# Patient Record
Sex: FEMALE | Race: WHITE | NOT HISPANIC OR LATINO | Employment: UNEMPLOYED | URBAN - METROPOLITAN AREA
[De-identification: names, ages, dates, MRNs, and addresses within clinical notes are randomized per-mention and may not be internally consistent; named-entity substitution may affect disease eponyms.]

---

## 2017-11-02 ENCOUNTER — TELEPHONE (OUTPATIENT)
Dept: NEUROLOGY | Facility: CLINIC | Age: 57
End: 2017-11-02

## 2017-11-02 NOTE — TELEPHONE ENCOUNTER
Call patients doctor office to advise them I was unable to contact patient to schedule an appointment. Office will have the patient to contact me to schedule an appointment. Patient will be scheduled with Dr. Hyde on 2nd or 4th Tuesday of the month.

## 2017-12-20 ENCOUNTER — OFFICE VISIT (OUTPATIENT)
Dept: NEUROLOGY | Facility: CLINIC | Age: 57
End: 2017-12-20
Payer: COMMERCIAL

## 2017-12-20 VITALS
DIASTOLIC BLOOD PRESSURE: 78 MMHG | WEIGHT: 135 LBS | HEIGHT: 68 IN | HEART RATE: 93 BPM | BODY MASS INDEX: 20.46 KG/M2 | SYSTOLIC BLOOD PRESSURE: 117 MMHG

## 2017-12-20 DIAGNOSIS — G61.81 CIDP (CHRONIC INFLAMMATORY DEMYELINATING POLYNEUROPATHY): Primary | ICD-10-CM

## 2017-12-20 PROCEDURE — 99205 OFFICE O/P NEW HI 60 MIN: CPT | Mod: S$GLB,,, | Performed by: PSYCHIATRY & NEUROLOGY

## 2017-12-20 PROCEDURE — 99999 PR PBB SHADOW E&M-EST. PATIENT-LVL III: CPT | Mod: PBBFAC,,, | Performed by: PSYCHIATRY & NEUROLOGY

## 2017-12-20 RX ORDER — NITROFURANTOIN MACROCRYSTALS 50 MG/1
CAPSULE ORAL
Refills: 4 | COMMUNITY
Start: 2017-12-01

## 2017-12-20 RX ORDER — PANTOPRAZOLE SODIUM 20 MG/1
20 TABLET, DELAYED RELEASE ORAL DAILY
COMMUNITY

## 2017-12-20 RX ORDER — TEMAZEPAM 22.5 MG/1
22.5 CAPSULE ORAL NIGHTLY PRN
COMMUNITY

## 2017-12-20 RX ORDER — HYDROXYCHLOROQUINE SULFATE 200 MG/1
TABLET, FILM COATED ORAL
Refills: 6 | COMMUNITY
Start: 2017-11-27

## 2017-12-20 RX ORDER — HYOSCYAMINE SULFATE 0.38 MG/1
TABLET, EXTENDED RELEASE ORAL
Refills: 6 | COMMUNITY
Start: 2017-11-26

## 2017-12-20 RX ORDER — ALPRAZOLAM 0.25 MG/1
TABLET ORAL
Refills: 3 | COMMUNITY
Start: 2017-12-13

## 2017-12-20 RX ORDER — CALCIUM CARBONATE 500(1250)
1 TABLET ORAL DAILY
COMMUNITY

## 2017-12-20 NOTE — PROGRESS NOTES
Patient Name: Taisha Nance  MRN: [id number]    CC: 2nd opinion for TM vs AIDP    HPI: Taisha Nance is a 57 y.o. female with no significant PMHx presents for second opinion of TM vs AIDP. Patient states that   GI viral illness in January 12th- diarrhea and vomiting  January 12th- hospital because she felt like she was dehydrated  January 13th- went to PCP- knees buckled and BLE weakness, could not walk. Before all this for a ~ 1 month, fingertips tingling. No other s/sxs.   January 13th- sodium levels severely low.   Patient had bilateral lower extremity paralysis abruptly with loss of control of bladder and bowel.   No respiratory issues, no ventilator. Had LP and MRIs. Records will be sent to us. Unknown what results were of CSF studies.  Patient was seen in hospital by neurologist- Dr. 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   Patient also had positive REJI and was diagnosed with lupus by rheumatologist who thinks this is lupus TM as per patient. She has since been started on cytoxan, plaquenil, steroids (weaned off), rituxan x 2 (initial doses).   Patient had IVIG and PLEX during hospital stay.   Patient had NCS in hospital- does not know results.   Patient also had CSF studies- need results- does not know (states there might have been some inflammation in results)  Denies any history of optic neuritis.     MRI Brain in January- showed changes in periaqueducatal grey, pontine tegmentum, superior cerebellar peduncles, lower midbrain tectal plate with symmetry favoring a metabolic process (osmotic demyelination vs nutritional deficiency) as per MRI report.     MRI Brain in May- Above changes have resolved.     MRI spine- extensive abnormal signal in med and lower thoracic spinal cord. Repeat MRI months later shows improvement. Full records to be provided in next visit with images.         ROS:   Review of Systems   Constitutional: Negative for malaise/fatigue. Negative for weight loss.    HENT: Negative for hearing loss.   Eyes: Negative for blurred vision and double vision.   Respiratory: Negative for shortness of breath and stridor.   Cardiovascular: Negative for chest pain and palpitations.   Gastrointestinal: Negative for nausea, vomiting and constipation.   Genitourinary: Negative for frequency. Negative for urgency.   Musculoskeletal: Negative for joint pain. Negative for myalgias and falls.   Skin: Negative for rash.   Neurological: Negative for dizziness and tremors. Negative for focal weakness and seizures.   Endo/Heme/Allergies: Does not bruise/bleed easily.   Psychiatric/Behavioral: Negative for memory loss. Negative for depression and hallucinations. The patient is not nervous/anxious.      Past Medical History  No past medical history on file.    Medications    Current Outpatient Prescriptions:     ALPRAZolam (XANAX) 0.25 MG tablet, TK 1 T PO TID PRA, Disp: , Rfl: 3    calcium carbonate (OS-SPENCER) 500 mg calcium (1,250 mg) tablet, Take 1 tablet by mouth once daily., Disp: , Rfl:     hydroxychloroquine (PLAQUENIL) 200 mg tablet, TK ONE T PO D WF OR MILK, Disp: , Rfl: 6    hyoscyamine (LEVBID) 0.375 mg Tb12, TK 2 TS PO Q 12 H PRF SPASM, Disp: , Rfl: 6    LACTOBAC NO.41/BIFIDOBACT NO.7 (PROBIOTIC-10 ORAL), Take by mouth., Disp: , Rfl:     multivitamin capsule, Take 1 capsule by mouth once daily., Disp: , Rfl:     nitrofurantoin (MACRODANTIN) 50 MG capsule, TK ONE C PO QD STARTING ONCE OTHER SCRIPT IS COMPLETE, Disp: , Rfl: 4    pantoprazole (PROTONIX) 20 MG tablet, Take 20 mg by mouth once daily., Disp: , Rfl:     temazepam (RESTORIL) 22.5 MG capsule, Take 22.5 mg by mouth nightly as needed for Insomnia., Disp: , Rfl:     alprazolam (XANAX) 1 MG tablet, Take 1 tablet (1 mg total) by mouth 2 (two) times daily. (Patient taking differently: Take 0.25 mg by mouth 2 (two) times daily. ), Disp: 60 tablet, Rfl: 1    amlodipine (NORVASC) 5 MG tablet, Take 5 mg by mouth once daily., Disp:  ", Rfl:     fluconazole (DIFLUCAN) 100 MG tablet, Take 100 mg by mouth once daily., Disp: , Rfl:     fluticasone (FLONASE) 50 mcg/actuation nasal spray, , Disp: , Rfl: 4    hyoscyamine (LEVSIN/SL) 0.125 mg Subl, , Disp: , Rfl: 0    venlafaxine (EFFEXOR-XR) 37.5 MG 24 hr capsule, , Disp: , Rfl: 6    Allergies  Review of patient's allergies indicates:   Allergen Reactions    Bactrim [sulfamethoxazole-trimethoprim]      Causes a rash       Social History  Social History     Social History    Marital status:      Spouse name: N/A    Number of children: N/A    Years of education: N/A     Occupational History    Not on file.     Social History Main Topics    Smoking status: Never Smoker    Smokeless tobacco: Not on file    Alcohol use Not on file    Drug use: Unknown    Sexual activity: Not on file     Other Topics Concern    Not on file     Social History Narrative    No narrative on file       Family History  No family history on file.    Physical Exam  /78   Pulse 93   Ht 5' 7.5" (1.715 m)   Wt 61.2 kg (135 lb)   BMI 20.83 kg/m²     General appearance: Well-developed, well-groomed in wheelchair in good spirits     Neurologic Exam: The patient is awake, alert and oriented. Language is fluent. Recent and remote memory are normal. Attention span and concentration are normal. Fund of knowledge is appropriate.     Cranial nerves: pupils are round and reactive to light and accommodation. Visual fields are full to confrontation. Ocular motility is full in all cardinal positions of gaze. Facial sensation is normal to pinprick and light touch. Corneal reflexes are present bilaterally. Facial activation is symmetric. Hearing is normal bilaterally. Palate elevates symmetrically and gag reflex is intact bilaterally. Shoulder elevation is symmetric and full strength bilaterally. Tongue is midline and neck rotation strength is normal bilaterally. Neck range of motion is normal.     Motor examination " of all extremities demonstrates normal bulk and tone in all four limbs. There are no atrophy or fasciculations. Strength is 5/5 in the upper and lower extremities bilaterally without pronator drift.     Sensory examination no sensation to vibration or pinpoint below waist. Sensory level T12 anterior, L1 posteriorly. There is def more feeling posteriorly the higher up I go to pinprick.     Deep tendon reflexes no reflexes in BLE. Brisk in BUE    Gait: plegic from waist down    Coordination: Finger to nose  in  upperextremities. Rapid alternating movements are normal in  upper ext    General exam  Cardiovascular: regular rate and rhythm with no murmurs, rubs or gallops. There are no carotid or vertebral artery bruits. Pulses in both upper and lower extremities are symmetric. There is no peripheral edema.   Head and neck: no cervical lymphadenopathy      Lab and Test Results    No results found for: WBC, HGB, HCT, PLT  No results found for: GLU, NA, K, CL, CO2, BUN, CREATININE, CALCIUM, MG, PHOS  No results found for: ALB, ALKPHOS, ALT, AST      Images: Will provide records    Independently reviewed NO    Other Tests    Assessment and Plan    Taisha Nance is a 57 y.o. female with significant disability since abnormal spinal cord lesion (resolving as per patient and repeat MRIs) and post infectious neurological issues. TM vs AIDP/GBS. Will require record with images to find out etiology. OSH neurologist convinced this was GSB while rheumatologist thinks is SLE TM. Will need to see CSF studies and images along with other labs. Patient wants 2nd opinion. She is continuing therapy. Therapies tried or is trying: cytoxan, rituxan, steroids, plaquenil. PLEX and IVIG. Has not had a repeat EMG/NCS.     Need records from outside hospital with imaging CDs  Will see patient back in specialty clinic.   Clinic in February 14th  Consider EMG/NCS next visit for prognostication?       Elyssa Hyde MD  Neurology  Resident   [facility]  [address] [place] [address]  Pager: [phone]

## 2017-12-27 NOTE — PROGRESS NOTES
Attestation:  I have personally taken the history and examined this patient and concur with the resident's note as stated above.   Assessment, and plan was made by me after evaluation of all available information .    Pamela Baer MD, JASWANT(), [facility].  Neurology-Epilepsy.

## 2018-01-19 ENCOUNTER — TELEPHONE (OUTPATIENT)
Dept: NEUROLOGY | Facility: CLINIC | Age: 58
End: 2018-01-19

## 2018-01-19 NOTE — TELEPHONE ENCOUNTER
----- Message from Wanda Chilo sent at 1/19/2018  1:08 PM CST -----  Contact: Self- [phone]  Mary Ellen- pt called to see if her medical records have been received yet- please call pt back at [phone]

## 2018-01-23 ENCOUNTER — OFFICE VISIT (OUTPATIENT)
Dept: NEUROLOGY | Facility: CLINIC | Age: 58
End: 2018-01-23
Payer: COMMERCIAL

## 2018-01-23 VITALS
SYSTOLIC BLOOD PRESSURE: 116 MMHG | WEIGHT: 135 LBS | BODY MASS INDEX: 20.46 KG/M2 | DIASTOLIC BLOOD PRESSURE: 70 MMHG | HEART RATE: 84 BPM | HEIGHT: 68 IN

## 2018-01-23 DIAGNOSIS — G37.3 TRANSVERSE MYELITIS: Primary | ICD-10-CM

## 2018-01-23 PROCEDURE — 99999 PR PBB SHADOW E&M-EST. PATIENT-LVL III: CPT | Mod: PBBFAC,,, | Performed by: PSYCHIATRY & NEUROLOGY

## 2018-01-23 PROCEDURE — 99215 OFFICE O/P EST HI 40 MIN: CPT | Mod: S$GLB,,, | Performed by: PSYCHIATRY & NEUROLOGY

## 2018-01-23 NOTE — PROGRESS NOTES
Patient Name: Taisha Nance  MRN: [id number]    CC: 2nd opinion for TM vs AIDP    Interval Hx- Patient continues to be bound to wheelchair with similar neurological status. Present with imaging CDs. Will have rituxan infusions as per rheumatologist.     HPI: Taisha Nance is a 57 y.o. female with no significant PMHx presents for second opinion of TM vs AIDP. Patient states that   GI viral illness in January 12th- diarrhea and vomiting  January 12th- hospital because she felt like she was dehydrated  January 13th- went to PCP- knees buckled and BLE weakness, could not walk. Before all this for a ~ 1 month, fingertips tingling. No other s/sxs.   January 13th- sodium levels severely low.   Patient had bilateral lower extremity paralysis abruptly with loss of control of bladder and bowel.   No respiratory issues, no ventilator. Had LP and MRIs. Records will be sent to us. Unknown what results were of CSF studies.  Patient was seen in hospital by neurologist- Dr. 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   Patient also had positive REJI and was diagnosed with lupus by rheumatologist who thinks this is lupus TM as per patient. She has since been started on cytoxan, plaquenil, steroids (weaned off), rituxan x 2 (initial doses).   Patient had IVIG and PLEX during hospital stay.   Patient had NCS in hospital- does not know results.   Patient also had CSF studies- need results- does not know (states there might have been some inflammation in results)  Denies any history of optic neuritis.     MRI Brain in January- showed changes in periaqueducatal grey, pontine tegmentum, superior cerebellar peduncles, lower midbrain tectal plate with symmetry favoring a metabolic process (osmotic demyelination vs nutritional deficiency) as per MRI report.     MRI Brain in May- Above changes have resolved.     MRI spine- extensive abnormal signal in med and lower thoracic spinal cord. Repeat MRI months later shows  improvement. Full records to be provided in next visit with images.         ROS:   Review of Systems   Constitutional: Negative for malaise/fatigue. Negative for weight loss.   HENT: Negative for hearing loss.   Eyes: Negative for blurred vision and double vision.   Respiratory: Negative for shortness of breath and stridor.   Cardiovascular: Negative for chest pain and palpitations.   Gastrointestinal: Negative for nausea, vomiting and constipation.   Genitourinary: Negative for frequency. Negative for urgency.   Musculoskeletal: Negative for joint pain. Negative for myalgias and falls.   Skin: Negative for rash.   Neurological: Negative for dizziness and tremors. Negative for focal weakness and seizures.   Endo/Heme/Allergies: Does not bruise/bleed easily.   Psychiatric/Behavioral: Negative for memory loss. Negative for depression and hallucinations. The patient is not nervous/anxious.      Past Medical History  No past medical history on file.    Medications    Current Outpatient Prescriptions:     ALPRAZolam (XANAX) 0.25 MG tablet, TK 1 T PO TID PRA, Disp: , Rfl: 3    alprazolam (XANAX) 1 MG tablet, Take 1 tablet (1 mg total) by mouth 2 (two) times daily. (Patient taking differently: Take 0.25 mg by mouth 2 (two) times daily. ), Disp: 60 tablet, Rfl: 1    amlodipine (NORVASC) 5 MG tablet, Take 5 mg by mouth once daily., Disp: , Rfl:     calcium carbonate (OS-SPENCER) 500 mg calcium (1,250 mg) tablet, Take 1 tablet by mouth once daily., Disp: , Rfl:     fluconazole (DIFLUCAN) 100 MG tablet, Take 100 mg by mouth once daily., Disp: , Rfl:     fluticasone (FLONASE) 50 mcg/actuation nasal spray, , Disp: , Rfl: 4    hydroxychloroquine (PLAQUENIL) 200 mg tablet, TK ONE T PO D WF OR MILK, Disp: , Rfl: 6    hyoscyamine (LEVBID) 0.375 mg Tb12, TK 2 TS PO Q 12 H PRF SPASM, Disp: , Rfl: 6    hyoscyamine (LEVSIN/SL) 0.125 mg Subl, , Disp: , Rfl: 0    LACTOBAC NO.41/BIFIDOBACT NO.7 (PROBIOTIC-10 ORAL), Take by mouth.,  "Disp: , Rfl:     multivitamin capsule, Take 1 capsule by mouth once daily., Disp: , Rfl:     nitrofurantoin (MACRODANTIN) 50 MG capsule, TK ONE C PO QD STARTING ONCE OTHER SCRIPT IS COMPLETE, Disp: , Rfl: 4    pantoprazole (PROTONIX) 20 MG tablet, Take 20 mg by mouth once daily., Disp: , Rfl:     temazepam (RESTORIL) 22.5 MG capsule, Take 22.5 mg by mouth nightly as needed for Insomnia., Disp: , Rfl:     venlafaxine (EFFEXOR-XR) 37.5 MG 24 hr capsule, , Disp: , Rfl: 6    Allergies  Review of patient's allergies indicates:   Allergen Reactions    Bactrim [sulfamethoxazole-trimethoprim]      Causes a rash       Social History  Social History     Social History    Marital status:      Spouse name: N/A    Number of children: N/A    Years of education: N/A     Occupational History    Not on file.     Social History Main Topics    Smoking status: Never Smoker    Smokeless tobacco: Not on file    Alcohol use Not on file    Drug use: Unknown    Sexual activity: Not on file     Other Topics Concern    Not on file     Social History Narrative    No narrative on file       Family History  No family history on file.    Physical Exam  /70   Pulse 84   Ht 5' 7.5" (1.715 m)   Wt 61.2 kg (135 lb)   BMI 20.83 kg/m²     General appearance: Well-developed, well-groomed in wheelchair in good spirits     Neurologic Exam: The patient is awake, alert and oriented. Language is fluent. Recent and remote memory are normal. Attention span and concentration are normal. Fund of knowledge is appropriate.     Cranial nerves: pupils are round and reactive to light and accommodation. Visual fields are full to confrontation. Ocular motility is full in all cardinal positions of gaze. Facial sensation is normal to pinprick and light touch. Corneal reflexes are present bilaterally. Facial activation is symmetric. Hearing is normal bilaterally. Palate elevates symmetrically and gag reflex is intact bilaterally. " Shoulder elevation is symmetric and full strength bilaterally. Tongue is midline and neck rotation strength is normal bilaterally. Neck range of motion is normal.     Motor examination of all extremities demonstrates normal bulk and tone in all four limbs. There are no atrophy or fasciculations. Strength is 5/5 in the upper and lower extremities bilaterally without pronator drift.     Sensory examination no sensation to vibration or pinpoint below waist. Sensory level T12 anterior, L1 posteriorly. There is def more feeling posteriorly the higher up I go to pinprick.     Deep tendon reflexes no reflexes in BLE. Brisk in BUE    Gait: plegic from waist down    Coordination: Finger to nose  in  upperextremities. Rapid alternating movements are normal in  upper ext    General exam  Cardiovascular: regular rate and rhythm with no murmurs, rubs or gallops. There are no carotid or vertebral artery bruits. Pulses in both upper and lower extremities are symmetric. There is no peripheral edema.   Head and neck: no cervical lymphadenopathy      Lab and Test Results    No results found for: WBC, HGB, HCT, PLT  No results found for: GLU, NA, K, CL, CO2, BUN, CREATININE, CALCIUM, MG, PHOS  No results found for: ALB, ALKPHOS, ALT, AST      Images: Will provide records    Independently reviewed NO    Other Tests    Assessment and Plan    Taisha Nance is a 57 y.o. female with significant disability since abnormal spinal cord lesion (resolving as per patient and repeat MRIs) and post infectious neurological issues. TM vs AIDP/GBS. Will require record with images to find out etiology. OSH neurologist convinced this was GSB while rheumatologist thinks is SLE TM. Will need to see CSF studies and images along with other labs. Patient wants 2nd opinion. She is continuing therapy. Therapies tried or is trying: cytoxan, rituxan, steroids, plaquenil. PLEX and IVIG. Has not had a repeat EMG/NCS but feel this will not be of high yield as  story sounding less like AIDP/CIDP when going through the records. Patient states that she had a negative NMO, but I cannot find within records. Will go through imaging with Dr. Xavier to get a better idea of autoimmune TM vs NMO vs less likely CIDP.       Will see patient back On Feb 14th with Dr. Xavier  Clinic in February 14th Feb 14th    Elyssa Hyde MD  Neurology Resident   [facility] [address] [place] [address]  Pager: [phone]

## 2018-02-05 ENCOUNTER — TELEPHONE (OUTPATIENT)
Dept: NEUROLOGY | Facility: CLINIC | Age: 58
End: 2018-02-05

## 2018-02-05 NOTE — TELEPHONE ENCOUNTER
----- Message from Gabrielle Carrera sent at 2/2/2018  8:46 AM CST -----  Contact: Gunnar (Dr. Robbins's Office) [phone]  Jason called to speak to someone regarding the patient's clinic notes. The release of information was sent 01/23/18. Please contact him to discuss further.

## 2018-02-15 ENCOUNTER — TELEPHONE (OUTPATIENT)
Dept: NEUROLOGY | Facility: CLINIC | Age: 58
End: 2018-02-15

## 2018-02-15 NOTE — TELEPHONE ENCOUNTER
----- Message from Sindy Félix sent at 2/14/2018  7:21 AM CST -----  Contact: [phone]  Pt called to speak to the nurse regarding her care and to find out about an appt she was supposed to have scheduled with the provider today. Pt stated that she was told to come to an appt today and that she would also be seen by a specialist who would also see her today. Pt would like a call back this morning asap.     Pt can be reached at  [phone].    Thanks

## 2018-02-15 NOTE — TELEPHONE ENCOUNTER
----- Message from Tano Berkowitz sent at 2/14/2018  8:08 AM CST -----  Contact: Patient @ [phone]  Patient is requesting a return call about an appt today with , pls call

## 2018-05-01 ENCOUNTER — TELEPHONE (OUTPATIENT)
Dept: NEUROLOGY | Facility: CLINIC | Age: 58
End: 2018-05-01

## 2018-05-01 NOTE — TELEPHONE ENCOUNTER
----- Message from Veda Keller sent at 4/30/2018  4:07 PM CDT -----  Test Results    Who Called:Taisha  Name of Test (Lab/Mammo/Etc):Nerve Conduction test  Date of Test:02/07/18  Ordering Provider:Dr. Bell  Where the test was performed:[facility] ([email or web address])  Communication Preference (MyChart response to Pt. (or) Call Back # and timeframe):[phone] or  [phone] or [phone]  Additional Information:asking if she needs the EMG, and asking if Dr. Xavier is going to be at her visit for a consult.

## 2018-05-24 ENCOUNTER — TELEPHONE (OUTPATIENT)
Dept: NEUROLOGY | Facility: CLINIC | Age: 58
End: 2018-05-24

## 2018-05-24 NOTE — TELEPHONE ENCOUNTER
----- Message from Hilario MARKIE Hwang sent at 5/21/2018  4:47 PM CDT -----  Needs Medical Advice    Who Called: Pt  Communication Preference (Louist response to Pt. (or) Call Back # and timeframe): [phone], [phone], or   Additional Information: Pt is calling to confirm if Dr. Xavier will be w/ Dr. Hyde for appt on 05/30. She's also asking if EMG results from [facility] have been received (pt states if it's incomplete, can she take the other half of the EMG on 05/30?)

## 2018-05-30 ENCOUNTER — OFFICE VISIT (OUTPATIENT)
Dept: NEUROLOGY | Facility: CLINIC | Age: 58
End: 2018-05-30
Payer: COMMERCIAL

## 2018-05-30 ENCOUNTER — LAB VISIT (OUTPATIENT)
Dept: LAB | Facility: HOSPITAL | Age: 58
End: 2018-05-30
Attending: PSYCHIATRY & NEUROLOGY
Payer: COMMERCIAL

## 2018-05-30 VITALS — DIASTOLIC BLOOD PRESSURE: 71 MMHG | HEIGHT: 68 IN | SYSTOLIC BLOOD PRESSURE: 113 MMHG

## 2018-05-30 DIAGNOSIS — G37.3 TRANSVERSE MYELITIS: Primary | ICD-10-CM

## 2018-05-30 DIAGNOSIS — G37.3 TRANSVERSE MYELITIS: ICD-10-CM

## 2018-05-30 PROCEDURE — 99215 OFFICE O/P EST HI 40 MIN: CPT | Mod: S$GLB,,, | Performed by: PSYCHIATRY & NEUROLOGY

## 2018-05-30 PROCEDURE — 86255 FLUORESCENT ANTIBODY SCREEN: CPT | Mod: 59

## 2018-05-30 PROCEDURE — 36415 COLL VENOUS BLD VENIPUNCTURE: CPT

## 2018-05-30 PROCEDURE — 86255 FLUORESCENT ANTIBODY SCREEN: CPT

## 2018-05-30 PROCEDURE — 99999 PR PBB SHADOW E&M-EST. PATIENT-LVL III: CPT | Mod: PBBFAC,,, | Performed by: PSYCHIATRY & NEUROLOGY

## 2018-06-04 NOTE — PROGRESS NOTES
Patient Name: Taisha Nance  MRN: [id number]    CC: 2nd opinion for TM vs AIDP    Interval Hx- Patient continues to be wheelchair bound with similar neurological status. Has had 3 rituxan infusions.     Interval Hx- Patient continues to be bound to wheelchair with similar neurological status. Present with imaging CDs. Will have rituxan infusions as per rheumatologist.     HPI: Taisha Nance is a 58 y.o. female with no significant PMHx presents for second opinion of TM vs AIDP. Patient states that   GI viral illness in January 12th- diarrhea and vomiting  January 12th- hospital because she felt like she was dehydrated  January 13th- went to PCP- knees buckled and BLE weakness, could not walk. Before all this for a ~ 1 month, fingertips tingling. No other s/sxs.   January 13th- sodium levels severely low.   Patient had bilateral lower extremity paralysis abruptly with loss of control of bladder and bowel.   No respiratory issues, no ventilator. Had LP and MRIs. Records will be sent to us. Unknown what results were of CSF studies.  Patient was seen in hospital by neurologist- Dr. 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   Patient also had positive REJI and was diagnosed with lupus by rheumatologist who thinks this is lupus TM as per patient. She has since been started on cytoxan, plaquenil, steroids (weaned off), rituxan x 2 (initial doses).   Patient had IVIG and PLEX during hospital stay.   Patient had NCS in hospital- does not know results.   Patient also had CSF studies- need results- does not know (states there might have been some inflammation in results)  Denies any history of optic neuritis.     MRI Brain in January- showed changes in periaqueducatal grey, pontine tegmentum, superior cerebellar peduncles, lower midbrain tectal plate with symmetry favoring a metabolic process (osmotic demyelination vs nutritional deficiency) as per MRI report.     MRI Brain in May- Above changes have  resolved.     MRI spine- extensive abnormal signal in med and lower thoracic spinal cord. Repeat MRI months later shows improvement. Full records to be provided in next visit with images.         ROS:   Review of Systems   Constitutional: Negative for malaise/fatigue. Negative for weight loss.   HENT: Negative for hearing loss.   Eyes: Negative for blurred vision and double vision.   Respiratory: Negative for shortness of breath and stridor.   Cardiovascular: Negative for chest pain and palpitations.   Gastrointestinal: Negative for nausea, vomiting and constipation.   Genitourinary: Negative for frequency. Negative for urgency.   Musculoskeletal: Negative for joint pain. Negative for myalgias and falls.   Skin: Negative for rash.   Neurological: Negative for dizziness and tremors. Negative for focal weakness and seizures.   Endo/Heme/Allergies: Does not bruise/bleed easily.   Psychiatric/Behavioral: Negative for memory loss. Negative for depression and hallucinations. The patient is not nervous/anxious.      Past Medical History  No past medical history on file.    Medications    Current Outpatient Prescriptions:     ALPRAZolam (XANAX) 0.25 MG tablet, TK 1 T PO TID PRA, Disp: , Rfl: 3    alprazolam (XANAX) 1 MG tablet, Take 1 tablet (1 mg total) by mouth 2 (two) times daily. (Patient taking differently: Take 0.25 mg by mouth 2 (two) times daily. ), Disp: 60 tablet, Rfl: 1    amlodipine (NORVASC) 5 MG tablet, Take 5 mg by mouth once daily., Disp: , Rfl:     calcium carbonate (OS-SPENCER) 500 mg calcium (1,250 mg) tablet, Take 1 tablet by mouth once daily., Disp: , Rfl:     fluticasone (FLONASE) 50 mcg/actuation nasal spray, , Disp: , Rfl: 4    hydroxychloroquine (PLAQUENIL) 200 mg tablet, TK ONE T PO D WF OR MILK, Disp: , Rfl: 6    hyoscyamine (LEVBID) 0.375 mg Tb12, TK 2 TS PO Q 12 H PRF SPASM, Disp: , Rfl: 6    hyoscyamine (LEVSIN/SL) 0.125 mg Subl, , Disp: , Rfl: 0    LACTOBAC NO.41/BIFIDOBACT NO.7  "(PROBIOTIC-10 ORAL), Take by mouth., Disp: , Rfl:     multivitamin capsule, Take 1 capsule by mouth once daily., Disp: , Rfl:     nitrofurantoin (MACRODANTIN) 50 MG capsule, TK ONE C PO QD STARTING ONCE OTHER SCRIPT IS COMPLETE, Disp: , Rfl: 4    fluconazole (DIFLUCAN) 100 MG tablet, Take 100 mg by mouth once daily., Disp: , Rfl:     pantoprazole (PROTONIX) 20 MG tablet, Take 20 mg by mouth once daily., Disp: , Rfl:     temazepam (RESTORIL) 22.5 MG capsule, Take 22.5 mg by mouth nightly as needed for Insomnia., Disp: , Rfl:     venlafaxine (EFFEXOR-XR) 37.5 MG 24 hr capsule, , Disp: , Rfl: 6    Allergies  Review of patient's allergies indicates:   Allergen Reactions    Bactrim [sulfamethoxazole-trimethoprim]      Causes a rash       Social History  Social History     Social History    Marital status:      Spouse name: N/A    Number of children: N/A    Years of education: N/A     Occupational History    Not on file.     Social History Main Topics    Smoking status: Never Smoker    Smokeless tobacco: Not on file    Alcohol use Not on file    Drug use: Unknown    Sexual activity: Not on file     Other Topics Concern    Not on file     Social History Narrative    No narrative on file       Family History  No family history on file.    Physical Exam  /71   Ht 5' 7.5" (1.715 m)     General appearance: Well-developed, well-groomed in wheelchair in good spirits     Neurologic Exam: The patient is awake, alert and oriented. Language is fluent. Recent and remote memory are normal. Attention span and concentration are normal. Fund of knowledge is appropriate.     Cranial nerves: pupils are round and reactive to light and accommodation. Visual fields are full to confrontation. Ocular motility is full in all cardinal positions of gaze. Facial sensation is normal to pinprick and light touch. Corneal reflexes are present bilaterally. Facial activation is symmetric. Hearing is normal bilaterally. " Palate elevates symmetrically and gag reflex is intact bilaterally. Shoulder elevation is symmetric and full strength bilaterally. Tongue is midline and neck rotation strength is normal bilaterally. Neck range of motion is normal.     Motor examination of all extremities demonstrates normal bulk and tone in all four limbs. There are no atrophy or fasciculations. Strength is 5/5 in the upper and lower extremities bilaterally without pronator drift.     Sensory examination no sensation to vibration or pinpoint below waist. Sensory level T12 anterior, L1 posteriorly. There is def more feeling posteriorly the higher up I go to pinprick.     Deep tendon reflexes no reflexes in BLE. Brisk in BUE    Gait: plegic from waist down    Coordination: Finger to nose  in  upperextremities. Rapid alternating movements are normal in  upper ext    General exam  Cardiovascular: regular rate and rhythm with no murmurs, rubs or gallops. There are no carotid or vertebral artery bruits. Pulses in both upper and lower extremities are symmetric. There is no peripheral edema.   Head and neck: no cervical lymphadenopathy      Lab and Test Results    No results found for: WBC, HGB, HCT, PLT  No results found for: GLU, NA, K, CL, CO2, BUN, CREATININE, CALCIUM, MG, PHOS  No results found for: ALB, ALKPHOS, ALT, AST      Images: MRI brain C/T/L spine uploaded    Independently reviewed YES    Other Tests    Assessment and Plan    Taisha Nance is a 58 y.o. female with significant disability since abnormal spinal cord lesion (resolving as per patient and repeat MRIs) and post infectious neurological issues. TM vs AIDP/GBS vs NMO. 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 Patient currently on Rituxan as well. Will obtain further labs (NMO and anti-MOG). NMO titers can be affects by rituxan therapy. We would like  the patient to be followed in the [facility] by Dr. Xavier's team. Patient will decide on this and let us know where she would like to follow for neurology.       Elyssa Hyde MD  Neurology Resident   [facility]  [address] [place] [address]  Pager: [phone]

## 2018-06-06 LAB
NMO/AQP4 FACS,S: NEGATIVE
NMO/AQP4 FACS,S: NEGATIVE

## 2018-06-13 ENCOUNTER — DOCUMENTATION ONLY (OUTPATIENT)
Dept: NEUROLOGY | Facility: CLINIC | Age: 58
End: 2018-06-13

## 2018-06-13 NOTE — PROGRESS NOTES
Received MRI of the T-Spine, completed on 6/4/18, from [facility]. Placed in Dr. Xavier's folder for review.

## 2018-06-30 NOTE — PROGRESS NOTES
I have personally seen and examined the patient along with the neurology resident, and agree with assessment and recommendations.    Jayashree Xavier MD

## 2019-06-26 ENCOUNTER — TELEPHONE (OUTPATIENT)
Dept: NEUROLOGY | Facility: CLINIC | Age: 59
End: 2019-06-26

## 2019-06-26 NOTE — TELEPHONE ENCOUNTER
I spoke to this patient that seen Dr. Hyde in 5/2018. She wants to know if she can see a Neurologist here to write her a referral to go to [facility] to do a specialized training to use a machine that will help her walk? I advised her that it may be better if she scheduled so see a Neurologist at [facility] to get this referral. She would like to know if any of our Neurologist can do this for her?

## 2019-06-26 NOTE — TELEPHONE ENCOUNTER
----- Message from Corrie De La Cruz sent at 6/26/2019 10:19 AM CDT -----  Contact: PT  PT is calling requesting to speak with nurse  Reason: Robotic ReWalk - needs help to train for this, needs a referral.       Callback #1: [phone] (don't leave a message on this number)  Callback #2: [phone]

## 2023-07-05 ENCOUNTER — TELEPHONE (OUTPATIENT)
Dept: REHABILITATION | Facility: HOSPITAL | Age: 63
End: 2023-07-05
Payer: COMMERCIAL

## 2023-07-05 NOTE — TELEPHONE ENCOUNTER
Followed up with Taisha regarding the voicemail she left at the driving eval office.     Taisha mentioned that a referral for a driving evaluation was faxed to us in May; chart review showed no posted referral, which would explain the lack of follow up on our end to schedule this eval.     I gave her the fax number for the referral, as well as phone numbers to schedule.     We discussed available scheduling slots; she mentioned that she will need increased time for transfers due to being a wheelchair user, and is in understanding that she might need additional session, either with us or a driving school, to become efficient in using hand controls to drive.    She mentions that she completed the in-office testing at [facility], about a month or 2 months ago.     HANY Mendez, OTR/L, CEAS-I  7/5/2023

## 2023-07-10 DIAGNOSIS — Z91.89 DRIVING SAFETY ISSUE: Primary | ICD-10-CM

## 2023-07-25 ENCOUNTER — CLINICAL SUPPORT (OUTPATIENT)
Dept: REHABILITATION | Facility: HOSPITAL | Age: 63
End: 2023-07-25
Payer: COMMERCIAL

## 2023-07-25 DIAGNOSIS — Z91.89 DRIVING SAFETY ISSUE: ICD-10-CM

## 2023-07-25 NOTE — PROGRESS NOTES
Occupational Therapy   Driving Evaluation / Hand control training    Taisha Nance   MRN: [id number]     Referring Physician: Gustavo Marley MD  Diagnosis: Driving safety issue   History: Ms. Nance is currently a licensed  but has been referred to Occupational Therapy by her MD for clinical and on-road testing to be used for driving evaluation. Ms. Nance is a patient from [facility] therapy where she began in-clinic testing for skills needed for driving. She is here today to complete clinical testing and then on-road testing to see if she is a candidate to learn to use hand-controls for driving. She suffers from transverse myelitis and has not driven for 6 years. She uses a lightweight wheelchair for mobility and a slide board for transfers.     [facility] [id number]   Exp 2/16/2024  Restrictions: None    SUBJECTIVE: Ms. Nance stated that she has improved her transfers and wants to get back to driving again.     OBJECTIVE:   Physical Function Testing:    ROM:  WFLs B UE's    Head / Neck ROM: WFL's   Pt is Right hand dominant   Strength: WFL's B UEs  Balance:    Static and Dynamic sitting- Good   Static Standing - Unable    Dynamic standing - Unable   Transfers and Mobility: Modified Independent for slightly increased time and use of slide board    Perceptual testing:    Letter cancellation:  33/34 passing score where testing was completed in 1 minute 27 seconds, average time for completion is 1 minute 15 seconds  Trail Making Test A - 24 seconds  (Average 29 seconds, Deficient > 78 seconds) (Performed at [facility])   [place] Making Test B -  49 seconds (Average 75 seconds, Deficient > 273 seconds) (Performed at [facility])   Motor Free Visual Perception Test (MVPT) which assesses figure ground, visual discrimination, spatial relationships, visual closure and visual memory,  34/36 , a passing score, where testing was competed in 4 minutes 26 seconds, average time for completion is 7 minutes    Right/Left  discrimination: 4/4     Auditory discrimination: [facility]'s     Vision testing: glasses were worn during testing  Peripheral vision: bilateral nasal vision is intact. Left peripheral vision is intact at 85°, Right is intact at 85°.  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  Correctly identified 11/12 road signs and was 3/3 for depth perception.  Comment: Visual acuity minimum standards for the [facility] is 20/40 in one eye.    Cognitive testing:   Short Blessed Test - 0  (Normal cognition) (Performed at [facility])     Judgment questions regarding rules of the road: 8/8     Insight:  Good, as she understands the purpose of testing.    Communication:   Expressive aphasia:  Not found  Receptive aphasia:  Not found      In car, on-road assessment:  Ms. Nance transferred to car Modified Independently with use of a slide board and mildly increased time and was able to adjust mirrors and seat and jessica seat belt. She was first oriented to Push- right angle hand controls and tested their use in a low traffic area  where she demonstrated understanding and functional use. She used these for stopping, starting and turning procedures in a low traffic neighborhood practicing smooth starts and stops. The same procedure was performed with Push-Rock hand controls where she safely pulled car out of parking space and drove on side streets per instruction successfully making turns and improving her smooth stops After about 90 minutes of driving in a neighborhood, she was able to follow instructions to drive back to the hospital grounds and into the parking lot where she was able to park in a parking space with instruction.   The 2 hours that were spent training in the parking lot and on the road were not enough for her to gain proficiency to drive with hand controls independently and she was instructed to schedule for additional  training.      ASSESSMENT:   Ms. Nance did well this day in clinical testing and for the initial training for the use of hand controls and is still undecided of what controls are best for her. Although she did well with their training and use today she is in need of additional training to gain proficiency and independence with their use. Ms. Nance stated understanding and expressed agreement with the following goal.      Pt will be Independent with the use of hand controls for driving and will determine which work best for her.     PLAN:   Continue Occupational Therapy services for training for the use of hand controls.       GOLDEN Babcock, MAXIMINO  Occupational Therapist, Certified  Rehabilitation Specialist  7/25/2023

## 2023-08-15 ENCOUNTER — CLINICAL SUPPORT (OUTPATIENT)
Dept: REHABILITATION | Facility: HOSPITAL | Age: 63
End: 2023-08-15
Payer: COMMERCIAL

## 2023-08-15 DIAGNOSIS — Z91.89 DRIVING SAFETY ISSUE: Primary | ICD-10-CM

## 2023-08-15 NOTE — PROGRESS NOTES
"   Occupational Therapy    Hand Control Training for Driving         Taisha Nance   MRN: [id number]       Referring Physician: Gustavo Marley MD  Diagnosis: Driving safety issue   History: Ms. Nance is currently a licensed  but has been referred to Occupational Therapy by her MD for clinical and on-road testing to be used for driving evaluation. Ms. Nance is a patient from [facility] therapy where she began in-clinic testing for skills needed for driving. She is here today to continue to learn to use hand-controls for driving. She uses a lightweight wheelchair for mobility and a slide board for transfers.     [facility] [id number]   Exp 2/16/2024  Restrictions: None     SUBJECTIVE:   " I think I've finally decided that I like the Push Right Angle controls the best. "      OBJECTIVE:      In car, on-road  training and assessment:  Pt transferred to car Modified Independently with use of a slide board and mildly increased time and was able to adjust mirrors and seat and jessica seat belt. A review of the push-right angle hand controls was performed and Pt was ready to drive our of the parking lot. She agreed to begin by driving on side streets and did well with making turns and stopping smoothly at stop signs. Turning corners was not a problem but the use of a spinner knob was discussed to make turns easier and he was happy to try a removable steering knob at 2 o'clock on the steering wheel. Once becoming familiar with using the spinner knob and 30 minutes of driving in a neighborhood, she stated that she wanted to try the Push Rock hand controls again. Once the controls were switched, she again practiced turing corners, stopping and starting from stop signs but she had more difficulty and made errors in braking. After 30 minutes of practice, we decided to we decided to switch back to Push Right Angle controls. The remaining of the session was addressed by continued practice with turning right and " left, stopping and starting smoothly and accelerating. After driving again in the neighborhood, Ms. Nance agreed to drive back to hospital grounds and was able to drive at higher speeds with increased traffic bur she was a bit more anxious and not fully comfortable.   After a total of 3 hours of driving in a neighborhood, she was able to follow instructions to drive back to the hospital grounds and into the parking lot where she was able to park in a parking space with instruction.   The 3 hours that were spent training in on the road were not enough for her to gain proficiency to drive with hand controls independently and she was instructed to schedule for additional training.      ASSESSMENT:   Ms. Nance was able to decide what type of controls work best for her and she did well this day with training for the use of hand controls and  had decided that Push Right Angle controls work best for her. Although she did well with their training and use today she is in need of additional training to gain proficiency and independence with their use. Ms. Nance stated understanding and expressed agreement with the following goal.      Pt will be Independent with the use of Push Right Angle hand controls for driving.      PLAN:   Continue Occupational Therapy services for training for the use of hand controls.          GOLDEN Babcock, MAXIMINO  Occupational Therapist, Certified  Rehabilitation Specialist  8/15/2023

## 2023-08-29 ENCOUNTER — CLINICAL SUPPORT (OUTPATIENT)
Dept: REHABILITATION | Facility: HOSPITAL | Age: 63
End: 2023-08-29
Payer: COMMERCIAL

## 2023-08-29 DIAGNOSIS — Z91.89 DRIVING SAFETY ISSUE: Primary | ICD-10-CM

## 2023-08-30 NOTE — PROGRESS NOTES
"Occupational Therapy    Hand Control Training for Driving / Discharge Summary         Taisha Nance   MRN: [id number]     Referring Physician: Gustavo Marley MD  Diagnosis: Driving safety issue   History: Ms. Nance is currently a licensed  but has been referred to Occupational Therapy by her MD for clinical and on-road testing to be used for driving evaluation. Ms. Nance is a patient from [facility] therapy where she began in-clinic testing for skills needed for driving. She is here today to continue to learn to use hand-controls for driving. She uses a lightweight wheelchair for mobility and a slide board for transfers.     [facility] [id number]   Exp 2/16/2024  Restrictions: None     SUBJECTIVE:   " I'm doing much better today than last time."      OBJECTIVE:      In car, on-road  training and assessment:  Ms. Nance transferred to car Modified Independently with use of a slide board and mildly increased time and was able to adjust mirrors and seat and jessica seat belt. A review of the push-right angle hand controls and use of a spinner knob at 2 o'clock was performed and she was ready to drive our of the parking lot. She agreed to begin by driving on side streets and did well with making turns and stopping smoothly at stop signs. Once becoming familiar with using the spinner knob and 30 minutes of driving in a neighborhood, she then drove on higher traffic streets and then on to a divided highway where she correctly obeyed traffic signs and signals, speed limits, followed at appropriate distances, changed lanes appropriately when asked and had no difficulty stopping vehicle at appropriate distances. As before, she drove at slightly lower than normal speeds but kept up with traffic flow and was understanding to stay in the middle or right lanes in order to let faster traffic pass. Ms. Nance drove on multiple divided roads and highways encountering many different traffic situations and did well in " all areas. The additional 3 hours that were spent in training this day showed that she proficient with use of Push - Right Angle hand controls for driving conditions. The next steps of obtaining an Rx from her MD and then getting the info the vendor of his choice was discussed. She expressed understanding of all.      ASSESSMENT:   Ms. Nance demonstrated the ability to operate an automobile with Push Right Angle hand controls for her Left hand and use of a removable steering knob located at 2 o'clock. She is ready to have them installed in her vehicle for use. She was given instructions to choose a vendor and understood all of the steps to having the controls installed in her vehicle.  Findings were notified to the office of Gustavo Marley MD. Changes in Pt medical status may warrant retesting in the future as the conclusion reached and the recommendations made reflect findings at the time of this evaluation.     Pt has met the following goal:      Pt will be Independent with the use of Push- right angle hand controls for driving.     PLAN:   Discharge patient from outpatient Occupational Therapy services as Pt and MD needs being met with completion and reporting of this evaluation. Vendor for hand controls will be contacted and information given for installation in Pt's vehicle.        GOLDEN Babcock CDRS  Occupational Therapist, Certified  Rehabilitation Specialist  8/29/2023

## 2024-04-29 ENCOUNTER — CLINICAL SUPPORT (OUTPATIENT)
Dept: REHABILITATION | Facility: HOSPITAL | Age: 64
End: 2024-04-29
Payer: COMMERCIAL

## 2024-04-29 DIAGNOSIS — R26.89 DECREASED FUNCTIONAL MOBILITY: ICD-10-CM

## 2024-04-29 DIAGNOSIS — M25.671 DECREASED RANGE OF MOTION OF BOTH ANKLES: Primary | ICD-10-CM

## 2024-04-29 DIAGNOSIS — G82.22 INCOMPLETE PARAPLEGIA: ICD-10-CM

## 2024-04-29 DIAGNOSIS — M25.672 DECREASED RANGE OF MOTION OF BOTH ANKLES: Primary | ICD-10-CM

## 2024-04-29 PROCEDURE — 97161 PT EVAL LOW COMPLEX 20 MIN: CPT | Mod: PO

## 2024-04-30 PROBLEM — M25.671 DECREASED RANGE OF MOTION OF BOTH ANKLES: Status: ACTIVE | Noted: 2024-04-30

## 2024-04-30 PROBLEM — M25.672 DECREASED RANGE OF MOTION OF BOTH ANKLES: Status: ACTIVE | Noted: 2024-04-30

## 2024-04-30 PROBLEM — R26.89 DECREASED FUNCTIONAL MOBILITY: Status: ACTIVE | Noted: 2024-04-30

## 2024-05-01 NOTE — PLAN OF CARE
[facility]  Physical Therapy Neurological Rehabilitation Initial Evaluation    Name: Taisha Nance  Clinic Number: [id number]    Therapy Diagnosis:   Encounter Diagnoses   Name Primary?    Incomplete paraplegia     Decreased range of motion of both ankles Yes    Decreased functional mobility      Physician: Order, Paper    Physician Orders: PT Eval and Treat  Medical Diagnosis from Referral: Incomplete paraplegia [G82.22]   Evaluation Date: 4/29/2024  Authorization Period Expiration: 4/19/2025  Plan of Care Expiration: 7/26/2024  Visit # / Visits authorized: 1/1    Progress Note Due: 5/29/24    Time In: 1300  Time Out: 1345  Total Billable Time: 45 minutes    Precautions: Standard    Subjective   Date of onset: Transverse myelitis in 2017  History of current condition - Taisha reports: The first time she tried the ReWalk was in [place] a few years ago; she responded really well to training and performed really well while using it. After she got her personal ReWalk, she was going to [facility] and noticed that her walking was a little harder and was eventually diagnosed with a tibial plateau fracture. She was provided a brace and advised to limit weight bearing for some time just before the COVID pandemic started and she was out of therapy for a while. When she was able to get back into therapy, she performed well and progressed with her mobility but was ultimately discharged due to not having the available space to use the ReWalk. For multiple reasons, she has had a hard time gettting into therapy recently but she is eager to progress to using the ReWalk. She has been going to the [facility] in [place] for Functional Electrical Stimulation and continued therapy ~1 week per month and was doing aquatic therapy to work on walking with a walker in [place] but stopped due to issues with bladder control. She also has parallel bars at home and [employer] and states that she and her parents  have been practicing walking. She also has a standing frame and Functional Electrical Stimulation bike at home that she's been using. She is planning to schedule Botox in her ankles and bladder. She states that her parents (most likely her mom) will be her  with the [employer] training.     Medical History:   No past medical history on file.    Surgical History:   Taisha Nance  has no past surgical history on file.    Medications:   Taisha has a current medication list which includes the following prescription(s): alprazolam, alprazolam, amlodipine, calcium carbonate, fluconazole, fluticasone propionate, hydroxychloroquine, hyoscyamine, hyoscyamine, lactobac no.41/bifidobact no.7, multivitamin, nitrofurantoin, pantoprazole, temazepam, and venlafaxine.    Allergies:   Review of patient's allergies indicates:   Allergen Reactions    Bactrim [sulfamethoxazole-trimethoprim]      Causes a rash        Imaging: See Imaging tab under Epic Chart review    Prior Therapy: recently discharged from [facility] in August 2023  Social History: living with her parents   Falls: denies any falls recently but had 2 near falls in her w/c   DME: Manual wheelchair and standing frame, Functional Electrical Stimulation bike, KAFO's, vibration plate, therapy plinth   Home Environment: Single story home, split level with 3 steps inside for a portion of the home but she can access the back part of the home   Exercise Routine / History: walking in hallway, standing frame, w/c aerobic classes, free weights, core exercises   Family Present at time of Eval: mom   Occupation: receives halfway/disability  Prior Level of Function: independent at w/c level  Current Level of Function: more difficulty with sit to stands working on driving    Pain:  Current 4/10, worst 6/10, best 2/10   Location:   B legs, core and low back  Description:  tingling, stinging, pressure  Aggravating Factors:  notices it when she's very mobile  Easing Factors:  "rest, stretching    Patient's goals: "To walk, improve her ability to stand from sitting with more independence, improve her balance in standing, improve her sitting reaching ability and overall limits of stability"    Objective     Mental status: Grossly intact with PT interview      Posture:   Sitting: rounded shoulders  Standing: NT    Transfers:    Transfers Level of Assistance  Comments   Roll Right Coco.    Roll Left Coco.    Roll Supine Coco.    Roll Prone NT.    Supine to Sit Coco.    Sit to Supine Coco.    Sit to Stand NT.    Stand to Sit NT.    Transfer from bed to chair Coco. Squat piv       Strength:      Right Lower Extremity Strength Grade Left Lower Extremity Strength Grade   Hip Flexion 2-/5 Hip Flexion 2/5   Hip Extension trace Hip Extension 2-/5   Hip Abduction trace Hip Abduction trace   Hip Adduction trace Hip Adduction trace   Knee Flexion 0/5 Knee Flexion 0/5   Knee Extension 0/5 Knee Extension trace   Ankle Dorsiflexion 0/5 Ankle Dorsiflexion 0/5   Ankle Plantarflexion 0/5 Ankle Plantarflexion 0/5     Flexibility:   Ankle Dorsiflexion  Right: -27 deg  Left: -18 deg      Coordination: NT today      Light touch sensation:    Right Lower Extremity: absent below lateral hip   Left Lower Extremity:  absent below lateral hip    Proprioception: NT      Modified Star: no tone provoked with BLE PROM exam      Reflexes:  NT      Balance:    Static sitting balance:Normal  Dynamic sitting balance: Good    Static standing Balance: NT; pt unable to stand independently  Dynamic standing balance:  NT; pt unable to stand independently      Ambulation: Deferred on today; pt unable to ambulate overground without assistance of KAFO's or ReWalk device    Gait Assessment:   - AD used: MWC  - Assistance: SBA  - Distance: into/out of clinic  - Speed: N/A  - Stairs: NT    Gait Analysis:  Upon observation of gait, patient demonstrates deviations including but not limited to: NT    Impairments contributing to " deviations:  Chronic transverse myelitis      Outcome Measures: Functional outcome measures deferred on today due to time constraints      CMS Impairment/Limitation/Restriction for FOTO Paraplegic Syndromes Survey    Therapist reviewed FOTO scores for Taisha Nance on 4/29/2024.   FOTO documents entered into [email or web address] - see Media section.    FOTO Score: 37%         TREATMENT     No treatment performed on evaluation date due to time constraints. Time spent gathering pertinent patient information and assessing deficits to formulate PT POC.      Home Exercises and Patient Education Provided    Education provided:   - Role of PT in improving gait, balance, endurance, and tolerance  - Establishment of PT POC and goals      Written Home Exercises Provided: To be administered within first 3 follow up sessions    Assessment   Taisha is a 64 y.o. female referred to outpatient Physical Therapy with a medical diagnosis of Incomplete paraplegia and a request to participate in robotic assisted gait training with use of her ReWalk device. Patient presents with impairments in her strength, ankle ROM, and gait ability. Functional outcome measures were deferred on today due to time constraints, however, with the subjective outcome measure, she reported her level of function as below that of other patients of similar age and physical deficits/co-morbidities. Due to her impairments listed above, she has reported having a tibial plateau fracture when participating in this training before but otherwise denies physical limitations. She is appropriate for skilled physical therapy interventions to address the above listed impairments with a goal of improving her ankle ROM and standing tolerance to maximize her tolerance to ReWalk training.     Patient prognosis is Fair.   Patient will benefit from skilled outpatient Physical Therapy to address the deficits stated above and in the chart below, provide patient/family education, and to  maximize patient's level of independence.     Plan of care discussed with patient: Yes  Patient's spiritual, cultural and educational needs considered and patient is agreeable to the plan of care and goals as stated below:     Anticipated Barriers for therapy: Pt doesn't drive, requires assistance from parents to utilize ReWalk    Medical Necessity is demonstrated by the following  History  Co-morbidities and personal factors that may impact the plan of care [] LOW: no personal factors / co-morbidities  [] MODERATE: 1-2 personal factors / co-morbidities  [x] HIGH: 3+ personal factors / co-morbidities    Moderate / High Support Documentation: No co-morbidities listed in pt's chart. The following information is derived from pt's prior PT notes with [facility]     Acute transverse myelitis (CMS/HCC)    Allergic rhinitis    Atopic dermatitis    Benign hypertension    Chronic insomnia    Elevated liver enzymes    Generalized anxiety disorder    History of fall    History of Guillain-[place] syndrome    Incomplete paraplegia (CMS/HCC)    Major depressive disorder    Multiple allergies    Nasopharyngitis    Otalgia    Pruritus ani    Right lower quadrant abdominal pain    Skin tags, anus or rectum    Systemic lupus erythematosus (CMS/HCC)    Vancomycin resistant enterococcus culture positive    Flaccid neurogenic bladder    Gastroesophageal reflux disease    Low vitamin D level    Overactive bladder    Severe episode of recurrent major depressive disorder (CMS/HCC)      Examination  Body Structures and Functions, activity limitations and participation restrictions that may impact the plan of care [] LOW: addressing 1-2 elements  [x] MODERATE: 3+ elements  [] HIGH: 4+ elements (please support below)    Moderate / High Support Documentation: balance, functional mobility, activity tolerance     Clinical Presentation [x] LOW: stable  [] MODERATE: Evolving  [] HIGH: Unstable     Decision Making/ Complexity Score: low        Goals:  Short Term Goals: 6 weeks   Pt will receive an individualized home exercise program.  Pt will improve bilateral ankle ROM by >/= 5 degrees each.  Pt will tolerate standing for >/= 10 minutes in standing frame without changes in vitals.   Pt will be seen by ReWalk representative to assess her potential to benefit from standing/training with ReWalk at this time.    Long Term Goals: 12 weeks   Pt will be independent with an individualized home exercise program.  Pt will improve bilateral ankle ROM by >/= 10 degrees each.  Pt will tolerate standing for >/= 20 minutes in standing frame or ReWalk device without changes in vitals.   Patient will improve her FOTO score from 37%  to at least 44% for improved self perception of functional mobility.(score 0-100, high score indicates greater level of function)    Plan   Plan of care Certification: 4/29/2024 to 7/26/2024.    Outpatient Physical Therapy 2 times weekly for 12 weeks to include the following interventions: Electrical Stimulation , Gait Training, Manual Therapy, Moist Heat/ Ice, Neuromuscular Re-ed, Orthotic Management and Training, Patient Education, Therapeutic Activities, and Therapeutic Exercise.     Carolina Mayes, PT

## 2024-05-17 ENCOUNTER — CLINICAL SUPPORT (OUTPATIENT)
Dept: REHABILITATION | Facility: HOSPITAL | Age: 64
End: 2024-05-17
Payer: MEDICARE

## 2024-05-17 DIAGNOSIS — M25.672 DECREASED RANGE OF MOTION OF BOTH ANKLES: Primary | ICD-10-CM

## 2024-05-17 DIAGNOSIS — M25.671 DECREASED RANGE OF MOTION OF BOTH ANKLES: Primary | ICD-10-CM

## 2024-05-17 DIAGNOSIS — R26.89 DECREASED FUNCTIONAL MOBILITY: ICD-10-CM

## 2024-05-17 PROCEDURE — 97110 THERAPEUTIC EXERCISES: CPT | Mod: PO

## 2024-05-17 PROCEDURE — 97140 MANUAL THERAPY 1/> REGIONS: CPT | Mod: PO

## 2024-05-17 PROCEDURE — 97112 NEUROMUSCULAR REEDUCATION: CPT | Mod: PO

## 2024-05-17 NOTE — PROGRESS NOTES
[facility]   Physical Therapy Treatment Note      Name: Taisha Mckoy [facility] Number: [id number]    Therapy Diagnosis:   Encounter Diagnoses   Name Primary?    Decreased range of motion of both ankles Yes    Decreased functional mobility      Physician: Order, Paper    Visit Date: 5/17/2024    Physician Orders: PT Eval and Treat  Medical Diagnosis from Referral: Incomplete paraplegia [G82.22]   Evaluation Date: 4/29/2024  Authorization Period Expiration: 4/19/2025  Plan of Care Expiration: 7/26/2024  Visit # / Visits authorized: 1/1     Progress Note Due: 5/29/24     Time In: 13:45  Time Out: 14:30  Total Billable Time: 45 minutes     Precautions: Standard    PTA Visit #: 0/5       Subjective     Patient reports: She has been using her KAFOS to walk shortly.  Using standing frame about 3 times   She was compliant with home exercise program.  Response to previous treatment: on going  Functional change: none noted    Pain: 4/10  Location: legs bilateral     Objective      Objective Measures updated at progress report unless specified.     Treatment     Taisha received the treatments listed below:      therapeutic exercises to develop strength, endurance, ROM, and flexibility for 10 minutes including:  Prone on elbows, hamstring stretch x 60 sec bilateral    2 X 2 minutes prone gastroc stretch, bilateral     manual therapy techniques: Myofacial release and Soft tissue Mobilization  for 15 minutes, including:  Soft tissue bilateral gastroc and soleus, achilles tendon      neuromuscular re-education activities to improve: Balance, Coordination, and Kinesthetic for 15 minutes. The following activities were included:    Modified side plank and elevation through pelvis x 30 sec both sides    Quadruped cat/camel x 10   Quadruped hands<>elbows x 4 each side    X 2 minutes Prone on elbows, stabilization, hip flexion stretch    therapeutic activities to improve functional performance for 5   minutes, including:  Squat pivot wheelchair to/from mat Independent   Bed mobility Independent           Patient Education and Home Exercises       Education provided:   - HEP, increased use of standing frame, stretching for increased DF     Written Home Exercises Provided: yes. Exercises were reviewed and Taisha was able to demonstrate them prior to the end of the session.  Taisha demonstrated good  understanding of the education provided. See Electronic Medical Record under Patient Instructions for exercises provided during therapy sessions    Assessment     Taisha participated well in first follow up session.  Focused on manual soft tissue and stretching of right ankle for improved range of motion in order to done ReWalk device.  Also performed developmental positions for core stabilization.  She remains appropriate for skilled Physical Therapy.     Taisha Is progressing well towards her goals.   Patient prognosis is Good.     Patient will continue to benefit from skilled outpatient physical therapy to address the deficits listed in the problem list box on initial evaluation, provide pt/family education and to maximize pt's level of independence in the home and community environment.     Patient's spiritual, cultural and educational needs considered and pt agreeable to plan of care and goals.     Anticipated barriers to physical therapy: distance to clinic    Goals: Short Term Goals: 6 weeks   Pt will receive an individualized home exercise program. On going   Pt will improve bilateral ankle ROM by >/= 5 degrees each. On going   Pt will tolerate standing for >/= 10 minutes in standing frame without changes in vitals. On going   Pt will be seen by [facility] representative to assess her potential to benefit from standing/training with ReWalk at this time. On going      Long Term Goals: 12 weeks   Pt will be independent with an individualized home exercise program. On going   Pt will improve bilateral ankle ROM by >/= 10  degrees each. On going   Pt will tolerate standing for >/= 20 minutes in standing frame or ReWalk device without changes in vitals. On going   Patient will improve her FOTO score from 37%  to at least 44% for improved self perception of functional mobility.(score 0-100, high score indicates greater level of function) on going     Plan     Cont to progress ankle range of motion    Chanell Coleman, PT

## 2024-05-20 ENCOUNTER — CLINICAL SUPPORT (OUTPATIENT)
Dept: REHABILITATION | Facility: HOSPITAL | Age: 64
End: 2024-05-20
Payer: MEDICARE

## 2024-05-20 DIAGNOSIS — R26.89 DECREASED FUNCTIONAL MOBILITY: ICD-10-CM

## 2024-05-20 DIAGNOSIS — M25.671 DECREASED RANGE OF MOTION OF BOTH ANKLES: Primary | ICD-10-CM

## 2024-05-20 DIAGNOSIS — M25.672 DECREASED RANGE OF MOTION OF BOTH ANKLES: Primary | ICD-10-CM

## 2024-05-20 PROCEDURE — 97112 NEUROMUSCULAR REEDUCATION: CPT | Mod: PO

## 2024-05-20 PROCEDURE — 97140 MANUAL THERAPY 1/> REGIONS: CPT | Mod: PO

## 2024-05-20 PROCEDURE — 97530 THERAPEUTIC ACTIVITIES: CPT | Mod: PO

## 2024-05-20 NOTE — PROGRESS NOTES
[facility]   Physical Therapy Treatment Note      Name: Taisha Mckoy [facility] Number: [id number]    Therapy Diagnosis:   Encounter Diagnoses   Name Primary?    Decreased range of motion of both ankles Yes    Decreased functional mobility        Physician: Order, Paper    Visit Date: 5/20/2024    Physician Orders: PT Eval and Treat  Medical Diagnosis from Referral: Incomplete paraplegia [G82.22]   Evaluation Date: 4/29/2024  Authorization Period Expiration: 4/19/2025  Plan of Care Expiration: 7/26/2024  Visit # / Visits authorized: 2/20     Progress Note Due: 5/29/24     Time In: 14:30  Time Out: 15:15  Total Billable Time: 45 minutes     Precautions: Standard    PTA Visit #: 0/5       Subjective     Patient reports: She has been using her KAFOS to walk shortly.  Using standing frame about 3 times   She was compliant with home exercise program.  Response to previous treatment: on going  Functional change: none noted    Pain: 4/10  Location: legs bilateral     Objective      Objective Measures updated at progress report unless specified.     Treatment     Taisha received the treatments listed below:      therapeutic exercises to develop strength, endurance, ROM, and flexibility for 0 minutes including:      manual therapy techniques: Myofacial release and Soft tissue Mobilization  for 15 minutes, including:  Soft tissue bilateral gastroc and soleus, achilles tendon  Manual stretch in ankle DF during soft tissue    Right talocural posterior mobilization     neuromuscular re-education activities to improve: Balance, Coordination, and Kinesthetic for 20 minutes. The following activities were included:    X 20 Supine ant/post pelvic tilt  X 20 Supine pelvic tilt with lower extremity marching, with manual assist for bilateral lower extremity     X 10 Seated posterior pelvic tilt, no upper extremity assist   X 10 Seated pelvic tilt with upper extremity forward reach, reengage tilt with each  forward reach     X 12 Seated upper extremity add with pilates ring at hip, 5 sec hold, both sides     therapeutic activities to improve functional performance for 10 minutes, including:  Squat pivot wheelchair to/from mat Independent   Supine to/form prone Independent     X 10 each side, Supine rolling with upper extremity initiation, min manual resistance at hip   X 10 each side, Supine rolling with lower extremity initiation, min assist at hip          Patient Education and Home Exercises       Education provided:   - HEP, increased use of standing frame, stretching for increased DF , increased time wearing AFOs    Written Home Exercises Provided: yes. Exercises were reviewed and Taisha was able to demonstrate them prior to the end of the session.  Taisha demonstrated good  understanding of the education provided. See Electronic Medical Record under Patient Instructions for exercises provided during therapy sessions    Assessment     Taisha participated well in session.  She was able to perform supine and seated posterior pelvic tilts with good activation of core muscles.  Increased difficulty maintaining abd brace with upper extremity forward reach in seated.  Discussed increased time wearing AFOs for increased duration ankle stretch, she verbalizes understanding.   She remains appropriate for skilled Physical Therapy.     Taisha Is progressing well towards her goals.   Patient prognosis is Good.     Patient will continue to benefit from skilled outpatient physical therapy to address the deficits listed in the problem list box on initial evaluation, provide pt/family education and to maximize pt's level of independence in the home and community environment.     Patient's spiritual, cultural and educational needs considered and pt agreeable to plan of care and goals.     Anticipated barriers to physical therapy: distance to clinic    Goals: Short Term Goals: 6 weeks   Pt will receive an individualized home exercise  program. On going   Pt will improve bilateral ankle ROM by >/= 5 degrees each. On going   Pt will tolerate standing for >/= 10 minutes in standing frame without changes in vitals. On going   Pt will be seen by ReWalk representative to assess her potential to benefit from standing/training with ReWalk at this time. On going      Long Term Goals: 12 weeks   Pt will be independent with an individualized home exercise program. On going   Pt will improve bilateral ankle ROM by >/= 10 degrees each. On going   Pt will tolerate standing for >/= 20 minutes in standing frame or ReWalk device without changes in vitals. On going   Patient will improve her FOTO score from 37%  to at least 44% for improved self perception of functional mobility.(score 0-100, high score indicates greater level of function) on going     Plan     Cont to progress ankle range of motion    Chanell Coleman, PT

## 2024-05-21 NOTE — PROGRESS NOTES
"  [facility]   Physical Therapy Treatment Note      Name: Taisha Mckoy [facility]  Clinic Number: [id number]    Therapy Diagnosis:   No diagnosis found.    Physician: Order, Paper    Visit Date: 5/22/2024    Physician Orders: PT Eval and Treat  Medical Diagnosis from Referral: Incomplete paraplegia [G82.22]   Evaluation Date: 4/29/2024  Authorization Period Expiration: 4/19/2025  Plan of Care Expiration: 7/26/2024  Visit # / Visits authorized: 3/20     Progress Note Due: 5/29/24     Time In: 1118  Time Out: 1200  Total Billable Time: 42 minutes     Precautions: Standard    PTA Visit #: 0/5       Subjective     Patient reports: She is feeling pretty good today. States that she won't be able to receive the botox injections in her legs until August. PT discussed potentially decreasing PT POC for now to allow her to continue working on her ROM as part of home exercise program until she can receive her botox injections and hopefully benefit more from skilled services.     She was compliant with home exercise program.  Response to previous treatment: on going  Functional change: none noted    Pain: 4/10  Location: legs bilateral     Objective      Objective Measures updated at progress report unless specified.     Treatment     Taisha received the treatments listed below:      therapeutic exercises to develop strength, endurance, ROM, and flexibility for 0 minutes including:      manual therapy techniques: Myofacial release and Soft tissue Mobilization  for 24 minutes, including:    Soft tissue mobilization to bilateral gastroc and soleus/achilles tendons    X~ 5 mins per leg, Cupping to medial/lateral proximal and distal gastroc/soleus  3 x 30" B, Prone gastroc/soleus stretching      neuromuscular re-education activities to improve: Balance, Coordination, and Kinesthetic for 14 minutes. The following activities were included:    Seated at EOM with wedge under feet and 10# weight over knees for passive " ankle stretch:  2 x 10 reps, Chest press with 2.2# (red) med ball  2 x 10 reps, Shoulder press with red med ball  2 x 10 B reps, PNF raises with red med ball      therapeutic activities to improve functional performance for 4 minutes, including:    Scoot pivot t/f wheelchair <> mat, Independent   Sit <> supine, Independent  Supine <> prone, Independent       Patient Education and Home Exercises       Education provided:   - HEP, increased use of standing frame, stretching for increased DF , increased time wearing AFOs    Written Home Exercises Provided: yes. Exercises were reviewed and Taisha was able to demonstrate them prior to the end of the session.  Taisha demonstrated good  understanding of the education provided. See Electronic Medical Record under Patient Instructions for exercises provided during therapy sessions    Assessment     Taisha participated well in today's session. She responded well to cupping and passive stretching in prone on today and appeared to show improved DF by the end of her session with her feet propped on a wedge. She showed good core control with dynamic UE activities performed while seated with minimal trunk sway noted; would benefit from progression to heavier objects. She continues to be appropriate for skilled physical therapy services to improve her ankle ROM and functional mobility.     Taisha Is progressing well towards her goals.   Patient prognosis is Good.     Patient will continue to benefit from skilled outpatient physical therapy to address the deficits listed in the problem list box on initial evaluation, provide pt/family education and to maximize pt's level of independence in the home and community environment.     Patient's spiritual, cultural and educational needs considered and pt agreeable to plan of care and goals.     Anticipated barriers to physical therapy: distance to clinic    Goals: Short Term Goals: 6 weeks   Pt will receive an individualized home exercise program.  On going   Pt will improve bilateral ankle ROM by >/= 5 degrees each. On going   Pt will tolerate standing for >/= 10 minutes in standing frame without changes in vitals. On going   Pt will be seen by ReWalk representative to assess her potential to benefit from standing/training with ReWalk at this time. On going      Long Term Goals: 12 weeks   Pt will be independent with an individualized home exercise program. On going   Pt will improve bilateral ankle ROM by >/= 10 degrees each. On going   Pt will tolerate standing for >/= 20 minutes in standing frame or ReWalk device without changes in vitals. On going   Patient will improve her FOTO score from 37%  to at least 44% for improved self perception of functional mobility.(score 0-100, high score indicates greater level of function) on going     Plan     Cont to progress ankle range of motion    Carolina Mayes, PT

## 2024-05-22 ENCOUNTER — CLINICAL SUPPORT (OUTPATIENT)
Dept: REHABILITATION | Facility: HOSPITAL | Age: 64
End: 2024-05-22
Payer: MEDICARE

## 2024-05-22 DIAGNOSIS — R26.89 DECREASED FUNCTIONAL MOBILITY: ICD-10-CM

## 2024-05-22 DIAGNOSIS — M25.672 DECREASED RANGE OF MOTION OF BOTH ANKLES: Primary | ICD-10-CM

## 2024-05-22 DIAGNOSIS — M25.671 DECREASED RANGE OF MOTION OF BOTH ANKLES: Primary | ICD-10-CM

## 2024-05-22 PROCEDURE — 97140 MANUAL THERAPY 1/> REGIONS: CPT | Mod: PO

## 2024-05-22 PROCEDURE — 97112 NEUROMUSCULAR REEDUCATION: CPT | Mod: PO

## 2024-05-27 ENCOUNTER — CLINICAL SUPPORT (OUTPATIENT)
Dept: REHABILITATION | Facility: HOSPITAL | Age: 64
End: 2024-05-27
Payer: MEDICARE

## 2024-05-27 DIAGNOSIS — M25.671 DECREASED RANGE OF MOTION OF BOTH ANKLES: Primary | ICD-10-CM

## 2024-05-27 DIAGNOSIS — R26.89 DECREASED FUNCTIONAL MOBILITY: ICD-10-CM

## 2024-05-27 DIAGNOSIS — M25.672 DECREASED RANGE OF MOTION OF BOTH ANKLES: Primary | ICD-10-CM

## 2024-05-27 PROCEDURE — 97112 NEUROMUSCULAR REEDUCATION: CPT | Mod: PO

## 2024-05-27 PROCEDURE — 97110 THERAPEUTIC EXERCISES: CPT | Mod: PO

## 2024-05-27 NOTE — PROGRESS NOTES
[facility]   Physical Therapy Treatment Note      Name: Taisha Mckoy [facility]  Clinic Number: [id number]     Therapy Diagnosis:   Encounter Diagnoses   Name Primary?    Decreased range of motion of both ankles Yes    Decreased functional mobility        Physician: Order, Paper    Visit Date: 5/27/2024    Physician Orders: PT Eval and Treat  Medical Diagnosis from Referral: Incomplete paraplegia [G82.22]   Evaluation Date: 4/29/2024  Authorization Period Expiration: 4/19/2025  Plan of Care Expiration: 7/26/2024  Visit # / Visits authorized: 4/20     Progress Note Due: 5/29/24     Time In: 1121  Time Out: 1205  Total Billable Time: 44 minutes     Precautions: Standard    PTA Visit #: 0/5       Subjective     Patient reports: Still trying to move up botox for calves to earlier - on waitlist.      She was compliant with home exercise program.  Response to previous treatment: on going  Functional change: none noted    Pain: 4/10  Location: legs bilateral     Objective      Objective Measures updated at progress report unless specified.     Treatment     Taisha received the treatments listed below:      therapeutic exercises to develop strength, endurance, ROM, and flexibility for 25 minutes including:  Passive ankle dorsiflexion stretching   -manual by PT bilaterally  -manual stretch with feet on incline board (reversed). Manual pressure provided and gentle joint oscillation.   -long duration stretch with feet on incline board (reversed) 15-20# weights at each knee x 25 min total.     neuromuscular re-education activities to improve: Balance, Coordination, and Kinesthetic for 19 minutes. The following activities were included:    Seated at EOM   2 x 10 pelvic tilts   2 x 10 lateral pelvic tilts   2 x 10 reps,  single arm punches with 4# hand weights, alternating sides  2 x 10 reps (3s holds), triceps press up (hip lift) with yoga blocks on either side - final rep 15-20s hold    therapeutic  activities to improve functional performance for 0 minutes, including:  Scoot pivot t/f wheelchair <> mat, Independent   Sit <> supine, Independent         Patient Education and Home Exercises       Education provided:   - HEP, increased use of standing frame, stretching for increased DF , increased time wearing AFOs    Written Home Exercises Provided: yes. Exercises were reviewed and Taisha was able to demonstrate them prior to the end of the session.  Taisha demonstrated good  understanding of the education provided. See Electronic Medical Record under Patient Instructions for exercises provided during therapy sessions    Assessment     Taisha did very well with stretching and core stabilization tasks today. She continues to have ankle plantarflexion contractures limiting ability to get to a neutral dorsiflexion position. After long duration stretching, improvement observed but not yet to a 90 deg neutral. Continue ankle stretching and anticipate better long term PROM with botox. She did very well with core and hip stabilization tasks which will translate to improved ReWalk control. Continue plan of care (POC).        Taisha Is progressing well towards her goals.   Patient prognosis is Good.     Patient will continue to benefit from skilled outpatient physical therapy to address the deficits listed in the problem list box on initial evaluation, provide pt/family education and to maximize pt's level of independence in the home and community environment.     Patient's spiritual, cultural and educational needs considered and pt agreeable to plan of care and goals.     Anticipated barriers to physical therapy: distance to clinic    Goals: Short Term Goals: 6 weeks   Pt will receive an individualized home exercise program. On going   Pt will improve bilateral ankle ROM by >/= 5 degrees each. On going   Pt will tolerate standing for >/= 10 minutes in standing frame without changes in vitals. On going   Pt will be seen by [facility]  representative to assess her potential to benefit from standing/training with ReWalk at this time. On going      Long Term Goals: 12 weeks   Pt will be independent with an individualized home exercise program. On going   Pt will improve bilateral ankle ROM by >/= 10 degrees each. On going   Pt will tolerate standing for >/= 20 minutes in standing frame or ReWalk device without changes in vitals. On going   Patient will improve her FOTO score from 37%  to at least 44% for improved self perception of functional mobility.(score 0-100, high score indicates greater level of function) on going     Plan     Cont to progress ankle range of motion    Corrie Saleem, PT

## 2024-05-29 ENCOUNTER — CLINICAL SUPPORT (OUTPATIENT)
Dept: REHABILITATION | Facility: HOSPITAL | Age: 64
End: 2024-05-29
Payer: MEDICARE

## 2024-05-29 DIAGNOSIS — R26.89 DECREASED FUNCTIONAL MOBILITY: ICD-10-CM

## 2024-05-29 DIAGNOSIS — M25.672 DECREASED RANGE OF MOTION OF BOTH ANKLES: Primary | ICD-10-CM

## 2024-05-29 DIAGNOSIS — M25.671 DECREASED RANGE OF MOTION OF BOTH ANKLES: Primary | ICD-10-CM

## 2024-05-29 PROCEDURE — 97530 THERAPEUTIC ACTIVITIES: CPT | Mod: PO

## 2024-05-29 PROCEDURE — 97110 THERAPEUTIC EXERCISES: CPT | Mod: PO

## 2024-05-29 NOTE — PROGRESS NOTES
[facility]   Physical Therapy Treatment Note / Re-Assessment Note     Name: Taisha Nance  Clinic Number: [id number]     Therapy Diagnosis:   Encounter Diagnoses   Name Primary?    Decreased range of motion of both ankles Yes    Decreased functional mobility        Physician: Order, Paper    Visit Date: 5/29/2024    Physician Orders: PT Eval and Treat  Medical Diagnosis from Referral: Incomplete paraplegia [G82.22]   Evaluation Date: 4/29/2024  Authorization Period Expiration: 4/19/2025  Plan of Care Expiration: 7/26/2024  Visit # / Visits authorized: 4/20  Progress Note Due: 6/29/24     Time In: 13:45  Time Out: 14:27  Total Billable Time: 42 minutes     Precautions: Standard    PTA Visit #: 0/5       Subjective     Patient reports: she was able to move up her botox injections for her legs to next week.      She was compliant with home exercise program.  Response to previous treatment: on going  Functional change: none noted    Pain: 4/10  Location: legs bilateral     Objective      Objective Measures updated at progress report unless specified.     Strength:       Right Lower Extremity Strength Grade Left Lower Extremity Strength Grade   Hip Flexion 2-/5 Hip Flexion 2/5   Hip Extension trace Hip Extension 2-/5   Hip Abduction trace Hip Abduction trace   Hip Adduction trace Hip Adduction trace   Knee Flexion 0/5 Knee Flexion 0/5   Knee Extension 0/5 Knee Extension trace   Ankle Dorsiflexion 0/5 Ankle Dorsiflexion 0/5   Ankle Plantarflexion 0/5 Ankle Plantarflexion 0/5      Flexibility:   Ankle Dorsiflexion  Right: lacking 21 deg  Left: lacking 15 deg    Standing frame tolerance: x25 minutes    CMS Impairment/Limitation/Restriction for FOTO Paraplegic Syndromes Survey     Therapist reviewed FOTO scores for Taisha Nance on 5/29/2024.   FOTO documents entered into EPIC - see Media section.     FOTO Score: 34%           Treatment     Taisha received the treatments listed below:       therapeutic exercises to develop strength, endurance, ROM, and flexibility for 17 minutes including:    Objective testing as above    While in standing frame:  - 2x10 reps serratus punches with 3# dowel   - 2x10 reps scaption with 2# dumbbells  - 2x10 reps lateral raises with 2# dumbbells   - 10x scapular elevation/depression      neuromuscular re-education activities to improve: Balance, Coordination, and Kinesthetic for 00 minutes. The following activities were included:  NP    therapeutic activities to improve functional performance for 25 minutes, including:    FOTO survey    Scoot pivot t/f wheelchair <> mat, Independent   Sit <> supine, Independent    Patient set-up in sling back standing frame x25 minutes to improve upright tolerance, provide prolonged gentle stretch to bilateral ankles, and to increase standing endurance for future ReWalk sessions. Patient working on achieving midline symmetrical weight-bearing between BLE (tendency to shift towards the left).       Patient Education and Home Exercises       Education provided:   - HEP, increased use of standing frame, stretching for increased DF , increased time wearing AFOs    Written Home Exercises Provided: yes. Exercises were reviewed and Taisha was able to demonstrate them prior to the end of the session.  Taisha demonstrated good  understanding of the education provided. See Electronic Medical Record under Patient Instructions for exercises provided during therapy sessions    Assessment     Taisha tolerated the standing frame well, demonstrating ability to achieve close to neutral ankle positioning when in a weight-bearing position vs. significant lack of DF when testing in supine. However, she has made some minimal improvements in ankle range of motion since initial evaluation, which will likely only continue to improve following botox injections scheduled for next week. If range of motion increases closer to neutral, she will be ready for ReWalk  exoskeleton training.     Taisha Is progressing well towards her goals.   Patient prognosis is Good.     Patient will continue to benefit from skilled outpatient physical therapy to address the deficits listed in the problem list box on initial evaluation, provide pt/family education and to maximize pt's level of independence in the home and community environment.     Patient's spiritual, cultural and educational needs considered and pt agreeable to plan of care and goals.     Anticipated barriers to physical therapy: distance to clinic    Goals: Short Term Goals: 6 weeks   Pt will receive an individualized home exercise program. On going   Pt will improve bilateral ankle ROM by >/= 5 degrees each. On going   Pt will tolerate standing for >/= 10 minutes in standing frame without changes in vitals. On going   Pt will be seen by ReWalk representative to assess her potential to benefit from standing/training with ReWalk at this time. On going      Long Term Goals: 12 weeks   Pt will be independent with an individualized home exercise program. On going   Pt will improve bilateral ankle ROM by >/= 10 degrees each. On going   Pt will tolerate standing for >/= 20 minutes in standing frame or ReWalk device without changes in vitals. On going   Patient will improve her FOTO score from 37%  to at least 44% for improved self perception of functional mobility.(score 0-100, high score indicates greater level of function) on going     Plan     Cont to progress ankle range of motion    Daysi Gale, PT

## 2024-06-04 ENCOUNTER — CLINICAL SUPPORT (OUTPATIENT)
Dept: REHABILITATION | Facility: HOSPITAL | Age: 64
End: 2024-06-04
Payer: MEDICARE

## 2024-06-04 DIAGNOSIS — M25.671 DECREASED RANGE OF MOTION OF BOTH ANKLES: Primary | ICD-10-CM

## 2024-06-04 DIAGNOSIS — R26.89 DECREASED FUNCTIONAL MOBILITY: ICD-10-CM

## 2024-06-04 DIAGNOSIS — M25.672 DECREASED RANGE OF MOTION OF BOTH ANKLES: Primary | ICD-10-CM

## 2024-06-04 PROCEDURE — 97110 THERAPEUTIC EXERCISES: CPT | Mod: PO

## 2024-06-04 PROCEDURE — 97530 THERAPEUTIC ACTIVITIES: CPT | Mod: PO

## 2024-06-04 NOTE — PROGRESS NOTES
[facility]   Physical Therapy Treatment Note     Name: Taisha Mckoy [facility]  Clinic Number: [id number]     Therapy Diagnosis:   Encounter Diagnoses   Name Primary?    Decreased range of motion of both ankles Yes    Decreased functional mobility        Physician: Order, Paper    Visit Date: 6/4/2024    Physician Orders: PT Eval and Treat  Medical Diagnosis from Referral: Incomplete paraplegia [G82.22]   Evaluation Date: 4/29/2024  Authorization Period Expiration: 4/19/2025  Plan of Care Expiration: 7/26/2024  Visit # / Visits authorized: 5/20  Progress Note Due: 6/29/24     Time In: 12:55  Time Out: 13:40  Total Billable Time: 45 minutes     Precautions: Standard    PTA Visit #: 0/5       Subjective     Patient reports: she is doing well this morning. She has her botox injections for bladder tomorrow and then for legs the next day. She won't be coming to PT on Friday.     She was compliant with home exercise program.  Response to previous treatment: good  Functional change: stable    Pain: 4/10  Location: legs bilateral    Objective      Objective Measures updated at progress report unless specified.     Treatment     Taisha received the treatments listed below:      therapeutic exercises to develop strength, endurance, ROM, and flexibility for 20 minutes including:    While in standing frame:  - 2x10 reps serratus punches with 5# dowel   - 2x10 scapular elevation/depression with 5# dowel    Passive ankle dorsiflexion stretching   -manual by PT bilaterally while pt supine on mat + supine hamstring stretch  -manual stretch with feet on incline board. Manual pressure provided and gentle joint oscillation.   -long duration stretch with feet on incline board with 15# weights on legs for increased overpressure       neuromuscular re-education activities to improve: Balance, Coordination, and Kinesthetic for 00 minutes. The following activities were included:  NP    therapeutic activities to improve  functional performance for 25 minutes, including:    Scoot pivot t/f wheelchair <> mat, Independent   Sit <> supine, Independent    Patient set-up in sling back standing frame and stood for 2x10 minutes to improve upright tolerance, provide prolonged gentle stretch to bilateral ankles, and to increase standing endurance for future ReWalk sessions. Patient working on achieving midline symmetrical weight-bearing between BLE (tendency to shift towards the left).     Seated boosts with paralettes: patient sat EOM with each hand placed on paralettes (on either side of pt). PT cueing to keep elbows extended and to utilize scapular depression to perform boosting and lifting of hips off mat. Patient performed 2x10 reps.       Patient Education and Home Exercises       Education provided:   - HEP, increased use of standing frame, stretching for increased DF , increased time wearing AFOs    Written Home Exercises Provided: Patient instructed to cont prior HEP. Exercises were reviewed and Taisha was able to demonstrate them prior to the end of the session.  Taisha demonstrated good  understanding of the education provided. See Electronic Medical Record under Patient Instructions for exercises provided during therapy sessions    Assessment     Taisha is making slow but steady gains with ankle range of motion, demonstrating better foot contact in the standing frame. However, she still has notable ankle inversion on the right side and, when resting in standing, a tendency towards right-side windswept hips. She will hopefully have further improvements in ankle range of motion and positioning following planned botox injections this week which will assist in her ability to participate in ReWalk exoskeleton training.     Taisha Is progressing well towards her goals.   Patient prognosis is Good.     Patient will continue to benefit from skilled outpatient physical therapy to address the deficits listed in the problem list box on initial  evaluation, provide pt/family education and to maximize pt's level of independence in the home and community environment.     Patient's spiritual, cultural and educational needs considered and pt agreeable to plan of care and goals.     Anticipated barriers to physical therapy: distance to clinic    Goals: Short Term Goals: 6 weeks   Pt will receive an individualized home exercise program. On going   Pt will improve bilateral ankle ROM by >/= 5 degrees each. On going   Pt will tolerate standing for >/= 10 minutes in standing frame without changes in vitals. On going   Pt will be seen by ReWalk representative to assess her potential to benefit from standing/training with ReWalk at this time. On going      Long Term Goals: 12 weeks   Pt will be independent with an individualized home exercise program. On going   Pt will improve bilateral ankle ROM by >/= 10 degrees each. On going   Pt will tolerate standing for >/= 20 minutes in standing frame or ReWalk device without changes in vitals. On going   Patient will improve her FOTO score from 37%  to at least 44% for improved self perception of functional mobility.(score 0-100, high score indicates greater level of function) on going     Plan     Cont to progress ankle range of motion and standing frame tolerance    Daysi Gale, PT

## 2024-06-10 NOTE — PROGRESS NOTES
[facility]   Physical Therapy Treatment Note     Name: Taisha Mckoy [facility] Number: [id number]     Therapy Diagnosis:   Encounter Diagnoses   Name Primary?    Decreased range of motion of both ankles Yes    Decreased functional mobility          Physician: Order, Paper    Visit Date: 6/11/2024    Physician Orders: PT Eval and Treat  Medical Diagnosis from Referral: Incomplete paraplegia [G82.22]   Evaluation Date: 4/29/2024  Authorization Period Expiration: 4/19/2025  Plan of Care Expiration: 7/26/2024  Visit # / Visits authorized: 5/20  Progress Note Due: 6/29/24     Time In: 13:46  Time Out: 14:30  Total Billable Time: 44 minutes     Precautions: Standard    PTA Visit #: 0/5       Subjective     Patient reports: her botox injections went well. She brought her personal KAFOs for today's session.     She was compliant with home exercise program.  Response to previous treatment: good  Functional change: stable    Pain: 4/10  Location: legs bilateral    Objective      Objective Measures updated at progress report unless specified.     Flexibility:   Ankle Dorsiflexion (with overpressure)   Right: lacking 18 deg  Left: lacking 8 deg    Treatment     Taisha received the treatments listed below:      therapeutic exercises to develop strength, endurance, ROM, and flexibility for 12 minutes including:    Passive ankle dorsiflexion stretching   - manual by PT bilaterally while pt supine on mat + supine hamstring stretch  - manual stretch with feet on incline board. Manual pressure provided and gentle joint oscillation.   - long duration stretch with feet on incline board with 15# weights on legs for increased overpressure       neuromuscular re-education activities to improve: Balance, Coordination, and Kinesthetic for 25 minutes. The following activities were included:    In // bars: BUE support and KAFOs donned; PT SBA-min A for stability   - lateral weight shifts L/R (progressing from small  to big)  - lateral weight shifts + hip hike on each leg  - anteroposterior weight shifts fwd/bwd    - taking 6 steps fwd/bwd  - standing + lifting 1 UE off the bar       therapeutic activities to improve functional performance for 5 minutes, including:    Scoot pivot t/f wheelchair <> mat, Independent   Sit <> supine, Independent    Pt donning/doffing bilateral KAFOs while in long sit on gym mat.      Patient Education and Home Exercises       Education provided:   - HEP, increased use of standing frame, stretching for increased DF , increased time wearing AFOs    Written Home Exercises Provided: Patient instructed to cont prior HEP. Exercises were reviewed and Taisha was able to demonstrate them prior to the end of the session.  Taisha demonstrated good  understanding of the education provided. See Electronic Medical Record under Patient Instructions for exercises provided during therapy sessions    Assessment     Taisha demonstrates small but notable gains in ankle range of motion following her botox injections last week. Though not yet appropriate for exoskeleton training, she was able to tolerate standing in the // bars in her bilateral KAFOs which indicates good progressions towards ReWalk training. She has a tendency to utilize more trunk and pelvic rotation with weight shifts and was edu that this will need to be limited once in exoskeleton. She would benefit from more standing balance work and standing tolerance in general to maximize preparation for ReWalk training.      Taisha Is progressing well towards her goals.   Patient prognosis is Good.     Patient will continue to benefit from skilled outpatient physical therapy to address the deficits listed in the problem list box on initial evaluation, provide pt/family education and to maximize pt's level of independence in the home and community environment.     Patient's spiritual, cultural and educational needs considered and pt agreeable to plan of care and  goals.     Anticipated barriers to physical therapy: distance to clinic    Goals: Short Term Goals: 6 weeks   Pt will receive an individualized home exercise program. On going   Pt will improve bilateral ankle ROM by >/= 5 degrees each. On going   Pt will tolerate standing for >/= 10 minutes in standing frame without changes in vitals. On going   Pt will be seen by ReWalk representative to assess her potential to benefit from standing/training with ReWalk at this time. On going      Long Term Goals: 12 weeks   Pt will be independent with an individualized home exercise program. On going   Pt will improve bilateral ankle ROM by >/= 10 degrees each. On going   Pt will tolerate standing for >/= 20 minutes in standing frame or ReWalk device without changes in vitals. On going   Patient will improve her FOTO score from 37%  to at least 44% for improved self perception of functional mobility.(score 0-100, high score indicates greater level of function) on going     Plan     Cont to progress ankle range of motion and standing frame tolerance    Daysi Gale, PT

## 2024-06-11 ENCOUNTER — CLINICAL SUPPORT (OUTPATIENT)
Dept: REHABILITATION | Facility: HOSPITAL | Age: 64
End: 2024-06-11
Payer: MEDICARE

## 2024-06-11 DIAGNOSIS — M25.671 DECREASED RANGE OF MOTION OF BOTH ANKLES: Primary | ICD-10-CM

## 2024-06-11 DIAGNOSIS — M25.672 DECREASED RANGE OF MOTION OF BOTH ANKLES: Primary | ICD-10-CM

## 2024-06-11 DIAGNOSIS — R26.89 DECREASED FUNCTIONAL MOBILITY: ICD-10-CM

## 2024-06-11 PROCEDURE — 97112 NEUROMUSCULAR REEDUCATION: CPT | Mod: PO

## 2024-06-11 PROCEDURE — 97110 THERAPEUTIC EXERCISES: CPT | Mod: PO

## 2024-06-13 ENCOUNTER — CLINICAL SUPPORT (OUTPATIENT)
Dept: REHABILITATION | Facility: HOSPITAL | Age: 64
End: 2024-06-13
Payer: MEDICARE

## 2024-06-13 DIAGNOSIS — R26.89 DECREASED FUNCTIONAL MOBILITY: ICD-10-CM

## 2024-06-13 DIAGNOSIS — M25.671 DECREASED RANGE OF MOTION OF BOTH ANKLES: Primary | ICD-10-CM

## 2024-06-13 DIAGNOSIS — M25.672 DECREASED RANGE OF MOTION OF BOTH ANKLES: Primary | ICD-10-CM

## 2024-06-13 PROCEDURE — 97530 THERAPEUTIC ACTIVITIES: CPT | Mod: PO

## 2024-06-13 PROCEDURE — 97110 THERAPEUTIC EXERCISES: CPT | Mod: PO

## 2024-06-13 NOTE — PROGRESS NOTES
[facility]   Physical Therapy Treatment Note     Name: Taisha Mckoy [facility] Number: [id number]     Therapy Diagnosis:   Encounter Diagnoses   Name Primary?    Decreased range of motion of both ankles Yes    Decreased functional mobility          Physician: Order, Paper    Visit Date: 6/13/2024    Physician Orders: PT Eval and Treat  Medical Diagnosis from Referral: Incomplete paraplegia [G82.22]   Evaluation Date: 4/29/2024  Authorization Period Expiration: 4/19/2025  Plan of Care Expiration: 7/26/2024  Visit # / Visits authorized: 8/20  Progress Note Due: 6/29/24     Time In: 13:46  Time Out: 14:30  Total Billable Time: 44 minutes     Precautions: Standard    PTA Visit #: 0/5       Subjective     Patient reports: she had to use the restroom prior to therapy and her legs usually get tight as a signal so that might be why her ankle measurements are a little worse today.     She was compliant with home exercise program.  Response to previous treatment: good  Functional change: stable    Pain: 0/10  Location: legs bilateral    Objective      Objective Measures updated at progress report unless specified.     Flexibility:   Ankle Dorsiflexion (with overpressure following standing frame activity today)   Right: lacking 22 deg  Left: lacking 12 deg    Treatment     Taisha received the treatments listed below:      therapeutic exercises to develop strength, endurance, ROM, and flexibility for 19 minutes including:    Passive ankle dorsiflexion stretching   - manual by PT bilaterally while pt supine on mat   - manual stretch with feet on incline board. Manual pressure provided and gentle joint oscillation.   - long duration stretch x5 min with feet on incline board with 15# weights on legs for increased overpressure       neuromuscular re-education activities to improve: Balance, Coordination, and Kinesthetic for 00 minutes. The following activities were included:    Not performed today:   In  // bars: BUE support and KAFOs donned; PT SBA-min A for stability   - lateral weight shifts L/R (progressing from small to big)  - lateral weight shifts + hip hike on each leg  - anteroposterior weight shifts fwd/bwd    - taking 6 steps fwd/bwd  - standing + lifting 1 UE off the bar       therapeutic activities to improve functional performance for 25 minutes, including:    Scoot pivot t/f wheelchair <> mat, Independent   Sit <> supine, Independent    Pt set-up in sling-back standing frame for 2x10 min bouts (seated rest in between):   1st set pt focused on obtaining midline and as much postural extension as possible.  2nd set pt performed the following UE activities:   --> 2 x 10 reps 5# dowel serratus punches  --> 2 x 10 reps 5# dowel shoulder raises     Time includes set-up, removing shoes between sets for improved contact with floor and better view of ankle positioning.     Patient Education and Home Exercises       Education provided:   - HEP, increased use of standing frame, stretching for increased DF , increased time wearing AFOs    Written Home Exercises Provided: Patient instructed to cont prior HEP. Exercises were reviewed and Taisha was able to demonstrate them prior to the end of the session.  Taisha demonstrated good  understanding of the education provided. See Electronic Medical Record under Patient Instructions for exercises provided during therapy sessions    Assessment     Taisha tolerated the standing frame well today, though shoes removed during this session for better idea of ankle positioning when in standing position. She continues to display a significant inversion at the right ankle when in a weight-bearing position which is a concern for ReWalk training as this increases pressure at the 5th met head and may lead to pressure wounds. However, her left ankle is making steady progress to neutral position with no inversion noted. Edu re: stretching, ankle positioning, and contraindications to ReWalk  training provided today. Will continue to monitor ankle range of motion at this time as pt recently received botox injections in both lower legs.     Taisha Is progressing well towards her goals.   Patient prognosis is Good.     Patient will continue to benefit from skilled outpatient physical therapy to address the deficits listed in the problem list box on initial evaluation, provide pt/family education and to maximize pt's level of independence in the home and community environment.     Patient's spiritual, cultural and educational needs considered and pt agreeable to plan of care and goals.     Anticipated barriers to physical therapy: distance to clinic    Goals: Short Term Goals: 6 weeks   Pt will receive an individualized home exercise program. On going   Pt will improve bilateral ankle ROM by >/= 5 degrees each. On going   Pt will tolerate standing for >/= 10 minutes in standing frame without changes in vitals. On going   Pt will be seen by ReWalk representative to assess her potential to benefit from standing/training with ReWalk at this time. On going      Long Term Goals: 12 weeks   Pt will be independent with an individualized home exercise program. On going   Pt will improve bilateral ankle ROM by >/= 10 degrees each. On going   Pt will tolerate standing for >/= 20 minutes in standing frame or ReWalk device without changes in vitals. On going   Patient will improve her FOTO score from 37%  to at least 44% for improved self perception of functional mobility.(score 0-100, high score indicates greater level of function) on going     Plan     Cont to progress ankle range of motion and standing frame tolerance    Daysi Gale, PT

## 2024-06-17 ENCOUNTER — CLINICAL SUPPORT (OUTPATIENT)
Dept: REHABILITATION | Facility: HOSPITAL | Age: 64
End: 2024-06-17
Payer: MEDICARE

## 2024-06-17 DIAGNOSIS — M25.672 DECREASED RANGE OF MOTION OF BOTH ANKLES: Primary | ICD-10-CM

## 2024-06-17 DIAGNOSIS — M25.671 DECREASED RANGE OF MOTION OF BOTH ANKLES: Primary | ICD-10-CM

## 2024-06-17 DIAGNOSIS — R26.89 DECREASED FUNCTIONAL MOBILITY: ICD-10-CM

## 2024-06-17 PROCEDURE — 97112 NEUROMUSCULAR REEDUCATION: CPT | Mod: PO

## 2024-06-17 PROCEDURE — 97110 THERAPEUTIC EXERCISES: CPT | Mod: PO

## 2024-06-17 NOTE — PROGRESS NOTES
[facility]   Physical Therapy Treatment Note     Name: Taisha Mckoy [facility] Number: [id number]     Therapy Diagnosis:   Encounter Diagnoses   Name Primary?    Decreased range of motion of both ankles Yes    Decreased functional mobility        Physician: Order, Paper    Visit Date: 6/17/2024    Physician Orders: PT Eval and Treat  Medical Diagnosis from Referral: Incomplete paraplegia [G82.22]   Evaluation Date: 4/29/2024  Authorization Period Expiration: 12/31/2024  Plan of Care Expiration: 7/26/2024  Visit # / Visits authorized: 9/20  Progress Note Due: 6/29/24     Time In: 11:16  Time Out: 11:59  Total Billable Time: 43 minutes     Precautions: Standard    PTA Visit #: 0/5       Subjective     Patient reports: she wasn't sure if she'd make it because of the rain. She also has been working on her stretching and standing more.     She was compliant with home exercise program.  Response to previous treatment: good  Functional change: stable    Pain: 0/10  Location: legs bilateral    Objective      Objective Measures updated at progress report unless specified.     Flexibility:   Ankle Dorsiflexion (with overpressure)  Right: lacking 20 deg  Left: lacking 8 deg    Treatment     Taisha received the treatments listed below:      therapeutic exercises to develop strength, endurance, ROM, and flexibility for 25 minutes including:    ROM measurements of bilateral ankles     Passive ankle dorsiflexion stretching   - manual by PT bilaterally while pt supine on mat   - manual stretch with feet on incline board. Manual pressure provided and gentle joint oscillation.   - long duration stretch x5 min with feet on incline board with 15# weights on legs for increased overpressure  - long duration stretch x3 min feet on incline board + pt anterior trunk lean on wedge to bring COG fwd over heels       Demo passive ankle stretch for future caregiver assist with mom filming     neuromuscular  re-education activities to improve: Balance, Coordination, and Kinesthetic for 15 minutes. The following activities were included:    Seated EOM: PT min A at times to fully complete   - 3 x 5 reps trunk uprighting from wedge  - 2 x 10 reps partial-range sit-ups   - 2 x 10 reps boosting with paralettes     therapeutic activities to improve functional performance for 3 minutes, including:    Scoot pivot t/f wheelchair <> mat, Independent   Sit <> supine, Independent    Time includes set-up, removing shoes between sets for improved contact with floor and better view of ankle positioning.     Patient Education and Home Exercises       Education provided:   - HEP, increased use of standing frame, stretching for increased DF , increased time wearing AFOs  - edu re: contraindications based on ankle measurements for ReWalk training    Written Home Exercises Provided: Patient instructed to cont prior HEP. Exercises were reviewed and Taisha was able to demonstrate them prior to the end of the session.  Taisha demonstrated good  understanding of the education provided. See Electronic Medical Record under Patient Instructions for exercises provided during therapy sessions    Assessment     Taisha continues to participate well in therapy sessions with a focus on improving ankle range of motion. Her mother was also present and appropriately demo'd the passive stretch to her ankle to improve DF. However, Taisha continues to have very limiting inversion on her right side that has had minimal improvements at this time. She does appear to have a good stretching system in place at home and also works appropriately on core/trunk control as needed. PT suggests that she get in touch with ReWalk rep on progress and to further discuss plan of care.     Taisha Is progressing well towards her goals.   Patient prognosis is Good.     Patient will continue to benefit from skilled outpatient physical therapy to address the deficits listed in the problem  list box on initial evaluation, provide pt/family education and to maximize pt's level of independence in the home and community environment.     Patient's spiritual, cultural and educational needs considered and pt agreeable to plan of care and goals.     Anticipated barriers to physical therapy: distance to clinic    Goals: Short Term Goals: 6 weeks   Pt will receive an individualized home exercise program. On going   Pt will improve bilateral ankle ROM by >/= 5 degrees each. On going   Pt will tolerate standing for >/= 10 minutes in standing frame without changes in vitals. On going   Pt will be seen by ReWalk representative to assess her potential to benefit from standing/training with ReWalk at this time. On going      Long Term Goals: 12 weeks   Pt will be independent with an individualized home exercise program. On going   Pt will improve bilateral ankle ROM by >/= 10 degrees each. On going   Pt will tolerate standing for >/= 20 minutes in standing frame or ReWalk device without changes in vitals. On going   Patient will improve her FOTO score from 37%  to at least 44% for improved self perception of functional mobility.(score 0-100, high score indicates greater level of function) on going     Plan     Cont to progress ankle range of motion and standing frame tolerance    Daysi Gale, PT

## 2024-06-21 ENCOUNTER — CLINICAL SUPPORT (OUTPATIENT)
Dept: REHABILITATION | Facility: HOSPITAL | Age: 64
End: 2024-06-21
Payer: MEDICARE

## 2024-06-21 DIAGNOSIS — R26.89 DECREASED FUNCTIONAL MOBILITY: ICD-10-CM

## 2024-06-21 DIAGNOSIS — M25.671 DECREASED RANGE OF MOTION OF BOTH ANKLES: Primary | ICD-10-CM

## 2024-06-21 DIAGNOSIS — M25.672 DECREASED RANGE OF MOTION OF BOTH ANKLES: Primary | ICD-10-CM

## 2024-06-21 PROCEDURE — 97140 MANUAL THERAPY 1/> REGIONS: CPT | Mod: KX,PO

## 2024-06-21 PROCEDURE — 97530 THERAPEUTIC ACTIVITIES: CPT | Mod: KX,PO

## 2024-06-21 NOTE — PROGRESS NOTES
[facility]   Physical Therapy Treatment Note     Name: Taisha Mckoy [facility] Number: [id number]     Therapy Diagnosis:   Encounter Diagnoses   Name Primary?    Decreased range of motion of both ankles Yes    Decreased functional mobility      Physician: Order, Paper    Visit Date: 6/21/2024    Physician Orders: PT Eval and Treat  Medical Diagnosis from Referral: Incomplete paraplegia [G82.22]   Evaluation Date: 4/29/2024  Authorization Period Expiration: 12/31/2024  Plan of Care Expiration: 7/26/2024  Visit # / Visits authorized: 9/20  Progress Note Due: 6/29/24     Time In: 1116  Time Out: 1209  Total Billable Time: 53 minutes     Precautions: Standard    PTA Visit #: 0/5     Subjective     Patient reports: She is feeling pretty good this morning. Denies any complaints at the start of her session.     She was compliant with home exercise program.  Response to previous treatment: good  Functional change: stable    Pain: 0/10  Location: legs bilateral    Objective      Objective Measures updated at progress report unless specified.     Flexibility:   Ankle Dorsiflexion (with overpressure)  Right: lacking 19 deg  Left: not measured today    Treatment     Taisha received the treatments listed below:      therapeutic exercises to develop strength, endurance, ROM, and flexibility for 00 minutes including:    NP      neuromuscular re-education activities to improve: Balance, Coordination, and Kinesthetic for 00 minutes. The following activities were included:    NP      therapeutic activities to improve functional performance for 23 minutes, including:    Scoot pivot t/f wheelchair <> mat, Independent     Time utilized for discussion on dry needling and the benefits/contraindications and review/signing of informed consent.    Time also utilized for discussion of normal vs abnormal responses to dry needling and signs to monitor that may indicate need for further medical care.    Sit <> supine  <> prone, Independent    Time includes set-up, removing shoes to improve ease of bed mobility, .       manual therapy techniques: Soft tissue Mobilization were applied to the: right and left gastrocnemius, soleus, and tibialis posterior for 24 minutes, including:    Time above includes time for proper positioning and palpation of B LE musculature to identify trigger points and areas of increased tightness in the muscles underlined above prior to needle application. Also includes time for gentle soft tissue mobilization, assessment of tissue tone and gentle ROM of B ankle joints.       Trigger point dry needling techniques were applied to the: right and left gastrocnemius, and right tibialis posterior for 6 minutes, includin, 40 mm x 0.30 mm needle was applied to each of the following locations and held in stationary position for ~20-40 seconds to allow for twitch response/relaxation of muscle:  - Medial muscle belly of right gastroc  - Lateral muscle belly of right gastroc -- light bleeding noted which subsided after ~45-60 seconds of deep pressure  - Medial muscle belly of left gastroc  - Lateral muscle belly of left gastroc  - Right tibialis posterior      Patient Education and Home Exercises       Education provided:   - HEP, increased use of standing frame, stretching for increased DF , increased time wearing AFOs  - edu re: contraindications based on ankle measurements for ReWalk training    Written Home Exercises Provided: Patient instructed to cont prior HEP. Exercises were reviewed and Taisha was able to demonstrate them prior to the end of the session.  Taisha demonstrated good  understanding of the education provided. See Electronic Medical Record under Patient Instructions for exercises provided during therapy sessions    Assessment     Taisha tolerated today's session very well. PT performed dry needling to B gastroc and right posterior tibialis to improve her DF ROM. She showed improved muscle tone in  areas where needling was applied and when measured, her right ankle DF range only improved by 1 degree compared to her last visit. This was without any extensive stretching due to limited time in pt's session however PT advised pt to follow up today's session by performing her home exercise program to maximize her potential for improved motion due to her more relaxed tone. She continues to be appropriate for skilled physical therapy services per her POC at this time.     Taisha Is progressing well towards her goals.   Patient prognosis is Good.     Patient will continue to benefit from skilled outpatient physical therapy to address the deficits listed in the problem list box on initial evaluation, provide pt/family education and to maximize pt's level of independence in the home and community environment.     Patient's spiritual, cultural and educational needs considered and pt agreeable to plan of care and goals.     Anticipated barriers to physical therapy: distance to clinic    Goals: Short Term Goals: 6 weeks   Pt will receive an individualized home exercise program. On going   Pt will improve bilateral ankle ROM by >/= 5 degrees each. On going   Pt will tolerate standing for >/= 10 minutes in standing frame without changes in vitals. On going   Pt will be seen by ReWalk representative to assess her potential to benefit from standing/training with ReWalk at this time. On going      Long Term Goals: 12 weeks   Pt will be independent with an individualized home exercise program. On going   Pt will improve bilateral ankle ROM by >/= 10 degrees each. On going   Pt will tolerate standing for >/= 20 minutes in standing frame or ReWalk device without changes in vitals. On going   Patient will improve her FOTO score from 37%  to at least 44% for improved self perception of functional mobility.(score 0-100, high score indicates greater level of function) on going     Plan     Cont to progress ankle range of motion and  standing frame tolerance    Carolina Mayes, PT

## 2024-06-24 ENCOUNTER — CLINICAL SUPPORT (OUTPATIENT)
Dept: REHABILITATION | Facility: HOSPITAL | Age: 64
End: 2024-06-24
Payer: MEDICARE

## 2024-06-24 DIAGNOSIS — M25.672 DECREASED RANGE OF MOTION OF BOTH ANKLES: Primary | ICD-10-CM

## 2024-06-24 DIAGNOSIS — M25.671 DECREASED RANGE OF MOTION OF BOTH ANKLES: Primary | ICD-10-CM

## 2024-06-24 DIAGNOSIS — R26.89 DECREASED FUNCTIONAL MOBILITY: ICD-10-CM

## 2024-06-24 PROCEDURE — 97112 NEUROMUSCULAR REEDUCATION: CPT | Mod: PO,CQ

## 2024-06-24 PROCEDURE — 97110 THERAPEUTIC EXERCISES: CPT | Mod: PO,CQ

## 2024-06-24 PROCEDURE — 97530 THERAPEUTIC ACTIVITIES: CPT | Mod: PO,CQ

## 2024-06-24 NOTE — PROGRESS NOTES
[facility]   Physical Therapy Treatment Note     Name: Taisha Mckoy [facility] Number: [id number]     Therapy Diagnosis:   Encounter Diagnoses   Name Primary?    Decreased range of motion of both ankles Yes    Decreased functional mobility          Physician: Order, Paper    Visit Date: 6/24/2024    Physician Orders: PT Eval and Treat  Medical Diagnosis from Referral: Incomplete paraplegia [G82.22]   Evaluation Date: 4/29/2024  Authorization Period Expiration: 12/31/2024  Plan of Care Expiration: 7/26/2024  Visit # / Visits authorized: 9/20  Progress Note Due: 6/29/24     Time In: 1345  Time Out: 1430  Total Billable Time: 45 minutes     Precautions: Standard    PTA Visit #: 1/5     Subjective     Patient reports: She is feeling pretty good this afternoon. Denies any complaints at the start of her session.     She was compliant with home exercise program.  Response to previous treatment: good  Functional change: stable    Pain: 0/10  Location: legs bilateral    Objective      Objective Measures updated at progress report unless specified.     Flexibility:   Ankle Dorsiflexion (with overpressure)  Right: lacking 19 deg  Left: not measured today    Standing /70  HR 90     Treatment     Taisha received the treatments listed below:      therapeutic exercises to develop strength, endurance, ROM, and flexibility for 15 minutes including:    Passive ankle dorsiflexion stretching   - manual by PT bilaterally while pt supine on mat   - manual stretch with feet on incline board. Manual pressure provided and gentle joint oscillation.   - long duration stretch x5 min with feet on incline board with 15# weights on legs for increased overpressure       neuromuscular re-education activities to improve: Balance, Coordination, and Kinesthetic for 10 minutes. The following activities were included:    Seated EOM: PTA  occasional min A   - 3 x 5 reps trunk uprighting from wedge  - 2 x 10 reps  partial-range sit-ups       therapeutic activities to improve functional performance for 10 minutes, including:    Patient set-up in standing frame and stood for 10 minutes to improve upright tolerance,  prolonged stretch to bilateral ankles, Using strap around ankles to preempt further ankle eversion Patient working on achieving midline symmetrical weight-bearing between BLE      Time includes set-up, removing shoes to improve ease of bed mobility, .       manual therapy techniques: joint Mobilization were applied to the bilateral ankles: for 5 minutes, including:    Bilateral ankle  talotibial and talocrural Grade I-IV mobilizations and distractions to facilitate passive dorsiflexion       Trigger point dry needling techniques were applied to the: right and left gastrocnemius, and right tibialis posterior for 0 minutes, including: NP    1, 40 mm x 0.30 mm needle was applied to each of the following locations and held in stationary position for ~20-40 seconds to allow for twitch response/relaxation of muscle:  - Medial muscle belly of right gastroc  - Lateral muscle belly of right gastroc -- light bleeding noted which subsided after ~45-60 seconds of deep pressure  - Medial muscle belly of left gastroc  - Lateral muscle belly of left gastroc  - Right tibialis posterior      Patient Education and Home Exercises       Education provided:   - HEP, increased use of standing frame, stretching for increased DF , increased time wearing AFOs  - edu re: contraindications based on ankle measurements for ReWalk training    Written Home Exercises Provided: Patient instructed to cont prior HEP. Exercises were reviewed and Taisha was able to demonstrate them prior to the end of the session.  Taisha demonstrated good  understanding of the education provided. See Electronic Medical Record under Patient Instructions for exercises provided during therapy sessions    Assessment     Taisha tolerated today's session very well.  She continues  to be appropriate for skilled physical therapy services per her POC at this time.     Taisha Is progressing well towards her goals.   Patient prognosis is Good.     Patient will continue to benefit from skilled outpatient physical therapy to address the deficits listed in the problem list box on initial evaluation, provide pt/family education and to maximize pt's level of independence in the home and community environment.     Patient's spiritual, cultural and educational needs considered and pt agreeable to plan of care and goals.     Anticipated barriers to physical therapy: distance to clinic    Goals: Short Term Goals: 6 weeks   Pt will receive an individualized home exercise program. On going   Pt will improve bilateral ankle ROM by >/= 5 degrees each. On going   Pt will tolerate standing for >/= 10 minutes in standing frame without changes in vitals. On going   Pt will be seen by ReWalk representative to assess her potential to benefit from standing/training with ReWalk at this time. On going      Long Term Goals: 12 weeks   Pt will be independent with an individualized home exercise program. On going   Pt will improve bilateral ankle ROM by >/= 10 degrees each. On going   Pt will tolerate standing for >/= 20 minutes in standing frame or ReWalk device without changes in vitals. On going   Patient will improve her FOTO score from 37%  to at least 44% for improved self perception of functional mobility.(score 0-100, high score indicates greater level of function) on going     Plan     Cont to progress ankle range of motion and standing frame tolerance    Alan Dobbins PTA

## 2024-07-01 ENCOUNTER — CLINICAL SUPPORT (OUTPATIENT)
Dept: REHABILITATION | Facility: HOSPITAL | Age: 64
End: 2024-07-01
Payer: COMMERCIAL

## 2024-07-01 DIAGNOSIS — M25.672 DECREASED RANGE OF MOTION OF BOTH ANKLES: Primary | ICD-10-CM

## 2024-07-01 DIAGNOSIS — R26.89 DECREASED FUNCTIONAL MOBILITY: ICD-10-CM

## 2024-07-01 DIAGNOSIS — M25.671 DECREASED RANGE OF MOTION OF BOTH ANKLES: Primary | ICD-10-CM

## 2024-07-01 PROCEDURE — 97110 THERAPEUTIC EXERCISES: CPT | Mod: PO

## 2024-07-01 PROCEDURE — 97140 MANUAL THERAPY 1/> REGIONS: CPT | Mod: PO

## 2024-07-01 NOTE — PROGRESS NOTES
[facility]   Physical Therapy Treatment Note / Re-Assessment Note     Name: Taisha Mckoy Goyo  Clinic Number: [id number]     Therapy Diagnosis:   Encounter Diagnoses   Name Primary?    Decreased range of motion of both ankles Yes    Decreased functional mobility        Physician: Order, Paper    Visit Date: 7/1/2024    Physician Orders: PT Eval and Treat  Medical Diagnosis from Referral: Incomplete paraplegia [G82.22]   Evaluation Date: 4/29/2024  Authorization Period Expiration: 12/31/2024  Plan of Care Expiration: 7/26/2024  Visit # / Visits authorized: 12/20  Progress Note Due: 7/26/24     Time In: 11:17  Time Out: 11:59  Total Billable Time: 42 minutes     Precautions: Standard    PTA Visit #: 0/5     Subjective     Patient reports: she has been working on stretching, standing, and walking with her KAFOs. She feels like things have been going well.     She was compliant with home exercise program.  Response to previous treatment: good  Functional change: stable    Pain: 0/10  Location: legs bilateral    Objective      Objective Measures updated at progress report unless specified.     Flexibility: prior to TDN at start of session  Ankle Dorsiflexion (with overpressure)  Right: lacking 19 deg  Left: lacking 8 deg    Flexibility: post TDN + stretching  Ankle Dorsiflexion (with overpressure)  Right: lacking 15 deg  Left: lacking 5 deg    Treatment     Taisha received the treatments listed below:      therapeutic exercises to develop strength, endurance, ROM, and flexibility for 12 minutes including:    Passive ankle dorsiflexion stretching   - manual by PT bilaterally while pt supine on mat   - manual stretch with feet on incline board. Manual pressure provided and gentle joint oscillation.   - long duration stretch x5 min with feet on incline board with 15# weights on legs for increased overpressure     Objective measurements - ROM     neuromuscular re-education activities to improve:  Balance, Coordination, and Kinesthetic for 00 minutes. The following activities were included:  NP      therapeutic activities to improve functional performance for 5 minutes, including:    MWC <> gym mat via scooting mod I  Sit EOM <> supine mod I  Supine <> prone mod I        manual therapy techniques: joint Mobilization were applied to the bilateral ankles: for 25 minutes, including:    Bilateral ankle talotibial and talocrural Grade I-IV mobilizations and distractions to facilitate passive dorsiflexion     Dry Needling performed by Daysi Gale, PT, DPT, NCS. The patient was cleared of all contraindications and educated on the risks and effects of dry needling, and post needling expectations. The patient was agreeable to dry needling treatment. Pt signed consent form pre needling in prior session. Dry Needling was performed using 0.30 mm x 0.40 mm needles to the below noted muscle groups for 5 minutes (total time needle in muscle). No adverse affects were noted.     - Medial muscle belly of right gastroc  - Lateral muscle belly of right gastroc  - Medial muscle belly of left gastroc  - Lateral muscle belly of left gastroc  - Right tibialis posterior    Time includes time spent positioning patient, palpating muscles, finding appropriate trigger point spot, and discussion/edu on TDN.     Patient Education and Home Exercises       Education provided:   - HEP, increased use of standing frame, stretching for increased DF, increased time wearing AFOs  - edu re: contraindications based on ankle measurements for ReWalk training    Written Home Exercises Provided: Patient instructed to cont prior HEP. Exercises were reviewed and Taisha was able to demonstrate them prior to the end of the session.  Taisha demonstrated good  understanding of the education provided. See Electronic Medical Record under Patient Instructions for exercises provided during therapy sessions    Assessment     Taisha responded well to TDN to bilateral  gastroc/soleus and R posterior tib today, demonstrating slightly improved ankle DF range of motion following dry needling + stretching. She continues to perform her home exercise program and work on standing in her KAFOs to maximize upright tolerance and standing balance. However, she still demonstrates a significant inversion in the R ankle at this time that remains a limiting factor to starting ReWalk exoskeleton training.    Taisha Is progressing well towards her goals.   Patient prognosis is Good.     Patient will continue to benefit from skilled outpatient physical therapy to address the deficits listed in the problem list box on initial evaluation, provide pt/family education and to maximize pt's level of independence in the home and community environment.     Patient's spiritual, cultural and educational needs considered and pt agreeable to plan of care and goals.     Anticipated barriers to physical therapy: distance to clinic    Goals: Short Term Goals: 6 weeks   Pt will receive an individualized home exercise program. On going   Pt will improve bilateral ankle ROM by >/= 5 degrees each. On going   Pt will tolerate standing for >/= 10 minutes in standing frame without changes in vitals. On going   Pt will be seen by ReWalk representative to assess her potential to benefit from standing/training with ReWalk at this time. On going      Long Term Goals: 12 weeks   Pt will be independent with an individualized home exercise program. On going   Pt will improve bilateral ankle ROM by >/= 10 degrees each. On going   Pt will tolerate standing for >/= 20 minutes in standing frame or ReWalk device without changes in vitals. On going   Patient will improve her FOTO score from 37%  to at least 44% for improved self perception of functional mobility.(score 0-100, high score indicates greater level of function) on going     Plan     Cont to progress ankle range of motion and standing frame tolerance    Daysi Gale, PT

## 2024-07-05 ENCOUNTER — CLINICAL SUPPORT (OUTPATIENT)
Dept: REHABILITATION | Facility: HOSPITAL | Age: 64
End: 2024-07-05
Payer: COMMERCIAL

## 2024-07-05 DIAGNOSIS — M25.672 DECREASED RANGE OF MOTION OF BOTH ANKLES: Primary | ICD-10-CM

## 2024-07-05 DIAGNOSIS — R26.89 DECREASED FUNCTIONAL MOBILITY: ICD-10-CM

## 2024-07-05 DIAGNOSIS — M25.671 DECREASED RANGE OF MOTION OF BOTH ANKLES: Primary | ICD-10-CM

## 2024-07-05 PROCEDURE — 97110 THERAPEUTIC EXERCISES: CPT | Mod: PO

## 2024-07-05 PROCEDURE — 97112 NEUROMUSCULAR REEDUCATION: CPT | Mod: PO

## 2024-07-05 PROCEDURE — 97140 MANUAL THERAPY 1/> REGIONS: CPT | Mod: PO

## 2024-07-05 NOTE — PROGRESS NOTES
[facility]   Physical Therapy Treatment Note     Name: Taisha Mckoy [facility] Number: [id number]     Therapy Diagnosis:   Encounter Diagnoses   Name Primary?    Decreased range of motion of both ankles Yes    Decreased functional mobility        Physician: Order, Paper    Visit Date: 7/5/2024    Physician Orders: PT Eval and Treat  Medical Diagnosis from Referral: Incomplete paraplegia [G82.22]   Evaluation Date: 4/29/2024  Authorization Period Expiration: 12/31/2024  Plan of Care Expiration: 7/26/2024  Visit # / Visits authorized: 13/20  Progress Note Due: 7/26/24     Time In: 11:15  Time Out: 11:59  Total Billable Time: 44 minutes     Precautions: Standard    PTA Visit #: 0/5     Subjective     Patient reports: she went and watched the [employer] last night and had a good time.      She was compliant with home exercise program.  Response to previous treatment: good  Functional change: stable    Pain: 0/10  Location: legs bilateral    Objective      Objective Measures updated at progress report unless specified.     Flexibility: post TDN + stretching  Ankle Dorsiflexion (with overpressure)  Right: lacking 16 deg  Left: lacking 5 deg    Treatment     Taisha received the treatments listed below:      therapeutic exercises to develop strength, endurance, ROM, and flexibility for 16 minutes including:    Passive ankle dorsiflexion stretching   - manual by PT bilaterally while pt supine on mat   - manual stretch with feet on incline board. Manual pressure provided and gentle joint oscillation.   - manual stretch with feet flat on floor, PT providing manual pressure to achieve more neutral positioning (less inversion noted)   - long duration stretch x5 min with feet on incline board with 15# weights on legs for increased overpressure     Objective measurements - ROM     neuromuscular re-education activities to improve: Balance, Coordination, and Kinesthetic for 5 minutes. The following  activities were included:    Seated EOM: (unsupported)   - anteroposterior trunk lean to limits of stability  - lateral trunk lean to limits of stability   - lateral pelvic tilts L/R       therapeutic activities to improve functional performance for 5 minutes, including:    MWC <> gym mat via scooting mod I  Sit EOM <> supine mod I  Supine <> prone mod I        manual therapy techniques: joint Mobilization were applied to the bilateral ankles: for 18 minutes, including:    Bilateral ankle talotibial and talocrural Grade I-IV mobilizations and distractions to facilitate passive dorsiflexion     Dry Needling performed by Daysi Gale, PT, DPT, NCS. The patient was cleared of all contraindications and educated on the risks and effects of dry needling, and post needling expectations. The patient was agreeable to dry needling treatment. Pt signed consent form pre needling in prior session. Dry Needling was performed using 0.30 mm x 0.40 mm needles to the below noted muscle groups for 6 minutes (total time needle in muscle). No adverse affects were noted.     - Medial muscle belly of right gastroc  - Lateral muscle belly of right gastroc  - Muscle belly of right soleus   - Medial muscle belly of left gastroc  - Lateral muscle belly of left gastroc  - Right tibialis posterior (small drop of blood, stopped after pressure applied)     Time includes time spent positioning patient, palpating muscles, finding appropriate trigger point spot, and discussion/edu on TDN.     Patient Education and Home Exercises       Education provided:   - HEP, increased use of standing frame, stretching for increased DF, increased time wearing AFOs  - edu re: contraindications based on ankle measurements for ReWalk training    Written Home Exercises Provided: Patient instructed to cont prior HEP. Exercises were reviewed and Taisha was able to demonstrate them prior to the end of the session.  Taisha demonstrated good  understanding of the education  provided. See Electronic Medical Record under Patient Instructions for exercises provided during therapy sessions    Assessment     Taisha continues to tolerate TDN well with only adverse effect of small drop of blood with posterior tibialis that quickly stopped with pressure. She still is limited with ankle range of motion on the right side as inversion is still prominent when applying pressure/stretching into DF. However, left ankle continues to progress appropriately with applied interventions and she remains a good candidate for OP neuro PT to hopefully progress to ReWalk training.     Taisha Is progressing well towards her goals.   Patient prognosis is Good.     Patient will continue to benefit from skilled outpatient physical therapy to address the deficits listed in the problem list box on initial evaluation, provide pt/family education and to maximize pt's level of independence in the home and community environment.     Patient's spiritual, cultural and educational needs considered and pt agreeable to plan of care and goals.     Anticipated barriers to physical therapy: distance to clinic    Goals: Short Term Goals: 6 weeks   Pt will receive an individualized home exercise program. On going   Pt will improve bilateral ankle ROM by >/= 5 degrees each. On going   Pt will tolerate standing for >/= 10 minutes in standing frame without changes in vitals. On going   Pt will be seen by ReWalk representative to assess her potential to benefit from standing/training with ReWalk at this time. On going      Long Term Goals: 12 weeks   Pt will be independent with an individualized home exercise program. On going   Pt will improve bilateral ankle ROM by >/= 10 degrees each. On going   Pt will tolerate standing for >/= 20 minutes in standing frame or ReWalk device without changes in vitals. On going   Patient will improve her FOTO score from 37%  to at least 44% for improved self perception of functional mobility.(score  0-100, high score indicates greater level of function) on going     Plan     Cont to progress ankle range of motion and standing frame tolerance    Daysi Gale, PT

## 2024-07-08 ENCOUNTER — CLINICAL SUPPORT (OUTPATIENT)
Dept: REHABILITATION | Facility: HOSPITAL | Age: 64
End: 2024-07-08
Payer: MEDICARE

## 2024-07-08 DIAGNOSIS — M25.672 DECREASED RANGE OF MOTION OF BOTH ANKLES: Primary | ICD-10-CM

## 2024-07-08 DIAGNOSIS — M25.671 DECREASED RANGE OF MOTION OF BOTH ANKLES: Primary | ICD-10-CM

## 2024-07-08 DIAGNOSIS — R26.89 DECREASED FUNCTIONAL MOBILITY: ICD-10-CM

## 2024-07-08 PROCEDURE — 97112 NEUROMUSCULAR REEDUCATION: CPT | Mod: PO

## 2024-07-08 PROCEDURE — 97530 THERAPEUTIC ACTIVITIES: CPT | Mod: PO

## 2024-07-08 PROCEDURE — 97140 MANUAL THERAPY 1/> REGIONS: CPT | Mod: PO

## 2024-07-08 NOTE — PROGRESS NOTES
[facility]   Physical Therapy Treatment Note     Name: Taisha Mckoy [facility] Number: [id number]     Therapy Diagnosis:   Encounter Diagnoses   Name Primary?    Decreased range of motion of both ankles Yes    Decreased functional mobility          Physician: Order, Paper    Visit Date: 7/8/2024    Physician Orders: PT Eval and Treat  Medical Diagnosis from Referral: Incomplete paraplegia [G82.22]   Evaluation Date: 4/29/2024  Authorization Period Expiration: 12/31/2024  Plan of Care Expiration: 7/26/2024  Visit # / Visits authorized: 13/20  Progress Note Due: 7/26/24     Time In: 11:13  Time Out: 11:56  Total Billable Time: 43 minutes     Precautions: Standard    PTA Visit #: 0/5     Subjective     Patient reports: she had a good weekend, no new complaints.     She was compliant with home exercise program.  Response to previous treatment: good  Functional change: stable    Pain: 0/10  Location: legs bilateral    Objective      Objective Measures updated at progress report unless specified.     Flexibility: post TDN + stretching  Ankle Dorsiflexion (with overpressure)  Right: lacking 12 deg  Left: lacking 4 deg    Treatment     aTisha received the treatments listed below:      therapeutic exercises to develop strength, endurance, ROM, and flexibility for 5 minutes including:    Passive ankle dorsiflexion stretching - not performed today   - manual by PT bilaterally while pt supine on mat   - manual stretch with feet on incline board. Manual pressure provided and gentle joint oscillation.   - manual stretch with feet flat on floor, PT providing manual pressure to achieve more neutral positioning (less inversion noted)   - long duration stretch x5 min with feet on incline board with 15# weights on legs for increased overpressure     Objective measurements - ROM as above    neuromuscular re-education activities to improve: Balance, Coordination, and Kinesthetic for 12 minutes. The following  activities were included:    While in standing frame for core strengthening:  - 2 x 10 reps of 1kg rainbow pass overhead  - 2 x 10 reps of 1kg shoulder presses   - 2 x 10 reps of 1kg chest presses     Mini squats while in sling-back standing frame: 2 x 10 reps with PT facilitation and BUE support/assist with achieving full extension     therapeutic activities to improve functional performance for 18 minutes, including:    MWC <> gym mat via scooting mod I  Sit EOM <> supine mod I  Supine <> prone mod I      Patient set-up in sling-back standing frame x20 minutes to work on upright tolerance, provide prolonged stretch to bilateral lower extremities, and perform core/trunk control work (see above for trunk control interventions)         manual therapy techniques: joint Mobilization were applied to the bilateral ankles: for 8 minutes, including:    Bilateral ankle talotibial and talocrural Grade I-IV mobilizations and distractions to facilitate passive dorsiflexion     Dry Needling performed by Daysi Gale, PT, DPT, NCS. The patient was cleared of all contraindications and educated on the risks and effects of dry needling, and post needling expectations. The patient was agreeable to dry needling treatment. Pt signed consent form pre needling in prior session. Dry Needling was performed using 0.30 mm x 0.40 mm needles to the below noted muscle groups for 3 minutes (total time needle in muscle). No adverse affects were noted.     - Muscle belly of right soleus   - Right tibialis posterior   - Muscle belly of left soleus     Time includes time spent positioning patient, palpating muscles, finding appropriate trigger point spot, and discussion/edu on TDN.     Patient Education and Home Exercises       Education provided:   - HEP, increased use of standing frame, stretching for increased DF, increased time wearing AFOs  - edu re: contraindications based on ankle measurements for ReWalk training    Written Home Exercises  Provided: Patient instructed to cont prior HEP. Exercises were reviewed and Taisha was able to demonstrate them prior to the end of the session.  Taisha demonstrated good  understanding of the education provided. See Electronic Medical Record under Patient Instructions for exercises provided during therapy sessions    Assessment     Taisha continues to make improvements in ankle range of motion, demonstrating the best response to TDN + stretching to date utilizing the standing frame for more weight bearing. Included more trunk control/core work while upright to prepare for possible use of ReWalk exoskeleton. She was limited in shifting weight laterally to the right mainly due to significant inversion at the R ankle, impairing ability to put weight through right lower extremity. She remains appropriate for OP neuro PT to facilitate range of motion progress.     Taisha Is progressing well towards her goals.   Patient prognosis is Good.     Patient will continue to benefit from skilled outpatient physical therapy to address the deficits listed in the problem list box on initial evaluation, provide pt/family education and to maximize pt's level of independence in the home and community environment.     Patient's spiritual, cultural and educational needs considered and pt agreeable to plan of care and goals.     Anticipated barriers to physical therapy: distance to clinic    Goals: Short Term Goals: 6 weeks   Pt will receive an individualized home exercise program. On going   Pt will improve bilateral ankle ROM by >/= 5 degrees each. On going   Pt will tolerate standing for >/= 10 minutes in standing frame without changes in vitals. On going   Pt will be seen by ReWalk representative to assess her potential to benefit from standing/training with ReWalk at this time. On going      Long Term Goals: 12 weeks   Pt will be independent with an individualized home exercise program. On going   Pt will improve bilateral ankle ROM by >/=  10 degrees each. On going   Pt will tolerate standing for >/= 20 minutes in standing frame or ReWalk device without changes in vitals. On going   Patient will improve her FOTO score from 37%  to at least 44% for improved self perception of functional mobility.(score 0-100, high score indicates greater level of function) on going     Plan     Cont to progress ankle range of motion and standing frame tolerance    Daysi Gale, PT

## 2024-07-10 ENCOUNTER — CLINICAL SUPPORT (OUTPATIENT)
Dept: REHABILITATION | Facility: HOSPITAL | Age: 64
End: 2024-07-10
Payer: MEDICARE

## 2024-07-10 DIAGNOSIS — M25.671 DECREASED RANGE OF MOTION OF BOTH ANKLES: Primary | ICD-10-CM

## 2024-07-10 DIAGNOSIS — M25.672 DECREASED RANGE OF MOTION OF BOTH ANKLES: Primary | ICD-10-CM

## 2024-07-10 DIAGNOSIS — R26.89 DECREASED FUNCTIONAL MOBILITY: ICD-10-CM

## 2024-07-10 PROCEDURE — 97110 THERAPEUTIC EXERCISES: CPT | Mod: PO

## 2024-07-10 PROCEDURE — 97530 THERAPEUTIC ACTIVITIES: CPT | Mod: PO

## 2024-07-10 PROCEDURE — 97140 MANUAL THERAPY 1/> REGIONS: CPT | Mod: PO

## 2024-07-10 PROCEDURE — 97112 NEUROMUSCULAR REEDUCATION: CPT | Mod: PO

## 2024-07-10 NOTE — PROGRESS NOTES
[facility] OUTPATIENT THERAPY AND WELLNESS   Physical Therapy Treatment Note     Name: Taisha Mckoy [facility] Number: [id number]     Therapy Diagnosis:   Encounter Diagnoses   Name Primary?    Decreased range of motion of both ankles Yes    Decreased functional mobility        Physician: Order, Paper    Visit Date: 7/10/2024    Physician Orders: PT Eval and Treat  Medical Diagnosis from Referral: Incomplete paraplegia [G82.22]   Evaluation Date: 4/29/2024  Authorization Period Expiration: 12/31/2024  Plan of Care Expiration: 7/26/2024  Visit # / Visits authorized: 14/20  Progress Note Due: 7/26/24     Time In: 10:29  Time Out: 11:14  Total Billable Time: 45 minutes     Precautions: Standard    PTA Visit #: 0/5     Subjective     Patient reports: she stood in her standing frame yesterday and for 10 min earlier today. She's hoping to get a few appts in next week since her next one isn't until the end of July.     She was compliant with home exercise program.  Response to previous treatment: good  Functional change: stable    Pain: 0/10  Location: legs bilateral    Objective      Objective Measures updated at progress report unless specified.     Flexibility: post TDN + stretching  Ankle Dorsiflexion (with overpressure)  Right: lacking 13 deg  Left: lacking 4 deg    Treatment     Taisha received the treatments listed below:      therapeutic exercises to develop strength, endurance, ROM, and flexibility for 8 minutes including:    Objective measurements - ROM as above    Manual bilateral ankle DF stretch by PT bilaterally while pt supine on mat    Passive ankle dorsiflexion stretching - not performed today    - manual stretch with feet on incline board. Manual pressure provided and gentle joint oscillation.   - manual stretch with feet flat on floor, PT providing manual pressure to achieve more neutral positioning (less inversion noted)   - long duration stretch x5 min with feet on incline board with 15# weights on legs  for increased overpressure       neuromuscular re-education activities to improve: Balance, Coordination, and Kinesthetic for 15 minutes. The following activities were included:    While in standing frame for core strengthening:  - lateral weight shifts L/R focusing on not using upper extremity to assist  - using trunk positioning and RUE positioning for maintaining R sided weight shift  - mini squats while in sling-back standing frame: 2 x 10 reps with PT facilitation and BUE support/assist with achieving full extension       therapeutic activities to improve functional performance for 12 minutes, including:    MWC <> gym mat via scooting mod I  Sit EOM <> supine mod I  Supine <> prone mod I      Patient set-up in sling-back standing frame x15 minutes to work on upright tolerance, provide prolonged stretch to bilateral lower extremities, and perform core/trunk control work (see above in NMR for trunk control interventions)       manual therapy techniques: joint Mobilization were applied to the bilateral ankles: for 10 minutes, including:    Bilateral ankle talotibial and talocrural Grade I-IV mobilizations and distractions to facilitate passive dorsiflexion     Dry Needling performed by Daysi Gale, PT, DPT, NCS. The patient was cleared of all contraindications and educated on the risks and effects of dry needling, and post needling expectations. The patient was agreeable to dry needling treatment. Pt signed consent form pre needling in prior session. Dry Needling was performed using 0.30 mm x 0.40 mm needles to the below noted muscle groups for 3 minutes (total time needle in muscle). No adverse affects were noted.     - Muscle belly of right soleus   - Right tibialis posterior   - Muscle belly of left soleus     Time includes time spent positioning patient, palpating muscles, finding appropriate trigger point spot, and discussion/edu on TDN.     Patient Education and Home Exercises       Education provided:    - HEP, increased use of standing frame, stretching for increased DF, increased time wearing AFOs  - edu re: contraindications based on ankle measurements for ReWalk training    Written Home Exercises Provided: Patient instructed to cont prior HEP. Exercises were reviewed and Taisha was able to demonstrate them prior to the end of the session.  Taisha demonstrated good  understanding of the education provided. See Electronic Medical Record under Patient Instructions for exercises provided during therapy sessions    Assessment     Taisha demonstrated slightly better ability to weight shift towards the right side today, though still limited by hip/ankle range of motion and positioning that leads to a left-sided tendency at rest. She has maintained recent gains for ankle DF and has slightly reduced inversion noted at the right ankle but still not yet ready for ReWalk exoskeleton training. Advised pt to continue current home program to prevent decline as next PT appt is not for a few weeks.     Taisha Is progressing well towards her goals.   Patient prognosis is Good.     Patient will continue to benefit from skilled outpatient physical therapy to address the deficits listed in the problem list box on initial evaluation, provide pt/family education and to maximize pt's level of independence in the home and community environment.     Patient's spiritual, cultural and educational needs considered and pt agreeable to plan of care and goals.     Anticipated barriers to physical therapy: distance to clinic    Goals: Short Term Goals: 6 weeks   Pt will receive an individualized home exercise program. On going   Pt will improve bilateral ankle ROM by >/= 5 degrees each. On going   Pt will tolerate standing for >/= 10 minutes in standing frame without changes in vitals. On going   Pt will be seen by ReWalk representative to assess her potential to benefit from standing/training with ReWalk at this time. On going      Long Term Goals:  12 weeks   Pt will be independent with an individualized home exercise program. On going   Pt will improve bilateral ankle ROM by >/= 10 degrees each. On going   Pt will tolerate standing for >/= 20 minutes in standing frame or ReWalk device without changes in vitals. On going   Patient will improve her FOTO score from 37%  to at least 44% for improved self perception of functional mobility.(score 0-100, high score indicates greater level of function) on going     Plan     Cont to progress ankle range of motion and standing frame tolerance    Daysi Gale, PT

## 2024-07-31 ENCOUNTER — CLINICAL SUPPORT (OUTPATIENT)
Dept: REHABILITATION | Facility: HOSPITAL | Age: 64
End: 2024-07-31
Payer: MEDICARE

## 2024-07-31 DIAGNOSIS — M25.672 DECREASED RANGE OF MOTION OF BOTH ANKLES: Primary | ICD-10-CM

## 2024-07-31 DIAGNOSIS — R26.89 DECREASED FUNCTIONAL MOBILITY: ICD-10-CM

## 2024-07-31 DIAGNOSIS — M25.671 DECREASED RANGE OF MOTION OF BOTH ANKLES: Primary | ICD-10-CM

## 2024-07-31 PROCEDURE — 97530 THERAPEUTIC ACTIVITIES: CPT | Mod: KX,PO

## 2024-07-31 PROCEDURE — 97112 NEUROMUSCULAR REEDUCATION: CPT | Mod: KX,PO

## 2024-07-31 NOTE — PROGRESS NOTES
"  [facility]PATIENT THERAPY AND [facility]LLNESS   Physical Therapy Treatment Note     Name: Taisha Mckoy [facility]  Clinic Number: [id number]     Therapy Diagnosis:   Encounter Diagnoses   Name Primary?    Decreased range of motion of both ankles Yes    Decreased functional mobility      Physician: Order, Paper    Visit Date: 7/31/2024    Physician Orders: PT Eval and Treat  Medical Diagnosis from Referral: Incomplete paraplegia [G82.22]   Evaluation Date: 4/29/2024  Authorization Period Expiration: 12/31/2024  Plan of Care Expiration: 7/26/2024  Updated Plan of Care Expiration: 8/16/2024  Visit # / Visits authorized: 16/20  Progress Note Due: 7/26/24     Time In: 1031  Time Out: 1115  Total Billable Time: 44 minutes     Precautions: Standard    PTA Visit #: 0/5     Subjective     Patient reports: she is feeling good today but a little nervous about her upcoming re-evaluation. States that she has continued to perform stretching and utilize her standing frame since she has had a gap in therapy.     She was compliant with home exercise program.  Response to previous treatment: good  Functional change: stable    Pain: 0/10  Location: legs bilateral    Objective      Objective Measures updated at progress report unless specified.     Flexibility: Pre-standing frame  Ankle Dorsiflexion  Right: lacking 23 deg  Left: lacking 12 deg     Standing frame tolerance: x13 min 30 sec     Flexibility: post standing frame + stretching  Ankle Dorsiflexion (with overpressure)  Right: lacking 21 deg  Left: lacking 9 deg      Treatment     Taisha received the treatments listed below:      therapeutic exercises to develop strength, endurance, ROM, and flexibility for 00 minutes including:    NP       neuromuscular re-education activities to improve: Balance, Coordination, and Kinesthetic for 17 minutes. The following activities were included:    While in standing frame (13'30") for core strengthening:  - 2 x 15 reps, standing chest press with 2 kg med " ball  - x 5 reps B, lateral weight shifts L/R focusing on not using upper extremity to assist  - Mini squats while in sling-back standing frame: x 10 reps with PT facilitation and BUE support/assist with achieving full extension     --> Total activity time includes time setting up standing frame, harness, and foot plates for optimal patient positioning      therapeutic activities to improve functional performance for 24 minutes, including:    Manual bilateral ankle DF stretch by PT bilaterally while pt supine on mat x 30 sec bilaterally prior to measurements    Objective measurements performed above  MWC <> gym mat via scooting mod I  Sit EOM <> supine mod I    Patient set-up in sling-back standing frame x13 min 30 sec to work on upright tolerance, provide prolonged stretch to bilateral lower extremities, and perform core/trunk control work (see above in NMR for trunk control interventions)     Time included here to discuss POC recommendations      manual therapy techniques: joint Mobilization were applied to the bilateral ankles: for 00 minutes, including:    NP      Patient Education and Home Exercises       Education provided:   - HEP, increased use of standing frame, stretching for increased DF, increased time wearing AFOs  - edu re: contraindications based on ankle measurements for ReWalk training    Written Home Exercises Provided: Patient instructed to cont prior HEP. Exercises were reviewed and Taisha was able to demonstrate them prior to the end of the session.  Taisha demonstrated good  understanding of the education provided. See Electronic Medical Record under Patient Instructions for exercises provided during therapy sessions    Assessment     Taisha Nance tolerated today's session well with a focus on reassessment of progress towards goals. She was able to tolerate performing the measures listed above with gradual improvements noted in her ankle ROM since her evaluation. To date, she has met 3/4  short and 1/4 long-term goals established at her initial evaluation and prior progress notes. She remains appropriate for skilled physical therapy services at their current frequency for an additional 3 weeks beyond her established plan of care to continue addressing her functional deficits and goals related to improved ankle ROM. PT and patient discussed transferring to orthopedic physical therapy soon to continue addressing her ROM limitations following discharge from neuro PT.      Taisha Is progressing well towards her goals.   Patient prognosis is Good.     Patient will continue to benefit from skilled outpatient physical therapy to address the deficits listed in the problem list box on initial evaluation, provide pt/family education and to maximize pt's level of independence in the home and community environment.     Patient's spiritual, cultural and educational needs considered and pt agreeable to plan of care and goals.     Anticipated barriers to physical therapy: distance to clinic    Goals: Short Term Goals: 6 weeks   Pt will receive an individualized home exercise program. Met 7/31/24  Pt will improve bilateral ankle ROM by >/= 5 degrees each. Met 7/31/24  Pt will tolerate standing for >/= 10 minutes in standing frame without changes in vitals. Met 7/31/24  Pt will be seen by ReWalk representative to assess her potential to benefit from standing/training with ReWalk at this time. On going      Long Term Goals: 12 weeks   Pt will be independent with an individualized home exercise program. Met 7/31/24   Pt will improve bilateral ankle ROM by >/= 10 degrees each. On going   Pt will tolerate standing for >/= 20 minutes in standing frame or ReWalk device without changes in vitals. On going   Patient will improve her FOTO score from 37%  to at least 44% for improved self perception of functional mobility.(score 0-100, high score indicates greater level of function) on going     Plan     Extend PT POC from  7/26/24 to 8/16/24.    Cont to progress ankle range of motion and standing frame tolerance    Carolina Mayes, PT

## 2024-08-01 ENCOUNTER — CLINICAL SUPPORT (OUTPATIENT)
Dept: REHABILITATION | Facility: HOSPITAL | Age: 64
End: 2024-08-01
Payer: MEDICARE

## 2024-08-01 DIAGNOSIS — M25.671 DECREASED RANGE OF MOTION OF BOTH ANKLES: Primary | ICD-10-CM

## 2024-08-01 DIAGNOSIS — M25.672 DECREASED RANGE OF MOTION OF BOTH ANKLES: Primary | ICD-10-CM

## 2024-08-01 DIAGNOSIS — R26.89 DECREASED FUNCTIONAL MOBILITY: ICD-10-CM

## 2024-08-01 PROCEDURE — 97530 THERAPEUTIC ACTIVITIES: CPT | Mod: KX,PO

## 2024-08-01 PROCEDURE — 97112 NEUROMUSCULAR REEDUCATION: CPT | Mod: KX,PO

## 2024-08-01 PROCEDURE — 97140 MANUAL THERAPY 1/> REGIONS: CPT | Mod: KX,PO

## 2024-08-01 NOTE — PROGRESS NOTES
[facility]   Physical Therapy Treatment Note     Name: Taisha Mckoy [facility] Number: [id number]     Therapy Diagnosis:   Encounter Diagnoses   Name Primary?    Decreased range of motion of both ankles Yes    Decreased functional mobility      Physician: Order, Paper    Visit Date: 8/1/2024    Physician Orders: PT Eval and Treat  Medical Diagnosis from Referral: Incomplete paraplegia [G82.22]   Evaluation Date: 4/29/2024  Authorization Period Expiration: 12/31/2024  Plan of Care Expiration: 7/26/2024  Updated Plan of Care Expiration: 8/16/2024  Visit # / Visits authorized: 17/20  Progress Note Due: 7/26/24     Time In: 1430  Time Out: 1521  Total Billable Time: 51 minutes     Precautions: Standard    PTA Visit #: 0/5     Subjective     Patient reports: she is feeling good today but a little nervous about her upcoming re-evaluation. States that she has continued to perform stretching and utilize her standing frame since she has had a gap in therapy.     She was compliant with home exercise program.  Response to previous treatment: good  Functional change: stable    Pain: 0/10  Location: legs bilateral    Objective      Objective Measures updated on 7/31/24.     Flexibility: post standing frame + stretching (7/31)  Ankle Dorsiflexion (with overpressure)  Right: lacking 21 deg  Left: lacking 9 deg    Flexibility: post TDN + standing frame (8/1)  Ankle Dorsiflexion  Right: lacking 10 deg  Left: lacking 5 deg    Treatment     Taisha received the treatments listed below:      therapeutic exercises to develop strength, endurance, ROM, and flexibility for 00 minutes including:    NP       neuromuscular re-education activities to improve: Balance, Coordination, and Kinesthetic for 12 minutes. The following activities were included:    While in standing frame (10 min) for core strengthening:  - 2 x 15 reps, Standing chest press with 2 kg med ball  - 2 x 15 reps, Standing shoulder press with 2 kg  med ball  - x 45 sec, Lateral weight shifts L/R with minimal   - x 45 sec, Mini squats while in sling-back standing frame with PT facilitation and BUE support/assist with achieving full extension     --> Total activity time includes time setting up standing frame, harness, and foot plates for optimal patient positioning      therapeutic activities to improve functional performance for 22 minutes, including:    MWC <> gym mat via scooting mod I    Sit EOM <> supine <> prone mod I    Patient set-up in sling-back standing frame x 10 min to work on upright tolerance, provide prolonged stretch to bilateral lower extremities, and perform core/trunk control work (see above in NMR for trunk control interventions)     Time included here to measure bilateral ankle ROM       manual therapy techniques: joint Mobilization were applied to the bilateral ankles: for 17 minutes, including:    Bilateral ankle talotibial and talocrural Grade I-IV mobilizations and distractions to facilitate passive dorsiflexion x 60 seconds to each leg prior to trigger point dry needling (TDN)    Dry Needling performed by Carolina Mayes, PT, DPT, NCS. The patient was cleared of all contraindications and educated on the risks and effects of dry needling, and post needling expectations. The patient was agreeable to dry needling treatment. Patient signed informed consent form prior to receiving any interventions. Dry Needling was performed using 0.30 mm x 40 mm needles (8) to the below noted muscle groups for 7 minutes (total time needle in muscle). No adverse affects were noted.   - right Soleus muscle belly (2)  - left soleus muscle belly (2)  - right posterior tibialis (2)  - left posterior tibialis (2)    Soft tissue mobilization provided to muscles above following needling to assist in tissue relaxation       Patient Education and Home Exercises       Education provided:   - HEP, increased use of standing frame, stretching for increased DF, increased  time wearing AFOs  - edu re: contraindications based on ankle measurements for ReWalk training    Written Home Exercises Provided: Patient instructed to cont prior HEP. Exercises were reviewed and Taisha was able to demonstrate them prior to the end of the session.  Taisha demonstrated good  understanding of the education provided. See Electronic Medical Record under Patient Instructions for exercises provided during therapy sessions    Assessment     Taisha Nance tolerated today's session well with a focus on dry needling and prolonged stretching. She continues to respond well to both interventions with good twitch responses noted in each location. She showed a significant improvement in her DF ROM following needling and prolonged stretching in the standing frame and she continues to be appropriate for skilled physical therapy services to maintain this PROM. PT to continue per established plan of care with plan to transfer to orthopedic PT to maximize her joint ROM.      Taisha Is progressing well towards her goals.   Patient prognosis is Good.     Patient will continue to benefit from skilled outpatient physical therapy to address the deficits listed in the problem list box on initial evaluation, provide pt/family education and to maximize pt's level of independence in the home and community environment.     Patient's spiritual, cultural and educational needs considered and pt agreeable to plan of care and goals.     Anticipated barriers to physical therapy: distance to clinic    Goals: Short Term Goals: 6 weeks   Pt will receive an individualized home exercise program. Met 7/31/24  Pt will improve bilateral ankle ROM by >/= 5 degrees each. Met 7/31/24  Pt will tolerate standing for >/= 10 minutes in standing frame without changes in vitals. Met 7/31/24  Pt will be seen by ReWalk representative to assess her potential to benefit from standing/training with ReWalk at this time. On going      Long Term Goals: 12  weeks   Pt will be independent with an individualized home exercise program. Met 7/31/24   Pt will improve bilateral ankle ROM by >/= 10 degrees each. On going   Pt will tolerate standing for >/= 20 minutes in standing frame or ReWalk device without changes in vitals. On going   Patient will improve her FOTO score from 37%  to at least 44% for improved self perception of functional mobility.(score 0-100, high score indicates greater level of function) on going     Plan     Extend PT POC from 7/26/24 to 8/16/24.    Cont to progress ankle range of motion and standing frame tolerance    Carolina Mayes, PT

## 2024-08-01 NOTE — PLAN OF CARE
"  [facility] OUTPATIENT THERAPY AND WELLNESS   Physical Therapy Treatment Note     Name: Taisha Mckoy [facility]  Clinic Number: [id number]     Therapy Diagnosis:   Encounter Diagnoses   Name Primary?    Decreased range of motion of both ankles Yes    Decreased functional mobility      Physician: Order, Paper    Visit Date: 7/31/2024    Physician Orders: PT Eval and Treat  Medical Diagnosis from Referral: Incomplete paraplegia [G82.22]   Evaluation Date: 4/29/2024  Authorization Period Expiration: 12/31/2024  Plan of Care Expiration: 7/26/2024  Updated Plan of Care Expiration: 8/16/2024  Visit # / Visits authorized: 16/20  Progress Note Due: 7/26/24     Time In: 1031  Time Out: 1115  Total Billable Time: 44 minutes     Precautions: Standard    PTA Visit #: 0/5     Subjective     Patient reports: she is feeling good today but a little nervous about her upcoming re-evaluation. States that she has continued to perform stretching and utilize her standing frame since she has had a gap in therapy.     She was compliant with home exercise program.  Response to previous treatment: good  Functional change: stable    Pain: 0/10  Location: legs bilateral    Objective      Objective Measures updated at progress report unless specified.     Flexibility: Pre-standing frame  Ankle Dorsiflexion  Right: lacking 23 deg  Left: lacking 12 deg     Standing frame tolerance: x13 min 30 sec     Flexibility: post standing frame + stretching  Ankle Dorsiflexion (with overpressure)  Right: lacking 21 deg  Left: lacking 9 deg      Treatment     Taisha received the treatments listed below:      therapeutic exercises to develop strength, endurance, ROM, and flexibility for 00 minutes including:    NP       neuromuscular re-education activities to improve: Balance, Coordination, and Kinesthetic for 17 minutes. The following activities were included:    While in standing frame (13'30") for core strengthening:  - 2 x 15 reps, standing chest press with 2 kg med " ball  - x 5 reps B, lateral weight shifts L/R focusing on not using upper extremity to assist  - Mini squats while in sling-back standing frame: x 10 reps with PT facilitation and BUE support/assist with achieving full extension     --> Total activity time includes time setting up standing frame, harness, and foot plates for optimal patient positioning      therapeutic activities to improve functional performance for 24 minutes, including:    Manual bilateral ankle DF stretch by PT bilaterally while pt supine on mat x 30 sec bilaterally prior to measurements    Objective measurements performed above  MWC <> gym mat via scooting mod I  Sit EOM <> supine mod I    Patient set-up in sling-back standing frame x13 min 30 sec to work on upright tolerance, provide prolonged stretch to bilateral lower extremities, and perform core/trunk control work (see above in NMR for trunk control interventions)     Time included here to discuss POC recommendations      manual therapy techniques: joint Mobilization were applied to the bilateral ankles: for 00 minutes, including:    NP      Patient Education and Home Exercises       Education provided:   - HEP, increased use of standing frame, stretching for increased DF, increased time wearing AFOs  - edu re: contraindications based on ankle measurements for ReWalk training    Written Home Exercises Provided: Patient instructed to cont prior HEP. Exercises were reviewed and Taisha was able to demonstrate them prior to the end of the session.  Taisha demonstrated good  understanding of the education provided. See Electronic Medical Record under Patient Instructions for exercises provided during therapy sessions    Assessment     Taisha Nance tolerated today's session well with a focus on reassessment of progress towards goals. She was able to tolerate performing the measures listed above with gradual improvements noted in her ankle ROM since her evaluation. To date, she has met 3/4  short and 1/4 long-term goals established at her initial evaluation and prior progress notes. She remains appropriate for skilled physical therapy services at their current frequency for an additional 3 weeks beyond her established plan of care to continue addressing her functional deficits and goals related to improved ankle ROM. PT and patient discussed transferring to orthopedic physical therapy soon to continue addressing her ROM limitations following discharge from neuro PT.      Taisha Is progressing well towards her goals.   Patient prognosis is Good.     Patient will continue to benefit from skilled outpatient physical therapy to address the deficits listed in the problem list box on initial evaluation, provide pt/family education and to maximize pt's level of independence in the home and community environment.     Patient's spiritual, cultural and educational needs considered and pt agreeable to plan of care and goals.     Anticipated barriers to physical therapy: distance to clinic    Goals: Short Term Goals: 6 weeks   Pt will receive an individualized home exercise program. Met 7/31/24  Pt will improve bilateral ankle ROM by >/= 5 degrees each. Met 7/31/24  Pt will tolerate standing for >/= 10 minutes in standing frame without changes in vitals. Met 7/31/24  Pt will be seen by ReWalk representative to assess her potential to benefit from standing/training with ReWalk at this time. On going      Long Term Goals: 12 weeks   Pt will be independent with an individualized home exercise program. Met 7/31/24   Pt will improve bilateral ankle ROM by >/= 10 degrees each. On going   Pt will tolerate standing for >/= 20 minutes in standing frame or ReWalk device without changes in vitals. On going   Patient will improve her FOTO score from 37%  to at least 44% for improved self perception of functional mobility.(score 0-100, high score indicates greater level of function) on going     Plan     Extend PT POC from  7/26/24 to 8/16/24.    Cont to progress ankle range of motion and standing frame tolerance    Carolina Mayes, PT

## 2024-08-06 ENCOUNTER — CLINICAL SUPPORT (OUTPATIENT)
Dept: REHABILITATION | Facility: HOSPITAL | Age: 64
End: 2024-08-06
Payer: MEDICARE

## 2024-08-06 DIAGNOSIS — G82.22 INCOMPLETE PARAPLEGIA: Primary | ICD-10-CM

## 2024-08-06 DIAGNOSIS — M25.672 DECREASED RANGE OF MOTION OF BOTH ANKLES: Primary | ICD-10-CM

## 2024-08-06 DIAGNOSIS — R26.89 DECREASED FUNCTIONAL MOBILITY: ICD-10-CM

## 2024-08-06 DIAGNOSIS — M25.671 DECREASED RANGE OF MOTION OF BOTH ANKLES: Primary | ICD-10-CM

## 2024-08-06 PROCEDURE — 97530 THERAPEUTIC ACTIVITIES: CPT | Mod: KX,PO

## 2024-08-06 PROCEDURE — 97140 MANUAL THERAPY 1/> REGIONS: CPT | Mod: KX,PO

## 2024-08-06 PROCEDURE — 97112 NEUROMUSCULAR REEDUCATION: CPT | Mod: KX,PO

## 2024-08-08 NOTE — PROGRESS NOTES
[facility]   Physical Therapy Treatment Note     Name: Taisha Mckoy [facility] Number: [id number]     Therapy Diagnosis:   Encounter Diagnoses   Name Primary?    Decreased range of motion of both ankles Yes    Decreased functional mobility        Physician: Order, Paper    Visit Date: 8/9/2024    Physician Orders: PT Eval and Treat  Medical Diagnosis from Referral: Incomplete paraplegia [G82.22]   Evaluation Date: 4/29/2024  Authorization Period Expiration: 12/31/2024  Plan of Care Expiration: 7/26/2024  Updated Plan of Care Expiration: 8/16/2024  Visit # / Visits authorized: 18/20  Progress Note Due: 7/26/24     Time In: 1133 (late arrival, PT unable to accommodate due to schedule)  Time Out: 1200  Total Billable Time: 27 minutes     Precautions: Standard    PTA Visit #: 0/5     Subjective     Patient reports: She is feeling good this morning. Denies any complaints at the start of the session but states that she was running late due to issues in the bathroom this morning.    Later during session, she states that this happens occasionally and she usually notices an increase in the tone in her B LE when it does.      She was compliant with home exercise program.  Response to previous treatment: good  Functional change: stable    Pain: 0/10  Location: legs bilateral    Objective      Objective Measures updated on 7/31/24.     Flexibility: post TDN + standing frame (8/1)  Ankle Dorsiflexion  Right: lacking 10 deg  Left: lacking 5 deg    Flexibility: post TDN + standing frame (8/6)  Ankle Dorsiflexion  Right: lacking 14 deg  Left: lacking 6 deg    Treatment     Taisha received the treatments listed below:      therapeutic exercises to develop strength, endurance, ROM, and flexibility for 00 minutes including:    NP       neuromuscular re-education activities to improve: Balance, Coordination, and Kinesthetic for 00 minutes. The following activities were included:    NP      therapeutic  activities to improve functional performance for 4 minutes, including:    MWC <> gym mat via scooting mod I    Sit EOM <> supine <> prone mod I      manual therapy techniques: joint Mobilization were applied to the bilateral ankles: for 23 minutes, including:    Bilateral ankle talotibial and talocrural Grade I-IV mobilizations and distractions to facilitate passive dorsiflexion x 60-90 seconds to each leg prior to trigger point dry needling (TDN)    Dry Needling performed by Carolina Mayes, PT, DPT, NCS. The patient was cleared of all contraindications and educated on the risks and effects of dry needling, and post needling expectations. The patient was agreeable to dry needling treatment. Patient signed informed consent form prior to receiving any interventions. Dry Needling was performed using 0.30 mm x 40 mm needles (2) to the below noted muscle groups for 4 minutes (total time needle in muscle). No adverse affects were noted.   - right posterior tibialis (1)  - left posterior tibialis (1)    Soft tissue mobilization provided to muscles above following needling to assist in tissue relaxation       Patient Education and Home Exercises       Education provided:   - HEP, increased use of standing frame, stretching for increased DF, increased time wearing AFOs  - edu re: contraindications based on ankle measurements for ReWalk training    Written Home Exercises Provided: Patient instructed to cont prior HEP. Exercises were reviewed and Taisha was able to demonstrate them prior to the end of the session.  Taisha demonstrated good  understanding of the education provided. See Electronic Medical Record under Patient Instructions for exercises provided during therapy sessions    Assessment     Taisha Nance tolerated today's session well with a focus on dry needling. She showed more of a twitch response with dry needling to her right and left posterior tibialis this afternoon. Once assisted to prone, PT noted increased  fasciculations and non-fatigable clonus in B LE and deferred TDN to her soleus muscles on today for safety. She continues to be appropriate for skilled physical therapy services to improve her bilateral ankle joint ROM.     Taisha Is progressing well towards her goals.   Patient prognosis is Good.     Patient will continue to benefit from skilled outpatient physical therapy to address the deficits listed in the problem list box on initial evaluation, provide pt/family education and to maximize pt's level of independence in the home and community environment.     Patient's spiritual, cultural and educational needs considered and pt agreeable to plan of care and goals.     Anticipated barriers to physical therapy: distance to clinic    Goals: Short Term Goals: 6 weeks   Pt will receive an individualized home exercise program. Met 7/31/24  Pt will improve bilateral ankle ROM by >/= 5 degrees each. Met 7/31/24  Pt will tolerate standing for >/= 10 minutes in standing frame without changes in vitals. Met 7/31/24  Pt will be seen by ReWalk representative to assess her potential to benefit from standing/training with ReWalk at this time. On going      Long Term Goals: 12 weeks   Pt will be independent with an individualized home exercise program. Met 7/31/24   Pt will improve bilateral ankle ROM by >/= 10 degrees each. On going   Pt will tolerate standing for >/= 20 minutes in standing frame or ReWalk device without changes in vitals. On going   Patient will improve her FOTO score from 37%  to at least 44% for improved self perception of functional mobility.(score 0-100, high score indicates greater level of function) on going     Plan     Extend PT POC from 7/26/24 to 8/16/24.    Cont to progress ankle range of motion and standing frame tolerance    Carolina Mayes, PT

## 2024-08-09 ENCOUNTER — CLINICAL SUPPORT (OUTPATIENT)
Dept: REHABILITATION | Facility: HOSPITAL | Age: 64
End: 2024-08-09
Payer: MEDICARE

## 2024-08-09 DIAGNOSIS — M25.672 DECREASED RANGE OF MOTION OF BOTH ANKLES: Primary | ICD-10-CM

## 2024-08-09 DIAGNOSIS — M25.671 DECREASED RANGE OF MOTION OF BOTH ANKLES: Primary | ICD-10-CM

## 2024-08-09 DIAGNOSIS — R26.89 DECREASED FUNCTIONAL MOBILITY: ICD-10-CM

## 2024-08-09 PROCEDURE — 97140 MANUAL THERAPY 1/> REGIONS: CPT | Mod: KX,PO

## 2024-08-13 ENCOUNTER — CLINICAL SUPPORT (OUTPATIENT)
Dept: REHABILITATION | Facility: HOSPITAL | Age: 64
End: 2024-08-13
Payer: MEDICARE

## 2024-08-13 DIAGNOSIS — R26.89 DECREASED FUNCTIONAL MOBILITY: ICD-10-CM

## 2024-08-13 DIAGNOSIS — M25.672 DECREASED RANGE OF MOTION OF BOTH ANKLES: Primary | ICD-10-CM

## 2024-08-13 DIAGNOSIS — M25.671 DECREASED RANGE OF MOTION OF BOTH ANKLES: Primary | ICD-10-CM

## 2024-08-13 PROCEDURE — 97112 NEUROMUSCULAR REEDUCATION: CPT | Mod: KX,PO

## 2024-08-13 PROCEDURE — 97140 MANUAL THERAPY 1/> REGIONS: CPT | Mod: KX,PO

## 2024-08-13 PROCEDURE — 97530 THERAPEUTIC ACTIVITIES: CPT | Mod: KX,PO

## 2024-08-13 NOTE — PROGRESS NOTES
[facility]   Physical Therapy Treatment Note     Name: Taisha Mckoy [facility] Number: [id number]     Therapy Diagnosis:   Encounter Diagnoses   Name Primary?    Decreased range of motion of both ankles Yes    Decreased functional mobility      Physician: Order, Paper    Visit Date: 8/13/2024    Physician Orders: PT Eval and Treat  Medical Diagnosis from Referral: Incomplete paraplegia [G82.22]   Evaluation Date: 4/29/2024  Authorization Period Expiration: 12/31/2024  Plan of Care Expiration: 7/26/2024  Updated Plan of Care Expiration: 8/16/2024  Visit # / Visits authorized: 18/20  Progress Note Due: 7/26/24     Time In: 1305 (pt in bathroom at start of session)  Time Out: 1346  Total Billable Time: 41 minutes     Precautions: Standard    PTA Visit #: 0/5     Subjective     Patient reports: She is feeling okay this afternoon    She was compliant with home exercise program.  Response to previous treatment: good  Functional change: stable    Pain: 0/10  Location: legs bilateral    Objective      Objective Measures updated on 7/31/24.     Flexibility: post TDN + standing frame (8/6)  Ankle Dorsiflexion  Right: lacking 14 deg  Left: lacking 6 deg    Flexibility: post TDN + standing frame (8/13)  Ankle Dorsiflexion  Right: lacking 17 deg  Left: lacking 5 deg    Treatment     Taisha received the treatments listed below:      therapeutic exercises to develop strength, endurance, ROM, and flexibility for 00 minutes including:    NP       neuromuscular re-education activities to improve: Balance, Coordination, and Kinesthetic for 12 minutes. The following activities were included:    While in standing frame (12 min) for core strengthening:   - 2 x 15 chest press with red (2.2#) medicine ball  - 2 x 15 shld press with red med ball  - x 15 rows against red TB  - 2 x 1 min, lateral weight shifting with light B UE support  - x 10 reps mini squats      therapeutic activities to improve functional  performance for 8 minutes, including:    MWC <> gym mat via scooting mod I     Sit EOM <> supine <> prone mod I     Patient set-up in sling-back standing frame x 12 min to work on upright tolerance, provide prolonged stretch to bilateral lower extremities, and perform core/trunk control work (see above in NMR for trunk control interventions)      Time included here to measure bilateral ankle ROM       manual therapy techniques: joint Mobilization were applied to the bilateral ankles: for 21 minutes, including:    Bilateral ankle talotibial and talocrural Grade I-IV mobilizations and distractions to facilitate passive dorsiflexion x 60-90 seconds to each leg prior to trigger point dry needling (TDN)    Dry Needling performed by Carolina Mayes, PT, DPT, NCS. The patient was cleared of all contraindications and educated on the risks and effects of dry needling, and post needling expectations. The patient was agreeable to dry needling treatment. Patient signed informed consent form prior to receiving any interventions. Dry Needling was performed using 0.30 mm x 40 mm needles (4) to the below noted muscle groups for 4 minutes (total time needle in muscle). No adverse affects were noted.   - right posterior tibialis (1)  - left posterior tibialis (1)  - right soleus muscle belly (1)  - left soleus muscle belly (1)    Soft tissue mobilization provided to muscles above following needling to assist in tissue relaxation       Patient Education and Home Exercises       Education provided:   - HEP, increased use of standing frame, stretching for increased DF, increased time wearing AFOs  - edu re: contraindications based on ankle measurements for ReWalk training    Written Home Exercises Provided: Patient instructed to cont prior HEP. Exercises were reviewed and Taisha was able to demonstrate them prior to the end of the session.  Taisha demonstrated good  understanding of the education provided. See Electronic Medical Record under  Patient Instructions for exercises provided during therapy sessions    Assessment     Taisha Nance tolerated today's session well with a focus on dry needling. She continues to respond well to dry needling with similar ROM noted following the standing frame compared to her measurements a week ago. She has been scheduled with orthopedic PT and plans to start this next week to continue her efforts towards improving her ROM so that she may utilize her ReWalk device. She continues to be appropriate for skilled physical therapy services to improve her bilateral ankle joint ROM.     Taisha Is progressing well towards her goals.   Patient prognosis is Good.     Patient will continue to benefit from skilled outpatient physical therapy to address the deficits listed in the problem list box on initial evaluation, provide pt/family education and to maximize pt's level of independence in the home and community environment.     Patient's spiritual, cultural and educational needs considered and pt agreeable to plan of care and goals.     Anticipated barriers to physical therapy: distance to clinic    Goals: Short Term Goals: 6 weeks   Pt will receive an individualized home exercise program. Met 7/31/24  Pt will improve bilateral ankle ROM by >/= 5 degrees each. Met 7/31/24  Pt will tolerate standing for >/= 10 minutes in standing frame without changes in vitals. Met 7/31/24  Pt will be seen by ReWalk representative to assess her potential to benefit from standing/training with ReWalk at this time. On going      Long Term Goals: 12 weeks   Pt will be independent with an individualized home exercise program. Met 7/31/24   Pt will improve bilateral ankle ROM by >/= 10 degrees each. On going   Pt will tolerate standing for >/= 20 minutes in standing frame or ReWalk device without changes in vitals. On going   Patient will improve her FOTO score from 37%  to at least 44% for improved self perception of functional mobility.(score  0-100, high score indicates greater level of function) on going     Plan     Extend PT POC from 7/26/24 to 8/16/24.    Cont to progress ankle range of motion and standing frame tolerance    Carolina Mayes, PT

## 2024-08-15 ENCOUNTER — CLINICAL SUPPORT (OUTPATIENT)
Dept: REHABILITATION | Facility: HOSPITAL | Age: 64
End: 2024-08-15
Payer: MEDICARE

## 2024-08-15 DIAGNOSIS — R26.89 DECREASED FUNCTIONAL MOBILITY: ICD-10-CM

## 2024-08-15 DIAGNOSIS — M25.672 DECREASED RANGE OF MOTION OF BOTH ANKLES: Primary | ICD-10-CM

## 2024-08-15 DIAGNOSIS — M25.671 DECREASED RANGE OF MOTION OF BOTH ANKLES: Primary | ICD-10-CM

## 2024-08-15 PROCEDURE — 97140 MANUAL THERAPY 1/> REGIONS: CPT | Mod: PO

## 2024-08-15 PROCEDURE — 97530 THERAPEUTIC ACTIVITIES: CPT | Mod: PO

## 2024-08-15 PROCEDURE — 97112 NEUROMUSCULAR REEDUCATION: CPT | Mod: PO

## 2024-08-15 NOTE — PROGRESS NOTES
[facility] OUTPATIENT THERAPY AND WELLNESS   Physical Therapy Discharge Summary     Name: Taisha Mckoy [facility] Number: [id number]     Therapy Diagnosis:   Encounter Diagnoses   Name Primary?    Decreased range of motion of both ankles Yes    Decreased functional mobility      Physician: Order, Paper    Visit Date: 8/15/2024    Physician Orders: PT Eval and Treat  Medical Diagnosis from Referral: Incomplete paraplegia [G82.22]   Evaluation Date: 4/29/2024  Authorization Period Expiration: 12/31/2024  Plan of Care Expiration: 7/26/2024  Updated Plan of Care Expiration: 8/16/2024  Visit # / Visits authorized: 21/30  Progress Note Due: 7/26/24     Time In: 1300   Time Out: 1344  Total Billable Time: 44 minutes     Precautions: Standard    PTA Visit #: 0/5     Subjective     Patient reports: She is feeling okay this afternoon. States that she is sad to be finishing her therapy here but she's excited to continue making progress in her ankle ROM with ortho PT.    She was compliant with home exercise program.  Response to previous treatment: good  Functional change: stable    Pain: 0/10  Location: legs bilateral    Objective      Objective Measures updated on 7/31/24.     Flexibility:  (4/29/24)  Ankle Dorsiflexion  Right: -27 deg  Left: -18 deg    Flexibility: prior to TDN + standing frame (8/15/24)  Ankle Dorsiflexion  Right: lacking 19 deg  Left: lacking 11 deg    Flexibility: post TDN + standing frame (8/15/24)  Ankle Dorsiflexion  Right: lacking 11 deg  Left: lacking 3 deg    Treatment     Taisha received the treatments listed below:      therapeutic exercises to develop strength, endurance, ROM, and flexibility for 00 minutes including:    NP       neuromuscular re-education activities to improve: Balance, Coordination, and Kinesthetic for 10 minutes. The following activities were included:    While in standing frame (10 min) for core strengthening:   - 2 x 15 chest press with red (2.2#) medicine ball  - 2 x 15 shld  press with red med ball  - x 20 rows against red TB  - x 1 min, lateral weight shifting with light B UE support  - x 15 reps mini squats      therapeutic activities to improve functional performance for 12 minutes, including:    MWC <> gym mat via scooting mod I     Sit EOM <> supine <> prone mod I     Patient set-up in sling-back standing frame x 10 min to work on upright tolerance, provide prolonged stretch to bilateral lower extremities, and perform core/trunk control work (see above in NMR for trunk control interventions)      Time included here to measure bilateral ankle ROM before and after manual therapy/standing frame      manual therapy techniques: joint Mobilization were applied to the bilateral ankles: for 22 minutes, including:    Bilateral ankle talotibial and talocrural Grade I-IV mobilizations and distractions to facilitate passive dorsiflexion x 60-90 seconds to each leg prior to trigger point dry needling (TDN)    Dry Needling performed by Carolina Mayes, PT, DPT, NCS. The patient was cleared of all contraindications and educated on the risks and effects of dry needling, and post needling expectations. The patient was agreeable to dry needling treatment. Patient signed informed consent form prior to receiving any interventions. Dry Needling was performed using 0.30 mm x 40 mm needles (6) to the below noted muscle groups for 4 minutes (total time needle in muscle). No adverse affects were noted.   - right posterior tibialis (2)  - left posterior tibialis (2)  - right soleus muscle belly (1)  - left soleus muscle belly (1)    Soft tissue mobilization provided to muscles above following needling to assist in tissue relaxation       Patient Education and Home Exercises       Education provided:   - HEP, increased use of standing frame, stretching for increased DF, increased time wearing AFOs  - edu re: contraindications based on ankle measurements for ReWalk training    Written Home Exercises Provided:  Patient instructed to cont prior HEP. Exercises were reviewed and Taisha was able to demonstrate them prior to the end of the session.  Taisha demonstrated good  understanding of the education provided. See Electronic Medical Record under Patient Instructions for exercises provided during therapy sessions    Assessment     Taisha Nance tolerated today's session and was able to participate in a final reassessment prior to discharge from PT. Since starting therapy, she has shown improved ROM in both of her ankles and reported more regular stretching in her standing frame at home. To date, she has met 3/4 short- and 3/4 long-term goals established at her initial evaluation and prior progress notes. 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 Given her subjective and objective improvements in their functional mobility and their progress towards goals over their episode of therapy spanning 4/29/24 - 8/16/24, both she and PT believe they are appropriate for discharge from outpatient Neurologic PT at this time with a plan to transfer to Orthopedic physical therapy to continue working on manual therapy interventions and joint mobilizations to improve her available ROM. She has been instructed to contact her PCP if there is a noticeable decline in their current level of mobility and independence or if they notice any symptoms indicating a worsening of their condition to seek the necessary medical attention and obtain new orders for outpatient Neuro physical therapy.     Taisha Is progressing well towards her goals.   Patient prognosis is Good.     Patient will continue to benefit from skilled outpatient physical therapy to address the deficits listed in the problem list box on initial evaluation, provide pt/family education and to maximize pt's level of  independence in the home and community environment.     Patient's spiritual, cultural and educational needs considered and pt agreeable to plan of care and goals.     Anticipated barriers to physical therapy: distance to clinic    Goals: Short Term Goals: 6 weeks   Pt will receive an individualized home exercise program. Met 7/31/24  Pt will improve bilateral ankle ROM by >/= 5 degrees each. Met 7/31/24  Pt will tolerate standing for >/= 10 minutes in standing frame without changes in vitals. Met 7/31/24  Pt will be seen by ReWalk representative to assess her potential to benefit from standing/training with ReWalk at this time. Not met 8/15/24     Long Term Goals: 12 weeks   Pt will be independent with an individualized home exercise program. Met 7/31/24   Pt will improve bilateral ankle ROM by >/= 10 degrees each. Met 8/15/24  Pt will tolerate standing for >/= 20 minutes in standing frame or ReWalk device without changes in vitals. Met 8/15/24 (per pt report of performance at home)  Patient will improve her FOTO score from 37%  to at least 44% for improved self perception of functional mobility.(score 0-100, high score indicates greater level of function) Not met 8/15/24 (42%)    Plan     Pt is discharged from skilled physical therapy services    Carolina Mayes, PT

## 2024-08-16 NOTE — PLAN OF CARE
[facility]   Physical Therapy Discharge Summary     Name: Taisha Mckoy [facility] Number: [id number]     Therapy Diagnosis:   Encounter Diagnoses   Name Primary?    Decreased range of motion of both ankles Yes    Decreased functional mobility      Physician: Order, Paper    Visit Date: 8/15/2024    Physician Orders: PT Eval and Treat  Medical Diagnosis from Referral: Incomplete paraplegia [G82.22]   Evaluation Date: 4/29/2024  Authorization Period Expiration: 12/31/2024  Plan of Care Expiration: 7/26/2024  Updated Plan of Care Expiration: 8/16/2024  Visit # / Visits authorized: 21/30  Progress Note Due: 7/26/24     Time In: 1300   Time Out: 1344  Total Billable Time: 44 minutes     Precautions: Standard    PTA Visit #: 0/5     Subjective     Patient reports: She is feeling okay this afternoon. States that she is sad to be finishing her therapy here but she's excited to continue making progress in her ankle ROM with ortho PT.    She was compliant with home exercise program.  Response to previous treatment: good  Functional change: stable    Pain: 0/10  Location: legs bilateral    Objective      Objective Measures updated on 7/31/24.     Flexibility:  (4/29/24)  Ankle Dorsiflexion  Right: -27 deg  Left: -18 deg    Flexibility: prior to TDN + standing frame (8/15/24)  Ankle Dorsiflexion  Right: lacking 19 deg  Left: lacking 11 deg    Flexibility: post TDN + standing frame (8/15/24)  Ankle Dorsiflexion  Right: lacking 11 deg  Left: lacking 3 deg    Treatment     Taisha received the treatments listed below:      therapeutic exercises to develop strength, endurance, ROM, and flexibility for 00 minutes including:    NP       neuromuscular re-education activities to improve: Balance, Coordination, and Kinesthetic for 10 minutes. The following activities were included:    While in standing frame (10 min) for core strengthening:   - 2 x 15 chest press with red (2.2#) medicine ball  - 2 x 15 shld  press with red med ball  - x 20 rows against red TB  - x 1 min, lateral weight shifting with light B UE support  - x 15 reps mini squats      therapeutic activities to improve functional performance for 12 minutes, including:    MWC <> gym mat via scooting mod I     Sit EOM <> supine <> prone mod I     Patient set-up in sling-back standing frame x 10 min to work on upright tolerance, provide prolonged stretch to bilateral lower extremities, and perform core/trunk control work (see above in NMR for trunk control interventions)      Time included here to measure bilateral ankle ROM before and after manual therapy/standing frame      manual therapy techniques: joint Mobilization were applied to the bilateral ankles: for 22 minutes, including:    Bilateral ankle talotibial and talocrural Grade I-IV mobilizations and distractions to facilitate passive dorsiflexion x 60-90 seconds to each leg prior to trigger point dry needling (TDN)    Dry Needling performed by Carolina Mayes, PT, DPT, NCS. The patient was cleared of all contraindications and educated on the risks and effects of dry needling, and post needling expectations. The patient was agreeable to dry needling treatment. Patient signed informed consent form prior to receiving any interventions. Dry Needling was performed using 0.30 mm x 40 mm needles (6) to the below noted muscle groups for 4 minutes (total time needle in muscle). No adverse affects were noted.   - right posterior tibialis (2)  - left posterior tibialis (2)  - right soleus muscle belly (1)  - left soleus muscle belly (1)    Soft tissue mobilization provided to muscles above following needling to assist in tissue relaxation       Patient Education and Home Exercises       Education provided:   - HEP, increased use of standing frame, stretching for increased DF, increased time wearing AFOs  - edu re: contraindications based on ankle measurements for ReWalk training    Written Home Exercises Provided:  Patient instructed to cont prior HEP. Exercises were reviewed and Taisha was able to demonstrate them prior to the end of the session.  Taisha demonstrated good  understanding of the education provided. See Electronic Medical Record under Patient Instructions for exercises provided during therapy sessions    Assessment     Taisha Nance tolerated today's session and was able to participate in a final reassessment prior to discharge from PT. Since starting therapy, she has shown improved ROM in both of her ankles and reported more regular stretching in her standing frame at home. To date, she has met 3/4 short- and 3/4 long-term goals established at her initial evaluation and prior progress notes. 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 Given her subjective and objective improvements in their functional mobility and their progress towards goals over their episode of therapy spanning 4/29/24 - 8/16/24, both she and PT believe they are appropriate for discharge from outpatient Neurologic PT at this time with a plan to transfer to Orthopedic physical therapy to continue working on manual therapy interventions and joint mobilizations to improve her available ROM. She has been instructed to contact her PCP if there is a noticeable decline in their current level of mobility and independence or if they notice any symptoms indicating a worsening of their condition to seek the necessary medical attention and obtain new orders for outpatient Neuro physical therapy.     Taisha Is progressing well towards her goals.   Patient prognosis is Good.     Patient will continue to benefit from skilled outpatient physical therapy to address the deficits listed in the problem list box on initial evaluation, provide pt/family education and to maximize pt's level of  independence in the home and community environment.     Patient's spiritual, cultural and educational needs considered and pt agreeable to plan of care and goals.     Anticipated barriers to physical therapy: distance to clinic    Goals: Short Term Goals: 6 weeks   Pt will receive an individualized home exercise program. Met 7/31/24  Pt will improve bilateral ankle ROM by >/= 5 degrees each. Met 7/31/24  Pt will tolerate standing for >/= 10 minutes in standing frame without changes in vitals. Met 7/31/24  Pt will be seen by ReWalk representative to assess her potential to benefit from standing/training with ReWalk at this time. Not met 8/15/24     Long Term Goals: 12 weeks   Pt will be independent with an individualized home exercise program. Met 7/31/24   Pt will improve bilateral ankle ROM by >/= 10 degrees each. Met 8/15/24  Pt will tolerate standing for >/= 20 minutes in standing frame or ReWalk device without changes in vitals. Met 8/15/24 (per pt report of performance at home)  Patient will improve her FOTO score from 37%  to at least 44% for improved self perception of functional mobility.(score 0-100, high score indicates greater level of function) Not met 8/15/24 (42%)    Plan     Pt is discharged from skilled physical therapy services    Carolina Mayes, PT

## 2024-08-19 ENCOUNTER — CLINICAL SUPPORT (OUTPATIENT)
Dept: REHABILITATION | Facility: HOSPITAL | Age: 64
End: 2024-08-19
Payer: MEDICARE

## 2024-08-19 DIAGNOSIS — G82.20 SPINAL PARAPLEGIA: Primary | ICD-10-CM

## 2024-08-19 PROCEDURE — 97164 PT RE-EVAL EST PLAN CARE: CPT | Mod: PO

## 2024-08-19 PROCEDURE — 97140 MANUAL THERAPY 1/> REGIONS: CPT | Mod: PO

## 2024-08-19 PROCEDURE — 97530 THERAPEUTIC ACTIVITIES: CPT | Mod: PO

## 2024-08-19 NOTE — PROGRESS NOTES
[facility]  Physical Therapy Initial Evaluation at [facility]    Date: 8/19/2024   Name: Taisha Nance  Clinic Number: [id number]    Therapy Diagnosis:   Encounter Diagnosis   Name Primary?    Spinal paraplegia Yes     Physician: Order, Paper    Physician Orders: PT Eval and Treat   Medical Diagnosis from Referral:   Diagnosis   G82.22 (ICD-10-CM) - Incomplete paraplegia     Evaluation Date: 8/19/2024  Authorization Period Expiration: 12-  Plan of Care Expiration: 11-  Visit # / Visits authorized: 1/1    Time In: 1230  Time Out: 1330  Total Appointment Time (timed & untimed codes): 55 minutes    Precautions: Standard and Fall    Subjective   Date of onset: > 6 years ago years  History of current condition - Taisha reports: she has been attending PT 2' paraplegia status-post T10-11 transverse myelitis.   She has her own walker, wheelchair, FES bike, KAFO's and FES exoskeleton for her legs. There are parallel bars in the home.  She uses a vibration plate, standing frame, shower chair with sliding board in the home.  She is unable to use the FES exoskeleton for walking as the range of motion to bilateral feet is decreased and she cannot get to neutral dorsiflexion and supination/pronation.   She is transferred to [facility] for mobilization and pre-gaiting supported weightbearingas  activities in prep for use of the exoskeleton device.    She has received rehab at multiple facilities since her diagnosis, including [facility] in [place], [facility] in [place]     Medical History:   No past medical history on file.    Surgical History:   Taisha Nance  has no past surgical history on file.    Medications:   Taisha has a current medication list which includes the following prescription(s): alprazolam, alprazolam, amlodipine, calcium carbonate, fluconazole, fluticasone propionate, hydroxychloroquine, hyoscyamine, hyoscyamine, lactobac no.41/bifidobact no.7,  multivitamin, nitrofurantoin, pantoprazole, temazepam, and venlafaxine.    Allergies:   Review of patient's allergies indicates:   Allergen Reactions    Bactrim [sulfamethoxazole-trimethoprim]      Causes a rash        Imaging,     Social History:  Residence: lives with their family 1-story house  Support available: family  Equipment Used:  as noted in subjective  Equipment Owned (not using): Walkers, Type: walker, Braces / Slings / Splints, Type: KAFOs, and Miscellaneous Adaptive, Type: FES exoskeleton,  a Dynasplint is pending  Prior level of function: independent  Current level of functions: limited 2' paraplegia  Hand Dominance: right.   Work: Retired teacher  Drive: yes.     Pain:  She reports that she is insensate to bilateral legs and hips/pelvis    Patients goals: to have sufficient passive range of motion to bilateral feet to be able to use her exoskeleton device    Objective     Observation: arrives accompanied by her mother.  She is in a wheelchair and is independent with wheelchair management and transfers wheelchair - mat.  Resting position in her wheelchair is with external rotation at hips, feet in tennis shoes, resting in bilateral plantarflexion and supination    Skin integrity was good with (+) capillary refill.  (-) scarring, callus or open areas    Posture: good    Passive Range of Motion:   Ankle Right Left   DF (knee extended)  -23 ' -20'   Plantarflexion hypermobile hypermobile   Supination/inversion - 11 from neutral -13' from neutral   pronation Full passive range of motion  Full passive range of motion       Strength:  No active movement in her ankles and feet    Joint Mobility:   forefeet - passive range of motion and accessory range of motion are within functional limits for positioning in neutral  Midfeet - minimal accessory and passive mobility, unable to approach neutral positioning  Hindfeet - no accessory or passive range of motion at subtalar, very restricted in an inverted  position      Palpation: low tone, muscle definition and bulk    Sensation: insensate from hips down    Cognition:   Oriented X 4  Command following: Follows multistep verbal commands  Fluency: clear/fluent    Posture:   Sitting: good in WC  Standing: not applicable     Functional Mobility:  Bed mobility: modified independence  Supine to sit: independence  Sit to supine: independence  Transfers to bed: independence  Sit to stand:  not applicable   Stand pivot:  not applicable   Wheelchair mobility: independence      Left LE  Right LE   Edema  Present, 2' dependency   Present, 2' dependency                                       Limitation/Restriction for FOTO  Survey - deferred 2' chronicity of deficits           TREATMENT   Treatment Time In: 1250  Treatment Time Out: 1330  Total Treatment time (time-based codes) separate from Evaluation: 30 minutes    Taisha received the following manual therapy techniques: Joint mobilizations, Manual traction, and Soft tissue Mobilization were applied to the: bilateral feet/ankles for 25 minutes, including:    Mobilization, grade 4 to the STJ, midfoot and long axis traction of metatarsals.  Passive range of motion with end range of motion prolonged passive stretch to STJ neutral, neutral supination    Taisha participated in dynamic functional therapeutic activities to improve functional performance for 10  minutes, including:    Reviewed recommended plan of care of left and right foot mobilizations, followed by supported stance in standing frame with WEIGHT SHIFT across ankles and feet for prolonged passive stretch in stance to mimic use of her KAFOs and exoskeleton  Patient was instructed with recording on phone and practice self mobilizations to STJ and proximal metatarsals, seated in her wheelchair  Patient was recommended to wear her AFO's for most of the day, with skin checks for prolonged passive stretch in more neutral positioning  Patient was recommended to position hips in  neutral rotation and feet with support while sitting in wheelchair  She stated understanding and was able to return demonstrate all techniques as instructed      Home Exercises and Patient Education Provided    Education provided:   - see above  - Patient and Family member Verbalized Understanding, Demonstrated Understanding , and Needs Reinforcement.  - Time provided for therapeutic counseling and discussion of current health disposition. All questions answered to satisfaction, within scope of PT practice; voiced no other concerns.     Written Home Exercises Provided: Patient instructed to cont prior HEP.  Exercises were reviewed and Taisha was able to demonstrate them prior to the end of the session.  Taisha demonstrated good  understanding of the education provided.     Assessment   Taisha is a 64 y.o. female referred to outpatient Physical Therapy with a medical diagnosis of paraplegia with bilateral foot deformities which limit her use of assistive devices and bracing. Patient presents with marked bilateral foot supination and plantarflexion,  and she is unable to approximate neutral positioning.  She demonstrated increased passive range of motion and accessory range of motion after manual therapy, and was able to return demonstrate basic self range of motion after education.  She will benefit from skilled PT for manual therapy, pre-stance and potential gait training with exoskeleton.  She may benefit from wheelchair assessment for elevated foot plate and lateral supports to her thighs for more neutral resting positioning.   She was engaged and motivated, her mother was very supportive.   It is anticipated that she will progress towards goals attainment in 4-6 weeks.    Patient prognosis is Good.   Patientt will benefit from skilled outpatient Physical Therapy to address the deficits stated above and in the chart below, provide patient /family education, and to maximize patientt's level of independence.     Plan of  care discussed with patient: Yes  Patient's spiritual, cultural and educational needs considered and patient is agreeable to the plan of care and goals as stated below:     Anticipated Barriers for therapy: chronicity of her condition    Medical Necessity is demonstrated by the following  History  Co-morbidities and personal factors that may impact the plan of care Co-morbidities:   Transverse myelitis induced paraplegia    Personal Factors:   Lacks effective self management program for lower legs and feet     low   Examination  Body Structures and Functions, activity limitations and participation restrictions that may impact the plan of care Body Regions:   lower extremities    Body Systems:    gross symmetry  ROM  transfers  skin integrity  skin color    Participation Restrictions:   none    Activity limitations:   Learning and applying knowledge  no deficits    General Tasks and Commands  no deficits    Communication  no deficits    Mobility  walking  moving around using equipment (WC)    Self care  no deficits    Domestic Life  no deficits    Interactions/Relationships  no deficits    Life Areas  no deficits    Community and Social Life  no deficits         moderate   Clinical Presentation stable and uncomplicated low   Decision Making/ Complexity Score: low     Goals:  Short Term Goals: 2 weeks    Patient to be independent with initial home exercise program of self passive range of motion  Patient to demonstrate improved positioning in her wheelchair and sitting to reinforce range of motion activities in PT    Long Term Goals: 6 weeks    Patient to be independent with advance home exercise program of self range of motion to her ankles and feet  Patient to demonstrate adequate passive range of motion for positioning in the exoskeleton for assisted stance and gaiting    Plan   Plan of care Certification: 8/19/2024 to 11-.    Outpatient Physical Therapy 2 times weekly for 6 weeks to include the following  interventions: Manual Therapy, Self Care, Therapeutic Activities, and Therapeutic Exercise.    Patient may be seen by PTA.       Sakina Giron, PT CLT   08-

## 2024-08-25 PROBLEM — G82.20: Status: ACTIVE | Noted: 2024-08-25

## 2024-08-26 ENCOUNTER — CLINICAL SUPPORT (OUTPATIENT)
Dept: REHABILITATION | Facility: HOSPITAL | Age: 64
End: 2024-08-26
Payer: MEDICARE

## 2024-08-26 DIAGNOSIS — R26.89 DECREASED FUNCTIONAL MOBILITY: ICD-10-CM

## 2024-08-26 DIAGNOSIS — M25.672 DECREASED RANGE OF MOTION OF BOTH ANKLES: Primary | ICD-10-CM

## 2024-08-26 DIAGNOSIS — G82.20 SPINAL PARAPLEGIA: ICD-10-CM

## 2024-08-26 DIAGNOSIS — M25.671 DECREASED RANGE OF MOTION OF BOTH ANKLES: Primary | ICD-10-CM

## 2024-08-26 PROCEDURE — 97530 THERAPEUTIC ACTIVITIES: CPT | Mod: KX,PO,CQ

## 2024-08-26 PROCEDURE — 97140 MANUAL THERAPY 1/> REGIONS: CPT | Mod: KX,PO,CQ

## 2024-08-26 NOTE — PROGRESS NOTES
[facility]  Outpatient Physical Therapy Daily Treatment Note      Name: Taisha Mckoy Goyo  Clinic Number: [id number]  Visit Date: 8/26/2024    Therapy Diagnosis:   Encounter Diagnoses   Name Primary?    Decreased range of motion of both ankles Yes    Decreased functional mobility     Spinal paraplegia        Physician: Order, Paper    Physician Orders: PT Eval and Treat   Medical Diagnosis from Referral:   Diagnosis   G82.22 (ICD-10-CM) - Incomplete paraplegia      Evaluation Date: 8/19/2024  Authorization Period Expiration: 12-  Plan of Care Expiration: 11-  Visit # / Visits authorized: 2/20     Time In: 1130  Time Out: 1225  Total Appointment Time (timed & untimed codes): 55 minutes     Precautions: Standard and Fall    Subjective     The patient presents to therapy without complaint of pain. She has been practicing her home exercise program.     Response to previous treatment: good  Functional change: practicing self mobilizations    Pain: 0/10  Location:  n/a     Objective     Taisha received therapeutic exercises to develop strength, endurance, ROM, flexibility, posture, and core stabilization for 0 minutes including:      Taisha received the following manual therapy techniques: Joint mobilizations, Myofacial release, and Soft tissue Mobilization were applied to the: bilateral feet and ankles for 45 minutes, including instructions for self performance and assistance from father     Joint mobs included  subtalor distraction with dorsiflexion,  calcaneal glides with emphasis on lateral glides ; talocrural mobs, intertarsal glides   General dorsiflexion stretches and education of self mobs        Taisha participated in dynamic functional therapeutic activities to improve functional performance for 10  minutes, including:    Educated on the need to wear her AFOs and stand and often as possible to facilitate improved dorsiflexion         Patient Education  and HEP     She was compliant with home exercise program.    Education provided:   - home exercise program review    Written Home Exercises Provided: Patient instructed to cont prior HEP.  Exercises were reviewed and Taisha was able to demonstrate them prior to the end of the session.  Taisha demonstrated fair  understanding of the education provided.     See EMR under Patient Instructions for exercises provided prior visit.    Assessment     The patient presented to clinic engaged and willing to participate with therapy. She was very willing and eager to learn independent performance of mobs. Patient's family was also engaged and wiling to help. High intensity mobs were performed in an attempt to increase dorsiflexion and improve calcaneal position. She tolerated without incident however joints are very immobile and will require high level of intervention in order to achieve the goal of 0 degrees dorsiflexion. Patient was encouraged to wear AFOs and stand as often as she can as she reports that she has bars in her hallway at home that allow her to stand and walk with assistance.     Pt will continue to benefit from skilled outpatient physical therapy to address the deficits listed in the problem list box on initial evaluation, provide pt/family education and to maximize pt's level of independence in the home and community environment.     Taisha Is progressing well towards her goals.   Pt prognosis is Good.     Pt's spiritual, cultural and educational needs considered and pt agreeable to plan of care and goals.    Anticipated barriers to physical therapy: chronicity of her condition     Goals: Goals:  Short Term Goals: 2 weeks    Patient to be independent with initial home exercise program of self passive range of motion  Patient to demonstrate improved positioning in her wheelchair and sitting to reinforce range of motion activities in PT     Long Term Goals: 6 weeks    Patient to be independent with advance home  exercise program of self range of motion to her ankles and feet  Patient to demonstrate adequate passive range of motion for positioning in the exoskeleton for assisted stance and gaiting    Plan     Continue with the plan of care established per initial evaluation    Plan of care Certification: 8/19/2024 to 11-.     Outpatient Physical Therapy 2 times weekly for 6 week    Karen Conde, PTA

## 2024-08-26 NOTE — PLAN OF CARE
[facility]  Physical Therapy re- Evaluation at [facility], updated plan of care     Date: 8/19/2024   Name: Taisha Nance  Clinic Number: [id number]    Therapy Diagnosis:   Encounter Diagnosis   Name Primary?    Spinal paraplegia Yes     Physician: Order, Paper    Physician Orders: PT Eval and Treat   Medical Diagnosis from Referral:   Diagnosis   G82.22 (ICD-10-CM) - Incomplete paraplegia     Evaluation Date: 8/19/2024  Authorization Period Expiration: 12-  Plan of Care Expiration: 11-  Visit # / Visits authorized: 1/1    Time In: 1230  Time Out: 1330  Total Appointment Time (timed & untimed codes): 55 minutes    Precautions: Standard and Fall    Subjective   Date of onset: > 6 years ago years  History of current condition - Taisha reports: she has been attending PT 2' paraplegia status-post T10-11 transverse myelitis.   She has her own wheelchair, FES bike, KAFO's and FES exoskeleton for her legs. There are parallel bars in the home.  She uses a vibration plate, standing frame, shower chair with sliding board in the home.  She is unable to use the exoskeleton for walking as the range of motion to bilateral feet is decreased and she cannot get to neutral dorsiflexion and supination/pronation.   She is transferred to [facility] for mobilization and pre-gaiting supported weightbear activities in prep for use of the exoskeleton device.    She has received rehab at multiple facilities since her diagnosis, including [facility] in [place], [facility] in [place]     Medical History:   No past medical history on file.    Surgical History:   Taisha Nance  has no past surgical history on file.    Medications:   Taisha has a current medication list which includes the following prescription(s): alprazolam, alprazolam, amlodipine, calcium carbonate, fluconazole, fluticasone propionate, hydroxychloroquine, hyoscyamine, hyoscyamine, lactobac no.41/bifidobact no.7,  multivitamin, nitrofurantoin, pantoprazole, temazepam, and venlafaxine.    Allergies:   Review of patient's allergies indicates:   Allergen Reactions    Bactrim [sulfamethoxazole-trimethoprim]      Causes a rash        Imaging,     Social History:  Residence: lives with their family 1-story house  Support available: family  Equipment Used:    Equipment Owned (not using): Walkers, Type: walker, Braces / Slings / Splints, Type: KAFOs, and Miscellaneous Adaptive, Type: FES exoskeleton,  a Dynasplint is pending  Prior level of function: independent  Current level of functions: limited 2' paraplegia  Hand Dominance: right.   Work: Retired.   Drive: yes.     Pain:  She reports that she is insensate to bilateral legs and hips/pelvis    Patients goals: to have sufficient passive range of motion to bilateral feet to be able to use her exoskeleton device    Objective     Observation: arrives accompanied by her mother.  She is in a wheelchair and is independent with wheelchair management and transfers wheelchair - mat.  Resting position in her wheelchair is with external rotation at hips, feet in tennis shoes, resting in bilateral plantarflexion and supination    Skin integrity was good with (+) capillary refill.  (-) scarring, callus or open areas    Posture: good    Passive Range of Motion:   Ankle Right Left   DF (knee extended)  -23 ' -20'   Plantarflexion hypermobile hypermobile   Supination/inversion - 11 from neutral -13' from neutral   pronation Full passive range of motion  Full passive range of motion       Strength:  No active movement in her ankles and feet    Joint Mobility:   forefeet - passive range of motion and accessory range of motion are within functional limits for positioning in neutral  Midfeet - minimal accessory and passive mobility, unable to approach neutral positioning  Hindfeet - no accessory or passive range of motion at subtalar, very restricted in an inverted position      Palpation: low tone,  muscle definition and bulk    Sensation: insensate from hips down    Cognition:   Oriented X 4  Command following: Follows multistep verbal commands  Fluency: clear/fluent    Posture:   Sitting: good in WC  Standing: not applicable     Functional Mobility:  Bed mobility: modified independence  Supine to sit: independence  Sit to supine: independence  Transfers to bed: independence  Sit to stand:  not applicable   Stand pivot:  not applicable   Wheelchair mobility: independence      Left LE  Right LE   Edema  Present, 2' dependency   Present, 2' dependency                                       Limitation/Restriction for FOTO  Survey - deferred 2' chronicity of deficits           TREATMENT   Treatment Time In: 1300  Treatment Time Out: 1330  Total Treatment time (time-based codes) separate from Evaluation: 30 minutes    Taisha received the following manual therapy techniques: Joint mobilizations, Manual traction, and Soft tissue Mobilization were applied to the: bilateral feet/ankles for 20 minutes, including:    Mobilization, grade 4 to the STJ, midfoot and long axis traction of metatarsals.  Passive range of motion with end range of motion prolonged passive stretch to STJ neutral, neutral supination    Taisha participated in dynamic functional therapeutic activities to improve functional performance for 10  minutes, including:    Reviewed recommended plan of care of left and right foot mobilizations, followed by supported stance in standing frame with WEIGHT SHIFT across ankles and feet for prolonged passive stretch in stance to mimic use of her KAFOs and exoskeleton  Patient was instructed with recording on phone and practice self mobilizations to STJ and proximal metatarsals, seated in her wheelchair  Patient was recommended to wear her AFO's for most of the day, with skin checks for prolonged passive stretch in more neutral positioning  Patient was recommended to position hips in neutral rotation and feet with  support while sitting in wheelchair  She stated understanding and was able to return demonstrate all techniques as instructed      Home Exercises and Patient Education Provided    Education provided:   - see above  - Patient and Family member Verbalized Understanding, Demonstrated Understanding , and Needs Reinforcement.  - Time provided for therapeutic counseling and discussion of current health disposition. All questions answered to satisfaction, within scope of PT practice; voiced no other concerns.     Written Home Exercises Provided: Patient instructed to cont prior HEP.  Exercises were reviewed and Taisha was able to demonstrate them prior to the end of the session.  Taisha demonstrated good  understanding of the education provided.     Assessment   Taisha is a 64 y.o. female referred to outpatient Physical Therapy with a medical diagnosis of paraplegia with bilateral foot deformities which limit her use of assistive devices and bracing. Patient presents with marked bilateral foot supination and plantarflexion,  and she is unable to approximate neutral positioning.  She demonstrated increased passive range of motion and accessory range of motion after manual therapy, and was able to return demonstrate basic self range of motion after education.  She will benefit from skilled PT for manual therapy, pre-stance and potential gait training with exoskeleton.  She may benefit from wheelchair assessment for elevated foot plate and lateral supports to her thighs for more neutral resting positioning.   She was engaged and motivated, her mother was very supportive.   It is anticipated that she will progress towards goals attainment in 4-6 weeks.    Patient prognosis is Good.   Patientt will benefit from skilled outpatient Physical Therapy to address the deficits stated above and in the chart below, provide patient /family education, and to maximize patientt's level of independence.     Plan of care discussed with patient:  Yes  Patient's spiritual, cultural and educational needs considered and patient is agreeable to the plan of care and goals as stated below:     Anticipated Barriers for therapy: chronicity of her condition    Medical Necessity is demonstrated by the following  History  Co-morbidities and personal factors that may impact the plan of care Co-morbidities:   Transverse myelitis induced paraplegia    Personal Factors:   Lacks effective self management program for lower legs and feet     low   Examination  Body Structures and Functions, activity limitations and participation restrictions that may impact the plan of care Body Regions:   lower extremities    Body Systems:    gross symmetry  ROM  transfers  skin integrity  skin color    Participation Restrictions:   none    Activity limitations:   Learning and applying knowledge  no deficits    General Tasks and Commands  no deficits    Communication  no deficits    Mobility  walking  moving around using equipment (WC)    Self care  no deficits    Domestic Life  no deficits    Interactions/Relationships  no deficits    Life Areas  no deficits    Community and Social Life  no deficits         moderate   Clinical Presentation stable and uncomplicated low   Decision Making/ Complexity Score: low     Goals:  Short Term Goals: 2 weeks    Patient to be independent with initial home exercise program of self passive range of motion  Patient to demonstrate improved positioning in her wheelchair and sitting to reinforce range of motion activities in PT    Long Term Goals: 6 weeks    Patient to be independent with advance home exercise program of self range of motion to her ankles and feet  Patient to demonstrate adequate passive range of motion for positioning in the exoskeleton for assisted stance and gaiting    Plan   Plan of care Certification: 8/19/2024 to 11-.    Outpatient Physical Therapy 2 times weekly for 6 weeks to include the following interventions: Manual Therapy,  Self Care, Therapeutic Activities, and Therapeutic Exercise.    Patient may be seen by PTA.       Sakina Giron, PT CLT   08-

## 2024-08-28 ENCOUNTER — CLINICAL SUPPORT (OUTPATIENT)
Dept: REHABILITATION | Facility: HOSPITAL | Age: 64
End: 2024-08-28
Payer: MEDICARE

## 2024-08-28 DIAGNOSIS — M25.672 DECREASED RANGE OF MOTION OF BOTH ANKLES: Primary | ICD-10-CM

## 2024-08-28 DIAGNOSIS — G82.20 SPINAL PARAPLEGIA: ICD-10-CM

## 2024-08-28 DIAGNOSIS — M25.671 DECREASED RANGE OF MOTION OF BOTH ANKLES: Primary | ICD-10-CM

## 2024-08-28 DIAGNOSIS — R26.89 DECREASED FUNCTIONAL MOBILITY: ICD-10-CM

## 2024-08-28 PROCEDURE — 97140 MANUAL THERAPY 1/> REGIONS: CPT | Mod: PO,CQ

## 2024-08-29 ENCOUNTER — DOCUMENTATION ONLY (OUTPATIENT)
Dept: REHABILITATION | Facility: HOSPITAL | Age: 64
End: 2024-08-29
Payer: COMMERCIAL

## 2024-08-29 NOTE — PROGRESS NOTES
[facility]  Outpatient Physical Therapy Daily Treatment Note      Name: Taisha Mckoy Goyo  Clinic Number: [id number]  Visit Date: 8/28/2024    Therapy Diagnosis:   Encounter Diagnoses   Name Primary?    Decreased range of motion of both ankles Yes    Decreased functional mobility     Spinal paraplegia        Physician: Order, Paper    Physician Orders: PT Eval and Treat   Medical Diagnosis from Referral:   Diagnosis   G82.22 (ICD-10-CM) - Incomplete paraplegia      Evaluation Date: 8/19/2024  Authorization Period Expiration: 12-  Plan of Care Expiration: 11-  Visit # / Visits authorized: 3/20     Time In: 1045  Time Out: 1130  Total Appointment Time (timed & untimed codes): 45 minutes     Precautions: Standard and Fall    Subjective     The patient presents to therapy without complaint of pain. She has been practicing her home exercise program.     Response to previous treatment: good  Functional change: practicing self mobilizations    Pain: 0/10  Location:  n/a     Objective     Taisha received therapeutic exercises to develop strength, endurance, ROM, flexibility, posture, and core stabilization for 0 minutes including:      Taisha received the following manual therapy techniques: Joint mobilizations, Myofacial release, and Soft tissue Mobilization were applied to the: bilateral feet and ankles for 45 minutes,      Joint mobs included  subtalor distraction with dorsiflexion,  calcaneal glides with emphasis on lateral glides ; talocrural mobs, intertarsal glides   General dorsiflexion stretches and education of self mobs        Taisha participated in dynamic functional therapeutic activities to improve functional performance for 0  minutes, including:    Educated on the need to wear her AFOs and stand and often as possible to facilitate improved dorsiflexion         Patient Education and HEP     She was compliant with home exercise program.    Education  provided:   - home exercise program review    Written Home Exercises Provided: Patient instructed to cont prior HEP.  Exercises were reviewed and Taisha was able to demonstrate them prior to the end of the session.  Taisha demonstrated fair  understanding of the education provided.     See EMR under Patient Instructions for exercises provided prior visit.    Assessment     The patient presented to clinic engaged and willing to participate with therapy.  Review of self mobilization techniques were reviewed with patient. High intensity mobs were performed in an attempt to increase dorsiflexion and improve calcaneal position. She tolerated without incident however joints are very immobile and will require high level of intervention in order to achieve the goal of 0 degrees dorsiflexion. Patient was encouraged to wear AFOs and stand as often as she can as she reports that she has bars in her hallway at home that allow her to stand and walk with assistance.     Pt will continue to benefit from skilled outpatient physical therapy to address the deficits listed in the problem list box on initial evaluation, provide pt/family education and to maximize pt's level of independence in the home and community environment.     Taisha Is progressing well towards her goals.   Pt prognosis is Good.     Pt's spiritual, cultural and educational needs considered and pt agreeable to plan of care and goals.    Anticipated barriers to physical therapy: chronicity of her condition     Goals: Goals:  Short Term Goals: 2 weeks    Patient to be independent with initial home exercise program of self passive range of motion  Patient to demonstrate improved positioning in her wheelchair and sitting to reinforce range of motion activities in PT     Long Term Goals: 6 weeks    Patient to be independent with advance home exercise program of self range of motion to her ankles and feet  Patient to demonstrate adequate passive range of motion for positioning  in the exoskeleton for assisted stance and gaiting    Plan     Continue with the plan of care established per initial evaluation    Plan of care Certification: 8/19/2024 to 11-.     Outpatient Physical Therapy 2 times weekly for 6 week    Karen Conde PTA

## 2024-09-16 ENCOUNTER — CLINICAL SUPPORT (OUTPATIENT)
Dept: REHABILITATION | Facility: HOSPITAL | Age: 64
End: 2024-09-16
Payer: COMMERCIAL

## 2024-09-16 DIAGNOSIS — M25.671 DECREASED RANGE OF MOTION OF BOTH ANKLES: Primary | ICD-10-CM

## 2024-09-16 DIAGNOSIS — G82.20 SPINAL PARAPLEGIA: ICD-10-CM

## 2024-09-16 DIAGNOSIS — M25.672 DECREASED RANGE OF MOTION OF BOTH ANKLES: Primary | ICD-10-CM

## 2024-09-16 DIAGNOSIS — R26.89 DECREASED FUNCTIONAL MOBILITY: ICD-10-CM

## 2024-09-16 PROCEDURE — 97530 THERAPEUTIC ACTIVITIES: CPT | Mod: PO

## 2024-09-16 PROCEDURE — 97140 MANUAL THERAPY 1/> REGIONS: CPT | Mod: PO

## 2024-09-16 NOTE — PROGRESS NOTES
[facility]  Outpatient Physical Therapy Daily Treatment Note      Name: Taisha Mckoy Goyo  Clinic Number: [id number]  Visit Date: 9/16/2024    Therapy Diagnosis:   Encounter Diagnoses   Name Primary?    Decreased range of motion of both ankles Yes    Decreased functional mobility     Spinal paraplegia        Physician: Order, Paper    Physician Orders: PT Eval and Treat   Medical Diagnosis from Referral:   Diagnosis   G82.22 (ICD-10-CM) - Incomplete paraplegia      Evaluation Date: 8/19/2024  Authorization Period Expiration: 12-  Plan of Care Expiration: 11-  Visit # / Visits authorized: 3/20     Time In: 1045  Time Out: 1130  Total Appointment Time (timed & untimed codes): 45 minutes     Precautions: Standard and Fall    Subjective     She states that she had covid and missed her last few scheduled appointments.  She is accompanied by her mother to PT.     Response to previous treatment: good  Functional change: practicing self mobilizations    Pain: 0/10  Location:  n/a     Objective     Taisha received therapeutic exercises to develop strength, endurance, ROM, flexibility, posture, and core stabilization for 0 minutes including:      Taisha received the following manual therapy techniques: Joint mobilizations, Myofacial release, and Soft tissue Mobilization were applied to the: bilateral feet and ankles for 15 minutes,    Joint mobs included  subtalor distraction with dorsiflexion,  calcaneal glides with emphasis on lateral glides ; talocrural mobs, intertarsal glides   General dorsiflexion stretches and education of self mobs    Taisha participated in dynamic functional therapeutic activities to improve functional performance for 0  minutes, including:    Reviewed recommended wheelchair modifications to be considered, not limited to seat cushion, lateral thigh supports and elevation of foot rest - all with goal of neutral seating postion without hip  external rotation, dorsiflexion or subtalar eversion  RE-Educated on the need to wear her AFOs and stand and often as possible to facilitate improved dorsiflexion   Reviewed recommendation for dynasplint to her right ankle with focus on subtalar joint positioning.   Reviewed email sent to PT from [employer] vendor and responded with patient's permission  Patient with stance balance training:  Stock AFO's donned, patient transferred to standing frame and elevated to ~-15' of vertical.  Her foot and ankle position were good, except mild - moderate supination on the right ankle and foot.   Patient practiced lateral WEIGHT SHIFT, posterior pelvic tilts, verbal cues and manual cues for abdominals activation, left and right truncal side bend.    Patient with poor recruitment and capacity to sustain muscle contraction to her core muscle in supported stance.  She was demonstrated and instructed to practice posterior pelvic tilts and abdominal muscle setting in sitting, with as little support to trunk a safe, inprep for use of [employer]. She stated understanding.  She was requested to bring in her AFO's next visit for inspection.    Next visit - incorporate increased stance time and core strengthening activities to allow for weightbear prolonged passive stretch to her feet and ankles and core strengthening    Patient Education and HEP     She was compliant with home exercise program.    Education provided:   - home exercise program review    Written Home Exercises Provided: Patient instructed to cont prior HEP.  Exercises were reviewed and Taisha was able to demonstrate them prior to the end of the session.  Taisha demonstrated fair  understanding of the education provided.     See EMR under Patient Instructions for exercises provided prior visit.    Assessment     The patient presented to clinic engaged and willing to participate with therapy.  Review of self mobilization techniques were reviewed with patient as she spent too much  effort on her forefoot and not enough to the hindfoot. Mobs were performed in an attempt to increase dorsiflexion and improve calcaneal position bilat. The right>left foot and ankle joints remain very immobile and will require high level of intervention in order to achieve the goal of 0 degrees dorsiflexion and subtalar positioning.  She will  benefit from use of a Dynasplint for low load prolonged passive stretch.  Patient was encouraged to wear AFOs and stand as often as she can as she reports that she has bars in her hallway at home that allow her to stand and walk with assistance.     Pt will continue to benefit from skilled outpatient physical therapy to address the deficits listed in the problem list box on initial evaluation, provide pt/family education and to maximize pt's level of independence in the home and community environment.     Taisha Is progressing well towards her goals.   Pt prognosis is Good.     Pt's spiritual, cultural and educational needs considered and pt agreeable to plan of care and goals.    Anticipated barriers to physical therapy: chronicity of her condition     Goals: Goals:  Short Term Goals: 2 weeks    Patient to be independent with initial home exercise program of self passive range of motion  Patient to demonstrate improved positioning in her wheelchair and sitting to reinforce range of motion activities in PT     Long Term Goals: 6 weeks    Patient to be independent with advance home exercise program of self range of motion to her ankles and feet  Patient to demonstrate adequate passive range of motion for positioning in the exoskeleton for assisted stance and gaiting    Plan     Continue with the plan of care established per initial evaluation    Plan of care Certification: 8/19/2024 to 11-.     Outpatient Physical Therapy 2 times weekly for 6 week    Sakina Giron, PT  CLT         yes

## 2024-09-23 ENCOUNTER — CLINICAL SUPPORT (OUTPATIENT)
Dept: REHABILITATION | Facility: HOSPITAL | Age: 64
End: 2024-09-23
Payer: MEDICARE

## 2024-09-23 DIAGNOSIS — M25.672 DECREASED RANGE OF MOTION OF BOTH ANKLES: Primary | ICD-10-CM

## 2024-09-23 DIAGNOSIS — M25.671 DECREASED RANGE OF MOTION OF BOTH ANKLES: Primary | ICD-10-CM

## 2024-09-23 DIAGNOSIS — G82.20 SPINAL PARAPLEGIA: ICD-10-CM

## 2024-09-23 DIAGNOSIS — R26.89 DECREASED FUNCTIONAL MOBILITY: ICD-10-CM

## 2024-09-23 PROCEDURE — 97530 THERAPEUTIC ACTIVITIES: CPT | Mod: PO

## 2024-09-23 PROCEDURE — 97140 MANUAL THERAPY 1/> REGIONS: CPT | Mod: PO

## 2024-09-23 NOTE — PROGRESS NOTES
[facility]  Outpatient Physical Therapy Daily Treatment Note      Name: Taisha Mckoy Goyo  Clinic Number: [id number]  Visit Date: 9/23/2024    Therapy Diagnosis:   Encounter Diagnoses   Name Primary?    Decreased range of motion of both ankles Yes    Decreased functional mobility     Spinal paraplegia        Physician: Order, Paper    Physician Orders: PT Eval and Treat   Medical Diagnosis from Referral:   Diagnosis   G82.22 (ICD-10-CM) - Incomplete paraplegia      Evaluation Date: 8/19/2024  Authorization Period Expiration: 12-  Plan of Care Expiration: 11-  Visit # / Visits authorized: 6/20     Time In: 1430  Time Out: 1530  Total Appointment Time (timed & untimed codes): 60 minutes     Precautions: Standard and Fall    Subjective     She states that she has been doing her home exercise program and wearing her AFOs' more frequently during the day, but not all day long.  She has not heard from either the [employer] rep or the ReWalk rep regarding the next step with her durable medical equipment.  She is accompanied by her mother to PT.     Response to previous treatment: good  Functional change: practicing self mobilizations    Pain: 0/10  Location:  n/a       Objective     Arrives to PT without AFOs donned.      Taisha received therapeutic exercises to develop strength, endurance, ROM, flexibility, posture, and core stabilization for 0 minutes including:      Taisha received the following manual therapy techniques: Joint mobilizations, Myofacial release, and Soft tissue Mobilization were applied to the: bilateral feet and ankles for 15 minutes,    Joint mobs included  subtalor distraction with dorsiflexion,  calcaneal glides with emphasis on lateral glides ; talocrural mobs, intertarsal glides   General dorsiflexion stretches and education of self mobs    Taisha participated in dynamic functional therapeutic activities to improve functional performance  for 50  minutes, including:    Brought her AFO's from home.  They were donned and fit assessed - good, with good containment of her heel and ankle position.  Appeared to be made at 90 dorsiflexion and neutral STJ position.  They were worn with treatment and skin inspected afterwards - no redness of areas of concern.  RE-Educated on the need to wear her AFOs and stand as often as possible to facilitate improved ankle and foot range of motion and to prep for use of the ReWalk  Patient with stance balance training:  Stock AFO's donned, patient transferred to standing frame and elevated to ~-15' of vertical.  Her foot and ankle position were good, except mild supination on the right ankle and foot.   Patient practiced lateral WEIGHT SHIFT, posterior pelvic tilts, verbal cues and manual cues for abdominals activation, left and right truncal side bend.  Once PT completed, patient in wheelchair with AFO's donned.  Her hips were noted to be in less external rotation in sitting.     Patient with poor recruitment and capacity to sustain muscle contraction to her core muscle in supported stance.  She was demonstrated and instructed to practice posterior pelvic tilts and abdominal muscle setting in sitting, with as little support to trunk as safe, inprep for use of ReWalk. She stated understanding.      Next visit - incorporate increased stance time and core strengthening activities to allow for weightbear prolonged passive stretch to her feet and ankles and core strengthening - ongoing    Patient Education and HEP     She was compliant with home exercise program.    Education provided:   - home exercise program review    Written Home Exercises Provided: Patient instructed to cont prior HEP.  Exercises were reviewed and Taisha was able to demonstrate them prior to the end of the session.  Taisha demonstrated fair  understanding of the education provided.     See EMR under Patient Instructions for exercises provided prior  visit.    Assessment     The patient presented to clinic engaged and willing to participate with therapy.   She need to wear her AFO's for longer duration more frequently during her day.  In sitting, her resting position of her hips was improved with AFOs donned. She will  benefit from use of a [employer] for low load prolonged passive stretch.  It is anticipated that she will be ready to trial the ReWalk soon, given the improved positioning of her ankles and feet since initiation of PT at [facility].     Pt will continue to benefit from skilled outpatient physical therapy to address the deficits listed in the problem list box on initial evaluation, provide pt/family education and to maximize pt's level of independence in the home and community environment.     Taisha Is progressing well towards her goals.   Pt prognosis is Good.     Pt's spiritual, cultural and educational needs considered and pt agreeable to plan of care and goals.    Anticipated barriers to physical therapy: chronicity of her condition     Goals: Goals:  Short Term Goals: 2 weeks    Patient to be independent with initial home exercise program of self passive range of motion  Patient to demonstrate improved positioning in her wheelchair and sitting to reinforce range of motion activities in PT     Long Term Goals: 6 weeks    Patient to be independent with advance home exercise program of self range of motion to her ankles and feet  Patient to demonstrate adequate passive range of motion for positioning in the exoskeleton for assisted stance and gaiting    Plan     Continue with the plan of care established per initial evaluation    Plan of care Certification: 8/19/2024 to 11-.     Outpatient Physical Therapy 2 times weekly for 6 week    Sakina Giron, PT  CLT

## 2024-09-25 ENCOUNTER — CLINICAL SUPPORT (OUTPATIENT)
Dept: REHABILITATION | Facility: HOSPITAL | Age: 64
End: 2024-09-25
Payer: MEDICARE

## 2024-09-25 DIAGNOSIS — G82.20 SPINAL PARAPLEGIA: ICD-10-CM

## 2024-09-25 DIAGNOSIS — M25.672 DECREASED RANGE OF MOTION OF BOTH ANKLES: Primary | ICD-10-CM

## 2024-09-25 DIAGNOSIS — R26.89 DECREASED FUNCTIONAL MOBILITY: ICD-10-CM

## 2024-09-25 DIAGNOSIS — M25.671 DECREASED RANGE OF MOTION OF BOTH ANKLES: Primary | ICD-10-CM

## 2024-09-25 PROCEDURE — 97140 MANUAL THERAPY 1/> REGIONS: CPT | Mod: KX,PO

## 2024-09-25 PROCEDURE — 97530 THERAPEUTIC ACTIVITIES: CPT | Mod: KX,PO

## 2024-09-25 NOTE — PROGRESS NOTES
[facility]  Outpatient Physical Therapy Daily Treatment Note      Name: Taisha Mckoy Goyo  Clinic Number: [id number]  Visit Date: 9/25/2024    Therapy Diagnosis:   Encounter Diagnoses   Name Primary?    Decreased range of motion of both ankles Yes    Decreased functional mobility     Spinal paraplegia        Physician: Order, Paper    Physician Orders: PT Eval and Treat   Medical Diagnosis from Referral:   Diagnosis   G82.22 (ICD-10-CM) - Incomplete paraplegia      Evaluation Date: 8/19/2024  Authorization Period Expiration: 12-  Plan of Care Expiration: 11-  Visit # / Visits authorized: 6/20     Time In: 1430  Time Out: 1530  Total Appointment Time (timed & untimed codes): 60 minutes     Precautions: Standard and Fall    Subjective     She states that she has been doing her home exercise program and wearing her AFOs' more frequently during the day, but not all day long.  She has not heard from either the [employer] rep or the ReWalk rep regarding the next step with her durable medical equipment.  She is accompanied by her mother to PT.     Response to previous treatment: good  Functional change: practicing self mobilizations    Pain: 0/10  Location:  n/a       Objective     Arrives to PT without AFOs donned.      Taisha received therapeutic exercises to develop strength, endurance, ROM, flexibility, posture, and core stabilization for 0 minutes including:      Taisha received the following manual therapy techniques: Joint mobilizations, Myofacial release, and Soft tissue Mobilization were applied to the: bilateral feet and ankles for 15 minutes,    Joint mobs included  subtalor distraction with dorsiflexion,  calcaneal glides with emphasis on lateral glides ; talocrural mobs, intertarsal glides, midfoot PA-AP mobilizations to increased accessory mobility   General dorsiflexion stretches and education of self mobs    Taisha participated in  dynamic functional therapeutic activities to improve functional performance for 50  minutes, including:    Brought her AFO's from home.  They were donned and fit assessed - good, with good containment of her heel and ankle position.  Appeared to be made at 90 dorsiflexion and neutral STJ position.  They were worn with treatment and skin inspected afterwards - no redness of areas of concern.  Patient with stance balance training:  Stock AFO's donned, patient transferred to standing frame and elevated to ~-15' of vertical.  Her foot and ankle position were good, except mild supination on the right ankle and foot.   Patient practiced lateral WEIGHT SHIFT, posterior pelvic tilts, verbal cues and manual cues for abdominals activation, left and right truncal side bend.  Trunk control and pre-stance/gaiting activities with standing frame seat position bw 45' and upright: pelvic tilts with holding x 3 count, multiple sets of 10;  bilateral upper extremity overhead and forward reach x 10 each;, paloff press with blue theraband for abdominal muscle recruitment, left and right x 10 each; resisted trunk rotation with blue theraband left and right x 10.  Rests bw activities with lowered seat.  Patient denied signs and symptoms of intolerance to upright positioning  Skin checked after stance - no redness or irritation       Patient with poor recruitment and capacity to sustain muscle contraction to her core muscle in supported stance.  She was demonstrated and instructed to practice posterior pelvic tilts and abdominal muscle setting in sitting, with as little support to trunk as safe, inprep for use of ReWalk. She stated understanding.      Next visit - incorporate increased stance time and core strengthening activities to allow for weightbear prolonged passive stretch to her feet and ankles and core strengthening - ongoing    Patient Education and HEP     She was compliant with home exercise program.    Education provided:   -  home exercise program review    Written Home Exercises Provided: Patient instructed to cont prior HEP.  Exercises were reviewed and Taisha was able to demonstrate them prior to the end of the session.  Taisha demonstrated fair  understanding of the education provided.     See EMR under Patient Instructions for exercises provided prior visit.    Assessment     The patient presented to clinic engaged and willing to participate with therapy.   She acknowledges improved wheelchair seating positioning and ankle foot range of motion with more consistent use of AFOs. She is more aware of her posture and actively works to hold pelvis in kellee anterior tilted position.  It is anticipated that she will be ready to trial the ReWalk soon, given the improved positioning of her ankles and feet since initiation of PT at [facility].     Pt will continue to benefit from skilled outpatient physical therapy to address the deficits listed in the problem list box on initial evaluation, provide pt/family education and to maximize pt's level of independence in the home and community environment.     Taisha Is progressing well towards her goals.   Pt prognosis is Good.     Pt's spiritual, cultural and educational needs considered and pt agreeable to plan of care and goals.    Anticipated barriers to physical therapy: chronicity of her condition     Goals: Goals:  Short Term Goals: 2 weeks    Patient to be independent with initial home exercise program of self passive range of motion  Patient to demonstrate improved positioning in her wheelchair and sitting to reinforce range of motion activities in PT     Long Term Goals: 6 weeks    Patient to be independent with advance home exercise program of self range of motion to her ankles and feet  Patient to demonstrate adequate passive range of motion for positioning in the exoskeleton for assisted stance and gaiting    Plan     Continue with the plan of care established per initial evaluation    Plan of care  Certification: 8/19/2024 to 11-.     Outpatient Physical Therapy 2 times weekly for 6 week    Sakina Giron PT  CLT

## 2024-09-30 ENCOUNTER — CLINICAL SUPPORT (OUTPATIENT)
Dept: REHABILITATION | Facility: HOSPITAL | Age: 64
End: 2024-09-30
Payer: MEDICARE

## 2024-09-30 DIAGNOSIS — M25.672 DECREASED RANGE OF MOTION OF BOTH ANKLES: Primary | ICD-10-CM

## 2024-09-30 DIAGNOSIS — M25.671 DECREASED RANGE OF MOTION OF BOTH ANKLES: Primary | ICD-10-CM

## 2024-09-30 DIAGNOSIS — G82.20 SPINAL PARAPLEGIA: ICD-10-CM

## 2024-09-30 DIAGNOSIS — R26.89 DECREASED FUNCTIONAL MOBILITY: ICD-10-CM

## 2024-09-30 PROCEDURE — 97530 THERAPEUTIC ACTIVITIES: CPT | Mod: KX,PO

## 2024-09-30 PROCEDURE — 97140 MANUAL THERAPY 1/> REGIONS: CPT | Mod: KX,PO

## 2024-10-01 NOTE — PROGRESS NOTES
[facility]  Outpatient Physical Therapy Daily Treatment Note      Name: Taisha Mckoy Goyo  Clinic Number: [id number]  Visit Date: 9/30/2024    Therapy Diagnosis:   Encounter Diagnoses   Name Primary?    Decreased range of motion of both ankles Yes    Decreased functional mobility     Spinal paraplegia        Physician: Order, Paper    Physician Orders: PT Eval and Treat   Medical Diagnosis from Referral:   Diagnosis   G82.22 (ICD-10-CM) - Incomplete paraplegia      Evaluation Date: 8/19/2024  Authorization Period Expiration: 12-  Plan of Care Expiration: 11-  Visit # / Visits authorized: 6/20     Time In: 1430  Time Out: 1530  Total Appointment Time (timed & untimed codes): 60 minutes     Precautions: Standard and Fall    Subjective     She states that she has been doing her home exercise program and wearing her AFOs' more frequently during the day, but not all day long.  She has not heard from either the [employer] rep or the ReWalk rep regarding the next step with her durable medical equipment.  She is accompanied by her mother to PT.     Response to previous treatment: good  Functional change: practicing self mobilizations    Pain: 0/10  Location:  n/a       Objective     Photos taken in standing frame with AFOs donned:                  Arrives to PT with AFOs donned.      Taisha received therapeutic exercises to develop strength, endurance, ROM, flexibility, posture, and core stabilization for 0 minutes including:      Taisha received the following manual therapy techniques: Joint mobilizations, Myofacial release, and Soft tissue Mobilization were applied to the: bilateral feet and ankles for 15 minutes,    Joint mobs included  subtalor distraction with dorsiflexion,  calcaneal glides with emphasis on lateral glides ; talocrural mobs, intertarsal glides, midfoot PA-AP mobilizations to increased accessory mobility   General dorsiflexion stretches  and education of self mobs    Taisha participated in dynamic functional therapeutic activities to improve functional performance for 50  minutes, including:    Patient was relayed information from vendor that medical clearance needed to be obtained, then the vendor would reach out to her directly with the process to initiate training on the ReWalk.  Patient with stance balance training:   Patient practiced lateral WEIGHT SHIFT, posterior pelvic tilts, verbal cues and manual cues for abdominals activation, left and right truncal side bend.  Trunk control and pre-stance/gaiting activities with standing frame seat position bw 45' and upright: pelvic tilts with holding x 3 count, multiple sets of 10;  bilateral upper extremity overhead and forward reach x 10 each;, paloff press with blue theraband for abdominal muscle recruitment, left and right x 10 each; resisted trunk rotation with blue theraband left and right x 10.  Rests bw activities with lowered seat.  Patient denied signs and symptoms of intolerance to upright positioning  Skin checked after stance - no redness or irritation     Patient continues with poor recruitment and capacity to sustain muscle contraction to her core muscle in supported stance, she is demonstrating improved postural awarenes  and abdominal muscle activation.    Next visit - incorporate increased stance time and core strengthening activities to allow for weightbear prolonged passive stretch to her feet and ankles and core strengthening - ongoing    Patient Education and HEP     She was compliant with home exercise program.    Education provided:   - home exercise program review    Written Home Exercises Provided: Patient instructed to cont prior HEP.  Exercises were reviewed and Taisha was able to demonstrate them prior to the end of the session.  Taisha demonstrated fair  understanding of the education provided.     See EMR under Patient Instructions for exercises provided prior  visit.    Assessment     The patient presented to clinic engaged and willing to participate with therapy.   She has increased her wear time with her AFOs and is increasing her time at home in stance.  Able to passively attain neutral dorsiflexion and STJ positioning.   It is anticipated that she will be ready to trial the ReWalk soon, given the improved positioning of her ankles and feet since initiation of PT at [facility].     Pt will continue to benefit from skilled outpatient physical therapy to address the deficits listed in the problem list box on initial evaluation, provide pt/family education and to maximize pt's level of independence in the home and community environment.     Taisha Is progressing well towards her goals.   Pt prognosis is Good.     Pt's spiritual, cultural and educational needs considered and pt agreeable to plan of care and goals.    Anticipated barriers to physical therapy: chronicity of her condition     Goals: Goals:  Short Term Goals: 2 weeks    Patient to be independent with initial home exercise program of self passive range of motion MET 09-  Patient to demonstrate improved positioning in her wheelchair and sitting to reinforce range of motion activities in PT MET 09-     Long Term Goals: 6 weeks    Patient to be independent with advance home exercise program of self range of motion to her ankles and feet  Patient to demonstrate adequate passive range of motion for positioning in the exoskeleton for assisted stance and gaiting  Updated 09-: patient to be able to assume posterior pelvic tilts while in stance frame and sustain x 30 seconds for supported gaiting.    Plan     Continue with the plan of care established per initial evaluation    Plan of care Certification: 8/19/2024 to 11-.     Outpatient Physical Therapy 2 times weekly for 6 week    Sakina Giron, PT  CLT

## 2024-10-02 ENCOUNTER — CLINICAL SUPPORT (OUTPATIENT)
Dept: REHABILITATION | Facility: HOSPITAL | Age: 64
End: 2024-10-02
Payer: COMMERCIAL

## 2024-10-02 DIAGNOSIS — R26.89 DECREASED FUNCTIONAL MOBILITY: ICD-10-CM

## 2024-10-02 DIAGNOSIS — G82.20 SPINAL PARAPLEGIA: ICD-10-CM

## 2024-10-02 DIAGNOSIS — M25.672 DECREASED RANGE OF MOTION OF BOTH ANKLES: Primary | ICD-10-CM

## 2024-10-02 DIAGNOSIS — M25.671 DECREASED RANGE OF MOTION OF BOTH ANKLES: Primary | ICD-10-CM

## 2024-10-02 PROCEDURE — 97140 MANUAL THERAPY 1/> REGIONS: CPT | Mod: PO

## 2024-10-02 PROCEDURE — 97530 THERAPEUTIC ACTIVITIES: CPT | Mod: PO

## 2024-10-02 NOTE — PROGRESS NOTES
"  [facility]  Outpatient Physical Therapy Daily Treatment Note      Name: Taisha Nance  Clinic Number: [id number]  Visit Date: 10/2/2024    Therapy Diagnosis:   Encounter Diagnoses   Name Primary?    Decreased range of motion of both ankles Yes    Decreased functional mobility     Spinal paraplegia        Physician: Order, Paper    Physician Orders: PT Eval and Treat   Medical Diagnosis from Referral:   Diagnosis   G82.22 (ICD-10-CM) - Incomplete paraplegia      Evaluation Date: 8/19/2024  Authorization Period Expiration: 12-  Plan of Care Expiration: 11-  Visit # / Visits authorized: 8/20     Time In: 1400  Time Out: 1500  Total Appointment Time (timed & untimed codes): 60 minutes     Precautions: Standard and Fall    Subjective     She states that she has continued to practice her trunk control activities at home when up on her KAFOs.   PT relayed correspondence from [employer] rep requesting updated PT referral for "Re-Walk training" and to obtain medical clearance for same.  Rep to reach out to patient directly with instructions and process for initiating training.   Ms. Nance has not yet been contacted by the [employer] rep.     Response to previous treatment: good  Functional change: practicing self mobilizations    Pain: 0/10  Location:  n/a       Objective   09-:  Photos taken in standing frame with AFOs donned:                  Arrives to PT with AFOs donned.      Taisha received therapeutic exercises to develop strength, endurance, ROM, flexibility, posture, and core stabilization for 0 minutes including:      Taisha received the following manual therapy techniques: Joint mobilizations, Myofacial release, and Soft tissue Mobilization were applied to the: bilateral feet and ankles for 15 minutes,    Joint mobs included  subtalor distraction with dorsiflexion,  calcaneal glides with emphasis on lateral glides ; talocrural mobs, " intertarsal glides, midfoot PA-AP mobilizations to increased accessory mobility   General dorsiflexion stretches and education of self mobs    Taisha participated in dynamic functional therapeutic activities to improve functional performance for 50  minutes, including:    Patient with stance balance training:   Patient practiced lateral WEIGHT SHIFT, posterior pelvic tilts, verbal cues and manual cues for abdominals activation, left and right truncal side bend at ~ 45'. 60' and full stance  Trunk control and pre-stance/gaiting activities with standing frame seat position bw 45' and upright: pelvic tilts with holding x 3 count, multiple sets of 10;  bilateral upper extremity overhead and forward reach x 10 each;Rests bw activities with lowered seat.  At 60' - forearms braced on standing frame table top - Cat/cow with holding of posterior pelvic tilts for 3 count x 10, tapping to facilitate abs.  Patient denied signs and symptoms of intolerance to upright positioning  Skin checked after stance - no redness or irritation     NOT completed this date:  paloff press with blue theraband for abdominal muscle recruitment, left and right x 10 each; resisted trunk rotation with blue theraband left and right x 10.      Patient continues with poor recruitment and capacity to sustain muscle contraction to her core muscle in supported stance, she is demonstrating improved postural awarenes  and abdominal muscle activation - it has improved since initial evaluation.    Next visit - incorporate increased stance time and core strengthening activities to allow for weightbear prolonged passive stretch to her feet and ankles and core strengthening - ongoing    Patient Education and HEP     She was compliant with home exercise program.    Education provided:   - home exercise program review    Written Home Exercises Provided: Patient instructed to cont prior HEP.  Exercises were reviewed and Taisha was able to demonstrate them prior to the  end of the session.  Taisha demonstrated fair  understanding of the education provided.     See EMR under Patient Instructions for exercises provided prior visit.    Assessment     The patient presented to clinic engaged and willing to participate with therapy.   She demonstrated improved recruitment and capacity to sustain abdominal muscle contraction, in multiple positions.  This will facilitate improved trunk control with use of the ReWalk. She has increased her wear time with her AFOs and is increasing her time at home in stance and she notes a difference in her posture in the wheelchair and at home with her KAFOs. .  Able to passively attain neutral dorsiflexion and STJ positioning.   It is anticipated that she will be ready to trial the ReWalk soon, given the improved positioning of her ankles and feet since initiation of PT at [facility].   Recommend continuation of PT at this facility as referrals and medical clearances are obtained prior to transfer back to [facility].    Pt will continue to benefit from skilled outpatient physical therapy to address the deficits listed in the problem list box on initial evaluation, provide pt/family education and to maximize pt's level of independence in the home and community environment.     Taisha Is progressing well towards her goals.   Pt prognosis is Good.     Pt's spiritual, cultural and educational needs considered and pt agreeable to plan of care and goals.    Anticipated barriers to physical therapy: chronicity of her condition     Goals: Goals:  Short Term Goals: 2 weeks    Patient to be independent with initial home exercise program of self passive range of motion MET 09-  Patient to demonstrate improved positioning in her wheelchair and sitting to reinforce range of motion activities in PT MET 09-     Long Term Goals: 6 weeks    Patient to be independent with advance home exercise program of self range of motion to her ankles and feet  Patient to  demonstrate adequate passive range of motion for positioning in the exoskeleton for assisted stance and gaiting  Updated 09-: patient to be able to assume posterior pelvic tilts while in stance frame and sustain x 30 seconds for supported gaiting.    Plan     Continue with the plan of care established per initial evaluation    Plan of care Certification: 8/19/2024 to 11-.     Outpatient Physical Therapy 2 times weekly for 6 week    Sakina Giron PT  CLT

## 2024-10-07 ENCOUNTER — CLINICAL SUPPORT (OUTPATIENT)
Dept: REHABILITATION | Facility: HOSPITAL | Age: 64
End: 2024-10-07
Payer: COMMERCIAL

## 2024-10-07 DIAGNOSIS — G82.20 SPINAL PARAPLEGIA: ICD-10-CM

## 2024-10-07 DIAGNOSIS — R26.89 DECREASED FUNCTIONAL MOBILITY: ICD-10-CM

## 2024-10-07 DIAGNOSIS — M25.671 DECREASED RANGE OF MOTION OF BOTH ANKLES: Primary | ICD-10-CM

## 2024-10-07 DIAGNOSIS — M25.672 DECREASED RANGE OF MOTION OF BOTH ANKLES: Primary | ICD-10-CM

## 2024-10-07 PROCEDURE — 97530 THERAPEUTIC ACTIVITIES: CPT | Mod: KX,PO

## 2024-10-07 PROCEDURE — 97140 MANUAL THERAPY 1/> REGIONS: CPT | Mod: KX,PO

## 2024-10-07 NOTE — PROGRESS NOTES
[facility]  Outpatient Physical Therapy Daily Treatment Note      Name: Taisha Nance  Clinic Number: [id number]  Visit Date: 10/7/2024    Therapy Diagnosis:   Encounter Diagnoses   Name Primary?    Decreased range of motion of both ankles Yes    Decreased functional mobility     Spinal paraplegia        Physician: Order, Paper    Physician Orders: PT Eval and Treat   Medical Diagnosis from Referral:   Diagnosis   G82.22 (ICD-10-CM) - Incomplete paraplegia      Evaluation Date: 8/19/2024  Authorization Period Expiration: 12-  Plan of Care Expiration: 11-  Visit # / Visits authorized: 8/20     Time In: 1400  Time Out: 1500  Total Appointment Time (timed & untimed codes): 60 minutes     Precautions: Standard and Fall    Subjective     She states that she has continued to practice her trunk control activities at home when up on her KAFOs.   Ms. Nance has not yet been contacted by the [employer] or the [employer] rep.  She states that she will contact them by phone tomorrow.    Response to previous treatment: good  Functional change: practicing self mobilizations, improving postural awareness and trunk control    Pain: 0/10  Location:  n/a       Objective   09-:  Photos taken in standing frame with AFOs donned:                  Arrives to PT with AFOs donned.      Taisha received therapeutic exercises to develop strength, endurance, ROM, flexibility, posture, and core stabilization for 0 minutes including:      Taisha received the following manual therapy techniques: Joint mobilizations, Myofacial release, and Soft tissue Mobilization were applied to the: bilateral feet and ankles for 15 minutes,    Joint mobs included  subtalor distraction with dorsiflexion,  calcaneal glides with emphasis on lateral glides ; talocrural mobs, intertarsal glides, midfoot PA-AP mobilizations to increased accessory mobility   General dorsiflexion stretches and  education of self mobs    Taisha participated in dynamic functional therapeutic activities to improve functional performance for 50  minutes, including:    Patient with stance balance training:   Patient practiced lateral WEIGHT SHIFT, posterior pelvic tilts, verbal cues and manual cues for abdominals activation, left and right truncal side bend at ~ 45'. 60' and full stance  Trunk control and pre-stance/gaiting activities with standing frame seat position bw 60' and upright: pelvic tilts with holding x 3 count, multiple sets of 10;  bilateral upper extremity overhead and forward reach x 10 each;Rests bw activities with lowered seat.  paloff press with blue theraband for abdominal muscle recruitment, left and right x 10 each; resisted trunk rotation with blue theraband left and right x 10  At 60' - forearms braced on standing frame table top - Cat/cow with holding of posterior pelvic tilts for 3 count x 10, tapping to facilitate abs.  Patient denied signs and symptoms of intolerance to upright positioning  Skin checked after stance - mild redness a lateral right ankle from hardware on AFO - lightened with removal of AFO    NOT completed this date:    Patient continues with poor recruitment and capacity to sustain muscle contraction to her core muscle in supported stance, she is demonstrating improved postural awarenes  and abdominal muscle activation - it has improved since initial evaluation.    Next visit - bolts padding    moleskin to AFO over incorporate increased stance time and core strengthening activities to allow for weightbear prolonged passive stretch to her feet and ankles and core strengthening - ongoing    Patient Education and HEP     She was compliant with home exercise program.    Education provided:   - home exercise program review    Written Home Exercises Provided: Patient instructed to cont prior HEP.  Exercises were reviewed and Taisha was able to demonstrate them prior to the end of the session.   Taisha demonstrated fair  understanding of the education provided.     See EMR under Patient Instructions for exercises provided prior visit.    Assessment     The patient presented to clinic engaged and willing to participate with therapy.   She demonstrated improved recruitment and capacity to sustain abdominal muscle contraction, in multiple positions.  It is anticipated that she will be ready to trial the ReWalk soon, given the improved positioning of her ankles and feet since initiation of PT at [facility].   Recommend continuation of PT at this facility as referrals and medical clearances are obtained prior to transfer back to [facility].    Pt will continue to benefit from skilled outpatient physical therapy to address the deficits listed in the problem list box on initial evaluation, provide pt/family education and to maximize pt's level of independence in the home and community environment.     Taisha Is progressing well towards her goals.   Pt prognosis is Good.     Pt's spiritual, cultural and educational needs considered and pt agreeable to plan of care and goals.    Anticipated barriers to physical therapy: chronicity of her condition     Goals: Goals:  Short Term Goals: 2 weeks    Patient to be independent with initial home exercise program of self passive range of motion MET 09-  Patient to demonstrate improved positioning in her wheelchair and sitting to reinforce range of motion activities in PT MET 09-     Long Term Goals: 6 weeks    Patient to be independent with advance home exercise program of self range of motion to her ankles and feet  Patient to demonstrate adequate passive range of motion for positioning in the exoskeleton for assisted stance and gaiting  Updated 09-: patient to be able to assume posterior pelvic tilts while in stance frame and sustain x 30 seconds for supported gaiting.    Plan     Continue with the plan of care established per initial evaluation    Plan of  care Certification: 8/19/2024 to 11-.     Outpatient Physical Therapy 2 times weekly for 6 week    Sakina Giron PT  CLT

## 2024-10-09 ENCOUNTER — CLINICAL SUPPORT (OUTPATIENT)
Dept: REHABILITATION | Facility: HOSPITAL | Age: 64
End: 2024-10-09
Payer: COMMERCIAL

## 2024-10-09 DIAGNOSIS — M25.672 DECREASED RANGE OF MOTION OF BOTH ANKLES: Primary | ICD-10-CM

## 2024-10-09 DIAGNOSIS — R26.89 DECREASED FUNCTIONAL MOBILITY: ICD-10-CM

## 2024-10-09 DIAGNOSIS — G82.20 SPINAL PARAPLEGIA: ICD-10-CM

## 2024-10-09 DIAGNOSIS — M25.671 DECREASED RANGE OF MOTION OF BOTH ANKLES: Primary | ICD-10-CM

## 2024-10-09 PROCEDURE — 97530 THERAPEUTIC ACTIVITIES: CPT | Mod: KX,PO

## 2024-10-09 PROCEDURE — 97140 MANUAL THERAPY 1/> REGIONS: CPT | Mod: KX,PO

## 2024-10-11 NOTE — PROGRESS NOTES
[facility]  Outpatient Physical Therapy Daily Treatment Note      Name: Taisha Nance  Clinic Number: [id number]  Visit Date: 10/9/2024    Therapy Diagnosis:   Encounter Diagnoses   Name Primary?    Decreased range of motion of both ankles Yes    Decreased functional mobility     Spinal paraplegia        Physician: Justin Harden MD    Physician Orders: PT Eval and Treat   Medical Diagnosis from Referral:   Diagnosis   G82.22 (ICD-10-CM) - Incomplete paraplegia      Evaluation Date: 8/19/2024  Authorization Period Expiration: 12-  Plan of Care Expiration: 11-  Visit # / Visits authorized: 10/20     Time In: 1400  Time Out: 1500  Total Appointment Time (timed & untimed codes): 60 minutes     Precautions: Standard and Fall    Subjective     She states that she has spoken with the Re[employer] rep.  Hold up is due to the computer and support poles being lost when she transitioned from [facility] when the clinic moved locations.  They did not follow her with the exoskeleton to [facility].   He recommended that the dynasplint be put on hold.  She has an appointment next week with the orthotist and also with her physiatrist.  She anticipates getting Botox to her bladder and her calves.    Response to previous treatment: good  Functional change: practicing self mobilizations, improving postural awareness and trunk control    Pain: 0/10  Location:  n/a       Objective   09-:  Photos taken in standing frame with AFOs donned:                  Arrives to PT with AFOs donned.      Taisha received therapeutic exercises to develop strength, endurance, ROM, flexibility, posture, and core stabilization for 0 minutes including:      Taisha received the following manual therapy techniques: Joint mobilizations, Myofacial release, and Soft tissue Mobilization were applied to the: bilateral feet and ankles for 25 minutes,    Joint mobs included  subtalor distraction  with dorsiflexion,  calcaneal glides with emphasis on lateral glides ; talocrural mobs, intertarsal glides, midfoot PA-AP mobilizations to increased accessory mobility   General dorsiflexion stretches and education of self mobilizations    !/4 inch foam circles applied over bolts in her AFOs for pressure relief    Taisha participated in dynamic functional therapeutic activities to improve functional performance for 35  minutes, including:    Patient with stance balance training:   Patient practiced lateral WEIGHT SHIFT, posterior pelvic tilts, verbal cues and manual cues for abdominals activation, left and right truncal side bend at  60' and full stance  Trunk control and pre-stance/gaiting activities with standing frame seat position bw 60' and upright: pelvic tilts with holding x 3 count, multiple sets of 10;  bilateral upper extremity overhead and forward reach x 10 each;Rests bw activities with lowered seat.  paloff press with blue theraband for abdominal muscle recruitment, left and right x 10 each; resisted trunk rotation with blue theraband left and right x 10  Patient denied signs and symptoms of intolerance to upright positioning  Skin checked after stance - mild redness a lateral right ankle from hardware on AFO - lightened with removal of AFO    NOT completed this date:  At 60' - forearms braced on standing frame table top - Cat/cow with holding of posterior pelvic tilts for 3 count x 10, tapping to facilitate abs.    Patient continues with improving  recruitment and capacity to sustain muscle contraction to her core muscle in supported stance, she is demonstrating improved postural awarenes  and abdominal muscle activation - it has improved since initial evaluation.    Next visit - bolts padding    moleskin to AFO over incorporate increased stance time and core strengthening activities to allow for weightbear prolonged passive stretch to her feet and ankles and core strengthening - ongoing    Patient  Education and HEP     She was compliant with home exercise program.    Education provided:   - home exercise program review    Written Home Exercises Provided: Patient instructed to cont prior HEP.  Exercises were reviewed and Taisha was able to demonstrate them prior to the end of the session.  Taisha demonstrated fair  understanding of the education provided.     See EMR under Patient Instructions for exercises provided prior visit.    Assessment     The patient presented to clinic engaged and willing to participate with therapy.   She demonstrated improved recruitment and capacity to sustain abdominal muscle contraction, in multiple positions.  Recommend continuation of PT at this facility as referrals and medical clearances are obtained prior to transfer back to [facility].    Pt will continue to benefit from skilled outpatient physical therapy to address the deficits listed in the problem list box on initial evaluation, provide pt/family education and to maximize pt's level of independence in the home and community environment.     Taisha Is progressing well towards her goals.   Pt prognosis is Good.     Pt's spiritual, cultural and educational needs considered and pt agreeable to plan of care and goals.    Anticipated barriers to physical therapy: chronicity of her condition     Goals: Goals:  Short Term Goals: 2 weeks    Patient to be independent with initial home exercise program of self passive range of motion MET 09-  Patient to demonstrate improved positioning in her wheelchair and sitting to reinforce range of motion activities in PT MET 09-     Long Term Goals: 6 weeks    Patient to be independent with advance home exercise program of self range of motion to her ankles and feet  Patient to demonstrate adequate passive range of motion for positioning in the exoskeleton for assisted stance and gaiting  Updated 09-: patient to be able to assume posterior pelvic tilts while in stance frame  and sustain x 30 seconds for supported gaiting.    Plan     Continue with the plan of care established per initial evaluation    Plan of care Certification: 8/19/2024 to 11-.     Outpatient Physical Therapy 2 times weekly for 6 week    Sakina Giron PT  CLT

## 2024-10-14 ENCOUNTER — CLINICAL SUPPORT (OUTPATIENT)
Dept: REHABILITATION | Facility: HOSPITAL | Age: 64
End: 2024-10-14
Payer: COMMERCIAL

## 2024-10-14 DIAGNOSIS — G82.20 SPINAL PARAPLEGIA: ICD-10-CM

## 2024-10-14 DIAGNOSIS — M25.672 DECREASED RANGE OF MOTION OF BOTH ANKLES: Primary | ICD-10-CM

## 2024-10-14 DIAGNOSIS — M25.671 DECREASED RANGE OF MOTION OF BOTH ANKLES: Primary | ICD-10-CM

## 2024-10-14 DIAGNOSIS — R26.89 DECREASED FUNCTIONAL MOBILITY: ICD-10-CM

## 2024-10-14 PROCEDURE — 97530 THERAPEUTIC ACTIVITIES: CPT | Mod: KX,PO

## 2024-10-14 PROCEDURE — 97140 MANUAL THERAPY 1/> REGIONS: CPT | Mod: KX,PO

## 2024-10-14 NOTE — PROGRESS NOTES
Counts include 234 beds at the [facility]  Outpatient Physical Therapy Daily Treatment Note      Name: Taisha Mckoy [facility]  Clinic Number: [id number]  Visit Date: 10/14/2024    Therapy Diagnosis:   Encounter Diagnoses   Name Primary?    Decreased range of motion of both ankles Yes    Decreased functional mobility     Spinal paraplegia        Physician: Justin Harden MD    Physician Orders: PT Eval and Treat   Medical Diagnosis from Referral:   Diagnosis   G82.22 (ICD-10-CM) - Incomplete paraplegia      Evaluation Date: 8/19/2024  Authorization Period Expiration: 12-  Plan of Care Expiration: 11-  Visit # / Visits authorized: 10/20     Time In: 1400  Time Out: 1500  Total Appointment Time (timed & untimed codes): 60 minutes     Precautions: Standard and Fall    Subjective     She states that she has continued to complete her home exercise program consistently and believes she has improved tolerance with stance.   She has an appointments this week with the orthotist and also with her physiatrist.  She anticipates getting Botox to her bladder and her calves.  She is going to have the orthotist assess the fit of her AFOs and to assess heel and ankle positioning.   Goals is for neutral dorsiflexion and STJ alignment.     Response to previous treatment: good  Functional change: practicing self mobilizations, improving postural awareness and trunk control    Pain: 0/10  Location:  n/a       Objective   09-:  Photos taken in standing frame with AFOs donned:                  Arrives to PT with AFOs donned.      Taisha received therapeutic exercises to develop strength, endurance, ROM, flexibility, posture, and core stabilization for 0 minutes including:      Taisha received the following manual therapy techniques: Joint mobilizations, Myofacial release, and Soft tissue Mobilization were applied to the: bilateral feet and ankles for 25 minutes,    Joint mobs included  subtalor  distraction with dorsiflexion,  calcaneal glides with emphasis on lateral glides ; talocrural mobs, intertarsal glides, midfoot PA-AP mobilizations to increased accessory mobility   General dorsiflexion stretches and education of self mobilizations    !/4 inch foam circles applied over bolts in her AFOs for pressure relief    Taisha participated in dynamic functional therapeutic activities to improve functional performance for 35  minutes, including:    Patient with stance balance training:   Patient practiced lateral WEIGHT SHIFT, posterior pelvic tilts, verbal cues and manual cues for abdominals activation, left and right truncal side bend at  60' and full stance  Trunk control and pre-stance/gaiting activities with standing frame seat position bw 60' and upright: pelvic tilts with holding x 3 count, multiple sets of 10;  bilateral upper extremity overhead and forward reach with 1# hand weights x 10 each;Rests bw activities with lowered seat.  Forearms braced on standing frame table top - Cat/cow with holding of posterior pelvic tilts for 3 count x 10, tapping to facilitate abs.  Resisted trunk extension with abdominal setting from forward flexed position in standing frame, 2 x 10, focus on slow, controlled movement, concentric and eccentric.   Paloff press with blue theraband for abdominal muscle recruitment, left and right x 10 each; resisted trunk rotation with blue theraband left and right x 10  Patient denied signs and symptoms of intolerance to upright positioning  Skin checked after stance - no redness from AFO      Patient continues with improving  recruitment and capacity to sustain muscle contraction to her core muscle in supported stance, she is demonstrating improved postural awarenes  and abdominal muscle activation - it has improved since initial evaluation.    Next visit - bolts padding with foam- completed    Patient Education and HEP     She was compliant with home exercise program.    Education  provided:   - home exercise program review    Written Home Exercises Provided: Patient instructed to cont prior HEP.  Exercises were reviewed and Taisha was able to demonstrate them prior to the end of the session.  Taisha demonstrated fair  understanding of the education provided.     See EMR under Patient Instructions for exercises provided prior visit.    Assessment     The patient is doing well with ankle passive range of motion and trunk strengthening activities in anticipation of reWalk training.  She may benefit from transfer to neuro PT for advancement of training, to include use of FES.  Patient is agreeable, she perfers to continue her treatment at present at this clinic 2' proximity to home, with transfer to [facility] upon clearance for initiation of reWalk use.   She demonstrates improving strength in her abdominals muscle, evidenced by ability to hold setting contraction > 3 seconds x multiple sets without evidence of muscle fatigue.     Pt will continue to benefit from skilled outpatient physical therapy to address the deficits listed in the problem list box on initial evaluation, provide pt/family education and to maximize pt's level of independence in the home and community environment.     Taisha Is progressing well towards her goals.   Pt prognosis is Good.     Pt's spiritual, cultural and educational needs considered and pt agreeable to plan of care and goals.    Anticipated barriers to physical therapy: chronicity of her condition     Goals: Goals:  Short Term Goals: 2 weeks    Patient to be independent with initial home exercise program of self passive range of motion MET 09-  Patient to demonstrate improved positioning in her wheelchair and sitting to reinforce range of motion activities in PT MET 09-     Long Term Goals: 6 weeks    Patient to be independent with advance home exercise program of self range of motion to her ankles and feet, ongoing, progressing  Patient to demonstrate  adequate passive range of motion for positioning in the exoskeleton for assisted stance and gaiting ongoing progressing  Updated 09-: patient to be able to assume posterior pelvic tilts while in stance frame and sustain x 30 seconds for supported gaiting.    Plan     Continue with the plan of care established per initial evaluation.  Recommend transfer to neuro PT for advancing her therapeutic exercise to include FES below the level of her transverse myelitis.  Ms. Nance is in agreement with update recommendations.  PLAN OF CARE and therapy goals to be updated upon transfer.     Plan of care Certification: 8/19/2024 to 11-.     Outpatient Physical Therapy 2 times weekly for 6 week    Sakina Giron, PT  CLT

## 2024-10-21 ENCOUNTER — CLINICAL SUPPORT (OUTPATIENT)
Dept: REHABILITATION | Facility: HOSPITAL | Age: 64
End: 2024-10-21
Payer: MEDICARE

## 2024-10-21 DIAGNOSIS — M25.671 DECREASED RANGE OF MOTION OF BOTH ANKLES: Primary | ICD-10-CM

## 2024-10-21 DIAGNOSIS — R26.89 DECREASED FUNCTIONAL MOBILITY: ICD-10-CM

## 2024-10-21 DIAGNOSIS — M25.672 DECREASED RANGE OF MOTION OF BOTH ANKLES: Primary | ICD-10-CM

## 2024-10-21 DIAGNOSIS — G82.20 SPINAL PARAPLEGIA: ICD-10-CM

## 2024-10-21 PROCEDURE — 97530 THERAPEUTIC ACTIVITIES: CPT | Mod: KX,PO

## 2024-10-21 PROCEDURE — 97140 MANUAL THERAPY 1/> REGIONS: CPT | Mod: KX,PO

## 2024-10-21 NOTE — PROGRESS NOTES
[facility]  Outpatient Physical Therapy Daily Treatment Note      Name: Taisha Mckoy Goyo  Clinic Number: [id number]  Visit Date: 10/21/2024    Therapy Diagnosis:   Encounter Diagnoses   Name Primary?    Decreased range of motion of both ankles Yes    Decreased functional mobility     Spinal paraplegia        Physician: Justin Harden MD    Physician Orders: PT Eval and Treat   Medical Diagnosis from Referral:   Diagnosis   G82.22 (ICD-10-CM) - Incomplete paraplegia      Evaluation Date: 8/19/2024  Authorization Period Expiration: 12-  Plan of Care Expiration: 11-  Visit # / Visits authorized: 12/20     Time In: 1400  Time Out: 1500  Total Appointment Time (timed & untimed codes): 60 minutes     Precautions: Standard and Fall    Subjective     She states that she has not yet contacted the wheelchair rep.  She notes that she has a newer wheelchair cushion, that is higher than her current one, she does not use it as the cover is torn.   She was requested to contact the rep ASAP for a new cover and to bring the seat in to her next visit.  It was noted by the PT that she needs to be in the newer cushion prior to any adjustments can be made on her wheelchair lower extremities and foot components.   She acknowledged understanding and put a reminder on her phone.     Response to previous treatment: good  Functional change: practicing self mobilizations, improving postural awareness and trunk control    Pain: 0/10  Location:  n/a       Objective   09-:  Photos taken in standing frame with AFOs donned:                  Arrives to PT with AFOs donned.      Taisha received therapeutic exercises to develop strength, endurance, ROM, flexibility, posture, and core stabilization for 0 minutes including:      Taisha received the following manual therapy techniques: Joint mobilizations, Myofacial release, and Soft tissue Mobilization were applied to the:  bilateral feet and ankles for 15 minutes,    Joint mobs included  subtalor distraction with dorsiflexion,  calcaneal glides with emphasis on lateral glides ; talocrural mobs, intertarsal glides, midfoot PA-AP mobilizations to increased accessory mobility   General dorsiflexion stretches and education of self mobilizations    !/4 inch foam circles applied over bolts in her AFOs for pressure relief    Taisha participated in dynamic functional therapeutic activities to improve functional performance for 45  minutes, including:    Patient with stance balance training:   Patient practiced lateral WEIGHT SHIFT, posterior pelvic tilts, verbal cues and manual cues for abdominals activation, left and right truncal side bend at  60' and full stance  Trunk control and pre-stance/gaiting activities with standing frame seat position bw 60' and upright: pelvic tilts with holding x 3 count, multiple sets of 10;  bilateral upper extremity overhead and forward reach with 1# hand weights x 10 each; Rests bw activities with lowered seat.  Resisted trunk extension with abdominal setting from forward flexed position in standing frame, silver theraband, 3 x 10, focus on slow, controlled movement, concentric and eccentric.   Resisted trunk left and right side bend  with abdominal setting from forward flexed position in standing frame, silver theraband, 3 x 10, focus on slow, controlled movement, concentric and eccentric.   Paloff press with blue theraband for abdominal muscle recruitment, left and right x 10 each; resisted trunk rotation with blue theraband left and right x 10  Patient denied signs and symptoms of intolerance to upright positioning  Skin checked after stance - no redness from AFO      Patient continues with improving  recruitment and capacity to sustain muscle contraction to her core muscle in supported stance, she is demonstrating improved postural awarenes  and abdominal muscle activation - it has improved since initial  evaluation.      Patient Education and HEP     She was compliant with home exercise program.    Education provided:   - home exercise program review    Written Home Exercises Provided: Patient instructed to cont prior HEP.  Exercises were reviewed and Taisha was able to demonstrate them prior to the end of the session.  Taisha demonstrated fair  understanding of the education provided.     See EMR under Patient Instructions for exercises provided prior visit.    Assessment     The patient is doing well with ankle passive range of motion and trunk strengthening activities in anticipation of reWalk training.  She needs to pursue obtaining a new cover for her wheelchair cushion and use the new cushion prior to wheelchair adjustments being made.  Recommended that patient continue with PT at this facility, and collaboration with the neuro team for the progression of her treatment.   She demonstrates improving strength in her abdominals muscle, evidenced by ability to hold setting contraction > 3 seconds x multiple sets without evidence of muscle fatigue.     Pt will continue to benefit from skilled outpatient physical therapy to address the deficits listed in the problem list box on initial evaluation, provide pt/family education and to maximize pt's level of independence in the home and community environment.     Taisha Is progressing well towards her goals.   Pt prognosis is Good.     Pt's spiritual, cultural and educational needs considered and pt agreeable to plan of care and goals.    Anticipated barriers to physical therapy: chronicity of her condition     Goals: Goals:  Short Term Goals: 2 weeks    Patient to be independent with initial home exercise program of self passive range of motion MET 09-  Patient to demonstrate improved positioning in her wheelchair and sitting to reinforce range of motion activities in PT MET 09-     Long Term Goals: 6 weeks    Patient to be independent with advance home  exercise program of self range of motion to her ankles and feet, ongoing, progressing  Patient to demonstrate adequate passive range of motion for positioning in the exoskeleton for assisted stance and gaiting ongoing progressing  Updated 09-: patient to be able to assume posterior pelvic tilts while in stance frame and sustain x 30 seconds for supported gaiting.    Plan     Continue with the plan of care established per initial evaluation.  Recommend transfer to neuro PT for advancing her therapeutic exercise to include FES below the level of her transverse myelitis.  Ms. Nance is in agreement with update recommendations.  PLAN OF CARE and therapy goals to be updated upon transfer.     Plan of care Certification: 8/19/2024 to 11-.     Outpatient Physical Therapy 2 times weekly for 6 week    Sakina Giron, PT  CLT

## 2024-10-24 ENCOUNTER — CLINICAL SUPPORT (OUTPATIENT)
Dept: REHABILITATION | Facility: HOSPITAL | Age: 64
End: 2024-10-24
Payer: MEDICARE

## 2024-10-24 DIAGNOSIS — R26.89 DECREASED FUNCTIONAL MOBILITY: ICD-10-CM

## 2024-10-24 DIAGNOSIS — M25.671 DECREASED RANGE OF MOTION OF BOTH ANKLES: Primary | ICD-10-CM

## 2024-10-24 DIAGNOSIS — M25.672 DECREASED RANGE OF MOTION OF BOTH ANKLES: Primary | ICD-10-CM

## 2024-10-24 DIAGNOSIS — G82.20 SPINAL PARAPLEGIA: ICD-10-CM

## 2024-10-24 PROCEDURE — 97530 THERAPEUTIC ACTIVITIES: CPT | Mod: KX,PO

## 2024-10-29 ENCOUNTER — CLINICAL SUPPORT (OUTPATIENT)
Dept: REHABILITATION | Facility: HOSPITAL | Age: 64
End: 2024-10-29
Payer: MEDICARE

## 2024-10-29 DIAGNOSIS — R26.89 DECREASED FUNCTIONAL MOBILITY: ICD-10-CM

## 2024-10-29 DIAGNOSIS — G82.20 SPINAL PARAPLEGIA: ICD-10-CM

## 2024-10-29 DIAGNOSIS — M25.671 DECREASED RANGE OF MOTION OF BOTH ANKLES: Primary | ICD-10-CM

## 2024-10-29 DIAGNOSIS — M25.672 DECREASED RANGE OF MOTION OF BOTH ANKLES: Primary | ICD-10-CM

## 2024-10-29 PROCEDURE — 97032 APPL MODALITY 1+ESTIM EA 15: CPT | Mod: KX,PO

## 2024-11-04 NOTE — PROGRESS NOTES
"[facility]  Outpatient Physical Therapy Daily Treatment Note      Name: Taisha Mckoy [facility]  Clinic Number: [id number]  Visit Date: 11/5/2024    Therapy Diagnosis:   Encounter Diagnoses   Name Primary?    Decreased functional mobility Yes    Spinal paraplegia          Physician: Justin Harden MD    Physician Orders: PT Eval and Treat   Medical Diagnosis from Referral:   Diagnosis   G82.22 (ICD-10-CM) - Incomplete paraplegia      Evaluation Date: 8/19/2024  Authorization Period Expiration: 12-  Plan of Care Expiration: 11-  Visit # / Visits authorized: 15/30 (16)      Time In: 11:02 am    Time Out: 11:45 am    Total Appointment Time (timed & untimed codes): 43  minutes     Precautions: Standard and Fall    Subjective     Patient arrived to therapy today with her mother. Arrived with bilateral KAFOs.   " I am just dragging today." Pt reports her personal ReWalk is at the [facility]. Wearing AFOs full time for ankle ROM       Response to previous treatment: good  Functional change: improving postural awareness and trunk control    Pain: 0/10  Location:  n/a       Objective     therapeutic activities to improve functional performance for 43  minutes, including:    WC<--> mat transfer mod I squat pivot    Pt and therapist assist to doff AFOS and don KAFOS.     Adjusted josefa walker arm supports to fit pt appropriately    Sit to stands x 4 trials josefa walker max a x 1-2 for upright posture and assist locking bilateral KAFOS in extension. Once standing, pt required min A x 2 person for upright posture. Cues to activate core. Pt able to stand 3 min 15 sec, 2 min 30 sec and 2 min 25 seconds with rest breaks in between    Pt ambulated 20 ft with josefa walker with min A x 2 person assist with third person for WC follow.      (Therapeutic rest breaks throughout as needed).       Pt and therapist assist to doff KAFOs and don AFOs.     Patient Education and HEP     She was " compliant with home exercise program.    Education provided:     - educated about standing and walking with  josefa walker. Reports understanding.     Written Home Exercises Provided: Patient instructed to cont prior HEP.  Exercises were reviewed and Taisha was able to demonstrate them prior to the end of the session.  Taisha demonstrated fair  understanding of the education provided.     See EMR under Patient Instructions for exercises provided prior visit.    Assessment     Pt tolerated session well. Working on static stand with josefa walker for core strengthening, upper extremity stability as well as weight bearing through bilateral lower extremity .   Pt able to walk 20 ft with josefa walker min A x 2 person  once standing with third person with WC follow. Pt remains motivated to improve and a good candidate for PT.       She  will continue to benefit from skilled outpatient physical therapy to address the deficits listed in the problem list box on initial evaluation, provide pt/family education and to maximize pt's level of independence in the home and community environment.     Taisha Is progressing well towards her goals.   Pt prognosis is Good.     Pt's spiritual, cultural and educational needs considered and pt agreeable to plan of care and goals.    Anticipated barriers to physical therapy: chronicity of her condition     Goals:   Short Term Goals: 2 weeks    Patient to be independent with initial home exercise program of self passive range of motion MET 09-  Patient to demonstrate improved positioning in her wheelchair and sitting to reinforce range of motion activities in PT MET 09-     Long Term Goals: 6 weeks    Patient to be independent with advance home exercise program of self range of motion to her ankles and feet, ongoing, progressing  Patient to demonstrate adequate passive range of motion for positioning in the exoskeleton for assisted stance and gaiting ongoing progressing  Updated 09-:  patient to be able to assume posterior pelvic tilts while in stance frame and sustain x 30 seconds for supported gaiting.    Plan     Plan of care Certification: 8/19/2024 to 11-.     Outpatient Physical Therapy 2 times weekly for 6 week    Recommendations for next treatment session: continue with use of FES cycle with addition of abdominal stimulation (adjusting intervals as needed); core strengthening; training in KAFOs.     Roselia Ludwig, PT

## 2024-11-05 ENCOUNTER — CLINICAL SUPPORT (OUTPATIENT)
Dept: REHABILITATION | Facility: HOSPITAL | Age: 64
End: 2024-11-05
Payer: COMMERCIAL

## 2024-11-05 DIAGNOSIS — G82.20 SPINAL PARAPLEGIA: ICD-10-CM

## 2024-11-05 DIAGNOSIS — R26.89 DECREASED FUNCTIONAL MOBILITY: Primary | ICD-10-CM

## 2024-11-05 PROCEDURE — 97530 THERAPEUTIC ACTIVITIES: CPT | Mod: KX,PO

## 2024-11-08 ENCOUNTER — CLINICAL SUPPORT (OUTPATIENT)
Dept: REHABILITATION | Facility: HOSPITAL | Age: 64
End: 2024-11-08
Payer: COMMERCIAL

## 2024-11-08 DIAGNOSIS — G82.20 SPINAL PARAPLEGIA: ICD-10-CM

## 2024-11-08 DIAGNOSIS — R26.89 DECREASED FUNCTIONAL MOBILITY: Primary | ICD-10-CM

## 2024-11-08 PROCEDURE — 97530 THERAPEUTIC ACTIVITIES: CPT | Mod: KX,PO

## 2024-11-08 NOTE — PROGRESS NOTES
[facility]  Outpatient Physical Therapy Daily Treatment Note      Name: Taisha Nance  Clinic Number: [id number]  Visit Date: 11/8/2024    Therapy Diagnosis:   Encounter Diagnoses   Name Primary?    Decreased functional mobility Yes    Spinal paraplegia        Physician: Justin Harden MD    Physician Orders: PT Eval and Treat   Medical Diagnosis from Referral:   Diagnosis   G82.22 (ICD-10-CM) - Incomplete paraplegia      Evaluation Date: 8/19/2024  Authorization Period Expiration: 12-  Plan of Care Expiration: 11-  Visit # / Visits authorized: 15/30 (16)      Time In:  2:35 pm ( late arrival to PT)   Time Out: 3:15 pm     Total Appointment Time (timed & untimed codes): 40 minutes     Precautions: Standard and Fall    Subjective        Patient arrived to therapy today with her mother. Arrived with bilateral KAFOs.   Unable to perform standing exercises today as no tech is present in clinic for safety.       Response to previous treatment: good  Functional change: improving postural awareness and trunk control    Pain: 0/10    Location:  n/a       Objective     therapeutic activities to improve functional performance for 40   minutes, including:    - WC < --> mat transfer mod I   - Sit to supine mod I   - supine to prone supervision   - Prone to quadruped min A with mom blocking bilateral legs.   - Prone to supine supervision     Quadruped   - cat/ cow 3x10   Quadruped to elbows and back x 15 reps  - prone partial pushups with knees blocked x 30    Sitting edge of mat:    - Seated trunk rotation with 3 kg weighted ball 3x10 each way  - seated overhead lift with 3 kg weighted ball 3x10   - seated chest press 3kg weighted ball 3x10   - Bicep curls 5 kg weighted  ball 3x10  - diagonals 3kg weighted ball 3x10 each way    Supine double knee to chest with therapist assist holding legs on theraball 1x10 AAROM  Lateral trunk rotation mod A left and right for leg control         (Therapeutic rest breaks throughout as needed).       Pt and therapist assist to chio FEDEDominick and don AFOs.     Patient Education and HEP     She was compliant with home exercise program.    Education provided:     - educated about prone and quadruped. Reports understanding.     Written Home Exercises Provided: Patient instructed to cont prior HEP.  Exercises were reviewed and Taisha was able to demonstrate them prior to the end of the session.  Taisha demonstrated fair  understanding of the education provided.     See EMR under Patient Instructions for exercises provided prior visit.    Assessment        Pt tolerated session well. Unable to perform standing exercises today with no tech present in clinic. Working on core strength and upper extremity strength in sitting with weighted ball exercises. Pt also able to perform exercises in quadruped and prone pushups today with therapist blocking legs. Pt remains a good candidate for PT      She  will continue to benefit from skilled outpatient physical therapy to address the deficits listed in the problem list box on initial evaluation, provide pt/family education and to maximize pt's level of independence in the home and community environment.     Taisha Is progressing well towards her goals.   Pt prognosis is Good.     Pt's spiritual, cultural and educational needs considered and pt agreeable to plan of care and goals.    Anticipated barriers to physical therapy: chronicity of her condition     Goals:   Short Term Goals: 2 weeks    Patient to be independent with initial home exercise program of self passive range of motion MET 09-  Patient to demonstrate improved positioning in her wheelchair and sitting to reinforce range of motion activities in PT MET 09-     Long Term Goals: 6 weeks    Patient to be independent with advance home exercise program of self range of motion to her ankles and feet, ongoing, progressing  Patient to demonstrate adequate passive range  of motion for positioning in the exoskeleton for assisted stance and gaiting ongoing progressing  Updated 09-: patient to be able to assume posterior pelvic tilts while in stance frame and sustain x 30 seconds for supported gaiting.    Plan     Plan of care Certification: 8/19/2024 to 11-.     Outpatient Physical Therapy 2 times weekly for 6 week    Recommendations for next treatment session: continue with use of     FES cycle with addition of abdominal stimulation (adjusting intervals as needed); core strengthening; training in KAFOs.     Roselia Ludwig, PT

## 2024-11-12 ENCOUNTER — CLINICAL SUPPORT (OUTPATIENT)
Dept: REHABILITATION | Facility: HOSPITAL | Age: 64
End: 2024-11-12
Payer: COMMERCIAL

## 2024-11-12 DIAGNOSIS — G82.20 SPINAL PARAPLEGIA: ICD-10-CM

## 2024-11-12 DIAGNOSIS — R26.89 DECREASED FUNCTIONAL MOBILITY: Primary | ICD-10-CM

## 2024-11-12 PROCEDURE — 97530 THERAPEUTIC ACTIVITIES: CPT | Mod: PO

## 2024-11-12 NOTE — PROGRESS NOTES
"[facility]  Outpatient Physical Therapy Daily Treatment Note      Name: Taisha Nance  Clinic Number: [id number]  Visit Date: 11/12/2024    Therapy Diagnosis:   Encounter Diagnoses   Name Primary?    Decreased functional mobility Yes    Spinal paraplegia        Physician: Justin Harden MD    Physician Orders: PT Eval and Treat   Medical Diagnosis from Referral:   Diagnosis   G82.22 (ICD-10-CM) - Incomplete paraplegia      Evaluation Date: 8/19/2024  Authorization Period Expiration: 12-  Plan of Care Expiration: 11-  Visit # / Visits authorized: 15/30 (16)      Time In:   1:47 pm   Time Out:  2:30 pm     Total Appointment Time (timed & untimed codes): 43  minutes     Precautions: Standard and Fall    Subjective       Patient arrived to therapy today with her mother. Arrived with bilateral KAFOs.   " I am doing good."     Response to previous treatment: good  Functional change: improving postural awareness and trunk control    Pain: 0/10    Location:  n/a       Objective     therapeutic activities to improve functional performance for 43  minutes, including:    Doffed bilateral AFOs  Donned bilateral KAFOs with mod A needed.       - WC < --> mat transfer mod I     -  Sit to stands x 3 trials josefa walker max a x 1-2 for upright posture and assist locking bilateral KAFOS in extension. Once standing, pt required min A x 2 person for upright posture. Cues to activate core. Pt able to stand 5 min and 3 min 3 seconds.     Pt ambulated 32 and 40 ft with josefa walker with min/ mod a x 2 with third person with WC follow. Assist needed to unlock KAFOs when needing rest breaks.          (Therapeutic rest breaks throughout as needed).       Pt and therapist assist to doff KAFOs and don AFOs.     Patient Education and HEP     She was compliant with home exercise program.    Education provided:     - educated about Lite Gait training and seeing if pt is interested on December 10th. Pt " reports she is interested and will look at calendar.     Written Home Exercises Provided: Patient instructed to cont prior HEP.  Exercises were reviewed and Taisha was able to demonstrate them prior to the end of the session.  Taisha demonstrated fair  understanding of the education provided.     See EMR under Patient Instructions for exercises provided prior visit.    Assessment        Pt tolerated session well. Pt able to stand for 5 min and 3 min 30 sec which is much longer than previous trial with josefa walker. Pt also able to ambulate 32 ft and 40 ft with josefa walker min/ mod A x 2 person with third person with WC follow. Pt remains motivated to improve and a good candidate for PT.       She  will continue to benefit from skilled outpatient physical therapy to address the deficits listed in the problem list box on initial evaluation, provide pt/family education and to maximize pt's level of independence in the home and community environment.     Taisha Is progressing well towards her goals.   Pt prognosis is Good.     Pt's spiritual, cultural and educational needs considered and pt agreeable to plan of care and goals.    Anticipated barriers to physical therapy: chronicity of her condition     Goals:   Short Term Goals: 2 weeks    Patient to be independent with initial home exercise program of self passive range of motion MET 09-  Patient to demonstrate improved positioning in her wheelchair and sitting to reinforce range of motion activities in PT MET 09-     Long Term Goals: 6 weeks    Patient to be independent with advance home exercise program of self range of motion to her ankles and feet, ongoing, progressing  Patient to demonstrate adequate passive range of motion for positioning in the exoskeleton for assisted stance and gaiting ongoing progressing  Updated 09-: patient to be able to assume posterior pelvic tilts while in stance frame and sustain x 30 seconds for supported gaiting.    Plan      Plan of care Certification: 8/19/2024 to 11-.     Outpatient Physical Therapy 2 times weekly for 6 week    Recommendations for next treatment session: continue with use of     FES cycle with addition of abdominal stimulation (adjusting intervals as needed); core strengthening; training in KAFOs.     Roselia Ludwig, PT

## 2024-11-14 ENCOUNTER — CLINICAL SUPPORT (OUTPATIENT)
Dept: REHABILITATION | Facility: HOSPITAL | Age: 64
End: 2024-11-14
Payer: COMMERCIAL

## 2024-11-14 DIAGNOSIS — R26.89 DECREASED FUNCTIONAL MOBILITY: Primary | ICD-10-CM

## 2024-11-14 DIAGNOSIS — G82.20 SPINAL PARAPLEGIA: ICD-10-CM

## 2024-11-14 PROCEDURE — 97530 THERAPEUTIC ACTIVITIES: CPT | Mod: KX,PO

## 2024-11-14 NOTE — PROGRESS NOTES
"[facility]  Outpatient Physical Therapy Daily Treatment Note      Name: Taisha Nance  Clinic Number: [id number]  Visit Date: 11/14/2024    Therapy Diagnosis:   Encounter Diagnoses   Name Primary?    Decreased functional mobility Yes    Spinal paraplegia        Physician: Justin Harden MD    Physician Orders: PT Eval and Treat   Medical Diagnosis from Referral:   Diagnosis   G82.22 (ICD-10-CM) - Incomplete paraplegia      Evaluation Date: 8/19/2024  Authorization Period Expiration: 12-  Plan of Care Expiration: 11-  Visit # / Visits authorized: 15/30 (16)      Time In:   2:32 pm    Time Out:  3:15 pm     Total Appointment Time (timed & untimed codes): 43   minutes     Precautions: Standard and Fall    Subjective         Patient arrived to therapy today with her mother and father. Arrived with bilateral KAFOs.   " I am doing good. Nothing new."    Response to previous treatment: good  Functional change: improving postural awareness and trunk control    Pain: 0/10     Location:  n/a       Objective     therapeutic activities to improve functional performance for 43  minutes, including:     Doffed bilateral AFOs  Donned bilateral KAFOs with mod A needed.       - WC < --> mat transfer mod I     -  Sit to stands x 4 trials josefa walker mod a x 1-2 for upright posture and assist locking bilateral KAFOS in extension. Once standing, pt required min A x 2 person for upright posture. Cues to activate core. Pt able to stand 5 min 30 seconds and 3 min..     Pt ambulated 48 ft  ,56 ft and 44 ftwith josefa walker with min/ mod a x 2 with third person with WC follow. Assist needed to unlock KAFOs when needing rest breaks.          (Therapeutic rest breaks throughout as needed).       Pt and therapist assist to doff KAFOs and don AFOs.     Patient Education and HEP     She was compliant with home exercise program.    Education provided:     - pt wanting to do Lite Gait training. Educated " about time and day. Reports understanding.     Written Home Exercises Provided: Patient instructed to cont prior HEP.  Exercises were reviewed and Taisha was able to demonstrate them prior to the end of the session.  Taisha demonstrated fair  understanding of the education provided.     See EMR under Patient Instructions for exercises provided prior visit.    Assessment        Pt tolerated session well. Pt able to stand for 5 min and 3 min  sec which is much longer than previous trial with josefa walker. Pt also able to ambulate  3 trials today at 48 ft  56 ft and 44 ft with josefa walker min/ mod A x 2 person with third person with WC follow. Pt remains motivated to improve and a good candidate for PT.       She  will continue to benefit from skilled outpatient physical therapy to address the deficits listed in the problem list box on initial evaluation, provide pt/family education and to maximize pt's level of independence in the home and community environment.     Taisha Is progressing well towards her goals.   Pt prognosis is Good.     Pt's spiritual, cultural and educational needs considered and pt agreeable to plan of care and goals.    Anticipated barriers to physical therapy: chronicity of her condition     Goals:   Short Term Goals: 2 weeks    Patient to be independent with initial home exercise program of self passive range of motion MET 09-  Patient to demonstrate improved positioning in her wheelchair and sitting to reinforce range of motion activities in PT MET 09-     Long Term Goals: 6 weeks    Patient to be independent with advance home exercise program of self range of motion to her ankles and feet, ongoing, progressing  Patient to demonstrate adequate passive range of motion for positioning in the exoskeleton for assisted stance and gaiting ongoing progressing  Updated 09-: patient to be able to assume posterior pelvic tilts while in stance frame and sustain x 30 seconds for supported  gaiting.    Plan     Plan of care Certification: 8/19/2024 to 11-.     Outpatient Physical Therapy 2 times weekly for 6 week    Recommendations for next treatment session: continue with use of      FES cycle with addition of abdominal stimulation (adjusting intervals as needed); core strengthening; training in KAFOs.     Roselia Ludwig, PT
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
## 2024-11-18 ENCOUNTER — CLINICAL SUPPORT (OUTPATIENT)
Dept: REHABILITATION | Facility: HOSPITAL | Age: 64
End: 2024-11-18
Payer: MEDICARE

## 2024-11-18 DIAGNOSIS — G82.20 SPINAL PARAPLEGIA: ICD-10-CM

## 2024-11-18 DIAGNOSIS — R26.89 DECREASED FUNCTIONAL MOBILITY: Primary | ICD-10-CM

## 2024-11-18 PROCEDURE — 97530 THERAPEUTIC ACTIVITIES: CPT | Mod: KX,PO

## 2024-11-22 ENCOUNTER — CLINICAL SUPPORT (OUTPATIENT)
Dept: REHABILITATION | Facility: HOSPITAL | Age: 64
End: 2024-11-22
Payer: MEDICARE

## 2024-11-22 DIAGNOSIS — R26.89 DECREASED FUNCTIONAL MOBILITY: ICD-10-CM

## 2024-11-22 DIAGNOSIS — G82.20 SPINAL PARAPLEGIA: ICD-10-CM

## 2024-11-22 DIAGNOSIS — M25.671 DECREASED RANGE OF MOTION OF BOTH ANKLES: Primary | ICD-10-CM

## 2024-11-22 DIAGNOSIS — M25.672 DECREASED RANGE OF MOTION OF BOTH ANKLES: Primary | ICD-10-CM

## 2024-11-22 PROCEDURE — 97530 THERAPEUTIC ACTIVITIES: CPT | Mod: PO

## 2024-11-22 NOTE — PROGRESS NOTES
[facility]  Outpatient Physical Therapy Daily Treatment Note    Reassessment and DC 11-    Name: Taisha Nance  Clinic Number: [id number]  Visit Date: 11/22/2024    Therapy Diagnosis:   Encounter Diagnoses   Name Primary?    Decreased range of motion of both ankles Yes    Decreased functional mobility     Spinal paraplegia      Physician: Justin Harden MD    Physician Orders: PT Eval and Treat   Medical Diagnosis from Referral:   Diagnosis   G82.22 (ICD-10-CM) - Incomplete paraplegia      Evaluation Date: 8/19/2024  Authorization Period Expiration: 12-  Plan of Care Expiration: 11-  Visit # / Visits authorized: 15/30 (16)      Time In:  2:32 pm    Time Out:  3:15 pm   Total Appointment Time (timed & untimed codes): 43  minutes     Precautions: Standard and Fall    Subjective       Patient arrived to therapy today with her mother. Arrived with bilateral KAFOs.   She has not yet contacted the wheelchair rep to discuss new cushion cover and chair adjustments for optimal seating position.  She has not follow up with the [employer] rep regarding replacing the missing components of the system so that she can transfer back to clinic on the [facility] to resume training on it.   The prescription for rewalk training was written by Dr. Harden 10-.    Response to previous treatment: good  Functional change: improving postural awareness and trunk control    Pain: 0/10      Location:  n/a       Objective     therapeutic activities to improve functional performance for 50 minutes, including:  Assessment of passive range of motion of bilateral ankles - both with neutral dorsiflexion and (+) improvement in sub-talar mobility.   She has had her AFO's modified to promote optimal positioning of ankle and feet for pre-gait training.   Review of self mobilizations.  Patient unable to demonstrate proper technique initially - she was pinching her foot and not mobilizing as  "previously instructed.  Re-educated in self mobilizations to the heel, midfoot and long bones for improved accessory mobility and dorsiflexion in prep for ReWalk training.  Patient was video'd on her phone completing mobilizations, using improved technique.  Reviewed and updated home exercise program to include sit backs in straight and diagonal planes.  She was issued a written home exercise program with same.  Assisted patient in making a "to do" list, including follow up with the wheelchair vendor and Re[employer] rep.  Copy scanned into wrap up.  Patient was recommended to contact the [facility], that she was working with prior to set up appointment for ReWalk training.  PT to notify Owen Esdras with [facility] of the above to facilitate transfer to [facility].        Patient Education and HEP     She was compliant with home exercise program.    Education provided:      - pt wanting to do Lite Gait training. Educated about time and day. Reports understanding.     Written Home Exercises Provided: Patient instructed to cont prior HEP.  Exercises were reviewed and Taisha was able to demonstrate them prior to the end of the session.  Taisha demonstrated fair  understanding of the education provided.     See EMR under Patient Instructions for exercises provided prior visit.    Assessment   Goals have been met as established upon transfer to [facility].   Patient is ready to resume ReWalk training.  Per the vendor, he has a new computer and walking poles ready for her.   She is appropriate for DC at this time and transfer to other facility.  Taisha has not follow up as recommended weeks ago regarding wheelchair seating and positioning.  She has a task list for wheelchair adjustments and care coordination that she needs to complete.  Patient has equipment at home including // bars. FES bike, standing frame and KAFO's, along with detailed home exercise program to be completed in the interim with transferring to " alternate facility.    Taisha Is progressing well towards her goals.   Pt prognosis is Good.     Pt's spiritual, cultural and educational needs considered and pt agreeable to plan of care and goals.    Anticipated barriers to physical therapy: chronicity of her condition     Goals:   Short Term Goals: 2 weeks    Patient to be independent with initial home exercise program of self passive range of motion MET 09-  Patient to demonstrate improved positioning in her wheelchair and sitting to reinforce range of motion activities in PT MET 09-     Long Term Goals: 6 weeks    Patient to be independent with advance home exercise program of self range of motion to her ankles and feet, MET 11-  Patient to demonstrate adequate passive range of motion for positioning in the exoskeleton for assisted stance and gaiting ongoing progressing MET 11-  Updated 09-: patient to be able to assume posterior pelvic tilts while in stance frame and sustain x 30 seconds for supported gaiting. MET     Plan     Plan of care Certification: 8/19/2024 to 11-.    DC to AMBAR Giron, PT  CLT

## 2024-11-25 PROBLEM — R26.89 DECREASED FUNCTIONAL MOBILITY: Status: RESOLVED | Noted: 2024-04-30 | Resolved: 2024-11-25

## 2024-11-25 PROBLEM — M25.672 DECREASED RANGE OF MOTION OF BOTH ANKLES: Status: RESOLVED | Noted: 2024-04-30 | Resolved: 2024-11-25

## 2024-11-25 PROBLEM — M25.671 DECREASED RANGE OF MOTION OF BOTH ANKLES: Status: RESOLVED | Noted: 2024-04-30 | Resolved: 2024-11-25

## 2024-11-25 PROBLEM — G82.20: Status: RESOLVED | Noted: 2024-08-25 | Resolved: 2024-11-25

## 2024-11-27 DIAGNOSIS — G82.22 INCOMPLETE PARAPLEGIA: Primary | ICD-10-CM

## 2025-05-13 NOTE — PROGRESS NOTES
Outpatient Rehab    Physical Therapy Evaluation (only)    Patient Name: Taisha Nance  MRN: [id number]  YOB: 1960  Encounter Date: 5/14/2025    Therapy Diagnosis:   Encounter Diagnosis   Name Primary?    Impaired functional mobility, balance, gait, and endurance Yes     Physician: Justin Harden MD    Physician Orders: Eval and Treat  Medical Diagnosis: Incomplete paraplegia    Visit # / Visits Authorized:  1 / 1  Insurance Authorization Period: 11/27/2024 to 11/27/2025  Date of Evaluation: 5/14/2025  Plan of Care Certification: 5/14/2025 to 7/9/2025 (to allow for follow up scheduling)     Time In: 1432   Time Out: 1515  Total Time (in minutes): 43   Total Billable Time (in minutes): 43    Intake Outcome Measure for FOTO Survey    Therapist reviewed FOTO scores for Taisha Nance on 5/14/2025.   FOTO report - see Media section or FOTO account episode details.     Intake Score:  %    Precautions:       Subjective   History of Present Illness  Taisha is a 65 y.o. female          Diagnostic tests related to this condition: Bone scan.   Bone Scan Details: spine: normal; bilateral hips: osteopenia    Dominant Hand: Right  History of Present Condition/Illness: She went to the [facility] [place] [facility] for 2 weeks and did an intensive rehab where she got to use their ekso as well as lots of FES using Xcite and other modalities. She was able to get to neutral at the ankle when standing up. She was told she needs to use e-stim a lot more throughout the big muscles of her legs. She also did something similar to the [employer] for automatic stepping practice. She did aquatic therapy too while there.     Treatment History  Treatments  Discharged From Past 30 Days: Outpatient therapy  Previously Received Treatments: Yes  Previous Treatments: Physical therapy, Exercise, Electrical stimulation, Home exercise program    Living Arrangements  Living Situation  Housing: Home  independently  Living Arrangements: Family members  Support Systems: Family members          Past Medical History/Physical Systems Review:   Taisha Nance  has no past medical history on file.    Taisha Nance  has no past surgical history on file.    Taisha has a current medication list which includes the following prescription(s): alprazolam, alprazolam, amlodipine, calcium carbonate, fluconazole, fluticasone propionate, hydroxychloroquine, hyoscyamine, hyoscyamine, lactobacillus no.46/b.animalis, multivitamin, nitrofurantoin, pantoprazole, temazepam, and venlafaxine.    Review of patient's allergies indicates:   Allergen Reactions    Bactrim [sulfamethoxazole-trimethoprim]      Causes a rash        Objective       Hip Range of Motion   Right Hip   Active (deg) Passive (deg) Pain   Flexion   115     Extension         ABduction         ADduction         External Rotation 90/90         External Rotation Prone         Internal Rotation 90/90         Internal Rotation Prone             Left Hip   Active (deg) Passive (deg) Pain   Flexion   120     Extension         ABduction         ADduction         External Rotation 90/90         External Rotation Prone         Internal Rotation 90/90         Internal Rotation Prone                  Knee Range of Motion   Right Knee   Active (deg) Passive (deg) Pain   Flexion   150     Extension   -3         Left Knee   Active (deg) Passive (deg) Pain   Flexion   148     Extension   -7              Ankle/Foot Range of Motion   Right Ankle/Foot   Active (deg) Passive (deg) Pain   Dorsiflexion (KE)   -20     Dorsiflexion (KF)   -17     Plantar Flexion         Ankle Inversion         Ankle Eversion         Subtalar Inversion         Subtalar Eversion         Great Toe MTP Flexion         Great Toe MTP Extension         Great Toe IP Flexion             Left Ankle/Foot   Active (deg) Passive (deg) Pain   Dorsiflexion (KE)   -8     Dorsiflexion (KF)         Plantar Flexion          Ankle Inversion         Ankle Eversion         Subtalar Inversion         Subtalar Eversion         Great Toe MTP Flexion         Great Toe MTP Extension         Great Toe IP Flexion                            Hip Strength - Planes of Motion   Right Strength Right Pain Left Strength Left  Pain   Flexion (L2) 2-   2-     Extension 0   0     ABduction 0   0     ADduction 0   0     Internal Rotation           External Rotation               Knee Strength   Right Strength Right Pain Left Strength Left  Pain   Flexion (S2) 0   0     Prone Flexion 0   0     Extension (L3) 0   0            Ankle/Foot Strength - Planes of Motion   Right Strength Right Pain Left Strength Left  Pain   Dorsiflexion (L4) 0   0     Plantar Flexion (S1) 0   0     Inversion 0   0     Eversion 0   0     Great Toe Flexion 0   0     Great Toe Extension (L5) 0   0     Lesser Toes Flexion 0   0     Lesser Toes Extension 0   0            Coordination  Balance  Intact: Static Sitting and Dynamic Sitting  Unable: Static Standing and Dynamic Standing  Unable without bracing and upper extremity support               Time Entry(in minutes):  PT Evaluation (Low) Time Entry: 43    Assessment & Plan   Assessment  Taisha presents with a condition of Low complexity.   Presentation of Symptoms: Stable       Functional Limitations: Functional mobility, Standing tolerance, Range of motion  Impairments: Abnormal muscle tone, Abnormal or restricted range of motion, Impaired physical strength  Personal Factors Affecting Prognosis: Transportation    Patient Goal for Therapy (PT): to use an exoskeleton or perform gait training using devices as able  Prognosis: Fair  Assessment Details: Taisha presents to outpatient neuro PT evaluation for reconsideration of [facility] [employer] [facility] She remains with non-functional strength throughout bilateral lower extremities but continues to be independent with wheelchair level functional mobility as well as home exercise  program focused on electrical stimulation, stretching, and standing using a standing frame or KAFOs. While her hip and knee range of motion are good, she continues to have limited ankle DF range of motion with a tendency towards inversion (R>L) when attempted to achieve DF. In addition, she currently has a healing blister wound to her right 5th metatarsal head on the plantar surface of her foot, likely due to excessive inversion in a weight bearing position leading to more weight on the lateral surface of her foot. Due to ReWalk specifications of needing neutral or close to neutral ankle range of motion and no significant inversion/eversion, she is not an appropriate candidate for ReWalk exoskeleton training at this time. However, she has responded very well to traditional PT including gait training using her KAFOs as well as FES cycling. She would be an appropriate candidate to transfer to a clinic closer to home to participate in traditional PT to improve overall weight bearing tolerance, ankle range of motion, and gait training as able.     Plan  From a physical therapy perspective, the patient would benefit from: Skilled Rehab Services    Planned therapy interventions include: Therapeutic exercise, Therapeutic activities, Neuromuscular re-education, Manual therapy, and Gait training.    Planned modalities to include: Electrical stimulation - attended, Cryotherapy (cold pack), and Thermotherapy (hot pack).                   This plan was discussed with Patient.   Discussion participants: Agreed Upon Plan of Care  Plan details: Patient to be transferred to facility closer to home. Patient will discuss with family on if she would like to transfer within the [facility] system or seek out a different neuro clinic for PT. Plan of care frequency and goals to be set by supervising therapist.           Patient's spiritual, cultural, and educational needs considered and patient agreeable to plan of care and goals.      Education  Education was done with Patient and Other recipient present. The patient's learning style includes Demonstration and Listening. The patient Demonstrates understanding and Verbalizes understanding.  They identified as Parent. The reported learning style is Listening. The recipient Verbalizes understanding.     Ankle range of motion needed for ReWalk and different between different types of exoskeleton and robotic devices for gait training. Reinforced previous edu re: ankle stretches, standing/weight bearing to improve ankle range of motion.        Goals: to be established by supervising therapist when transferred to clinic closer to home     Daysi Gale, PT

## 2025-05-14 ENCOUNTER — CLINICAL SUPPORT (OUTPATIENT)
Dept: REHABILITATION | Facility: HOSPITAL | Age: 65
End: 2025-05-14
Payer: MEDICARE

## 2025-05-14 DIAGNOSIS — Z74.09 IMPAIRED FUNCTIONAL MOBILITY, BALANCE, GAIT, AND ENDURANCE: Primary | ICD-10-CM

## 2025-05-14 PROCEDURE — 97161 PT EVAL LOW COMPLEX 20 MIN: CPT | Mod: PO

## 2025-05-27 ENCOUNTER — CLINICAL SUPPORT (OUTPATIENT)
Dept: REHABILITATION | Facility: HOSPITAL | Age: 65
End: 2025-05-27
Payer: MEDICARE

## 2025-05-27 VITALS — DIASTOLIC BLOOD PRESSURE: 74 MMHG | SYSTOLIC BLOOD PRESSURE: 123 MMHG | HEART RATE: 79 BPM

## 2025-05-27 DIAGNOSIS — Z74.09 IMPAIRED FUNCTIONAL MOBILITY, BALANCE, GAIT, AND ENDURANCE: Primary | ICD-10-CM

## 2025-05-27 PROCEDURE — 97161 PT EVAL LOW COMPLEX 20 MIN: CPT | Mod: PO

## 2025-05-27 NOTE — PROGRESS NOTES
"  Outpatient Rehab    Physical Therapy Evaluation    Patient Name: Taihsa Nance  MRN: [id number]  YOB: 1960  Encounter Date: 5/27/2025    Therapy Diagnosis:   Encounter Diagnosis   Name Primary?    Impaired functional mobility, balance, gait, and endurance Yes     Physician: Justin Harden MD    Physician Orders: Eval and Treat  Medical Diagnosis: Impaired functional mobility, balance, gait, and endurance    Visit # / Visits Authorized:  1 / 1  Insurance Authorization Period: 5/15/2025 to 12/31/2025  Date of Evaluation: 5/27/2025  Plan of Care Certification: 5/27/2025 to 8/19/25     Time In: 1351   Time Out: 1430  Total Time (in minutes): 39   Total Billable Time (in minutes): 39    Intake Outcome Measure for FOTO Survey    Therapist reviewed FOTO scores for Taisha Nance on 5/27/2025.   FOTO report - see Media section or FOTO account episode details.     Intake Score: 46%    Precautions:     Standard and fall       Subjective   History of Present Illness  Taisha is a 65 y.o. female                  History of Present Condition/Illness: Pt arrives to therapy in [facility] with mom present. Pt reports she just went to [place] for 2 weeks for intensive outpatient SCI rehab. Reports she was doing 3 hours of therapy a day for 5 days as a week as well as aquatic rehab. Pt reports she was using a robotic walker, did the Zero G, spinal stimulation, core strengthening. Pt reports she had a doctor appt for 3 hours and they redid the NISREEN exam. Pt reports she is now classified as " t 11". Pt reports she did a full body Dexa scan and reports she has osteopenia in her hips but everything else " looks good." Pt is still periodically going to [facility] From PT note 5/14/25 Taisha is a 65 y.o. female   History of Present Condition/Illness: She went to the [facility] [place] at [facility] for 2 weeks and did an intensive rehab where she got to use their ekso as well as lots of FES " "using Xcite and other modalities. She was able to get to neutral at the ankle when standing up. She was told she needs to use e-stim a lot more throughout the big muscles of her legs. She also did something similar to the [employer] for automatic stepping practice. She did aquatic therapy too while there.  From PT note 8/19/24 History of current condition - Taisha reports: she has been attending PT 2' paraplegia status-post T10-11 transverse myelitis.   She has her own wheelchair, FES bike, KAFO's and FES exoskeleton for her legs. There are parallel bars in the home.  She uses a vibration plate, standing frame, shower chair with sliding board in the home.  She is unable to use the exoskeleton for walking as the range of motion to bilateral feet is decreased and she cannot get to neutral dorsiflexion and supination/pronation.   She is transferred to [facility] for mobilization and pre-gaiting supported weightbear activities in prep for use of the exoskeleton device.      Pain     Patient reports a current pain level of 5/10. Pain at best is reported as 3/10. Pain at worst is reported as 7/10.   Location: reports a " squeezing feeling" often in both hips anad quads.         Living Arrangements  Living Situation  Housing: Home independently  Living Arrangements: Family members  Support Systems: Family members        Employment  Patient reports: Does the patient's condition impact their ability to work?      NA      Past Medical History/Physical Systems Review:   Taisha Nance  has no past medical history on file.    Taisha Nance  has no past surgical history on file.    Taisha has a current medication list which includes the following prescription(s): alprazolam, alprazolam, amlodipine, calcium carbonate, fluconazole, fluticasone propionate, hydroxychloroquine, hyoscyamine, hyoscyamine, lactobacillus no.46/b.animalis, multivitamin, nitrofurantoin, pantoprazole, temazepam, and venlafaxine.    Review of patient's " allergies indicates:   Allergen Reactions    Bactrim [sulfamethoxazole-trimethoprim]      Causes a rash        Objective   Vital Signs  /74   Pulse 79                   Vision  Right Eye Functional Acuity: Intact           Posture  Patient presents with a Forward head position. Increased thoracic kyphosis is observed.   Shoulders are Rounded.              Hip Range of Motion   Right Hip   Active (deg) Passive (deg) Pain   Flexion   115     Extension         ABduction         ADduction         External Rotation 90/90         External Rotation Prone         Internal Rotation 90/90         Internal Rotation Prone             Left Hip   Active (deg) Passive (deg) Pain   Flexion   120     Extension         ABduction         ADduction         External Rotation 90/90         External Rotation Prone         Internal Rotation 90/90         Internal Rotation Prone                  Knee Range of Motion   Right Knee   Active (deg) Passive (deg) Pain   Flexion   150     Extension   -3         Left Knee   Active (deg) Passive (deg) Pain   Flexion   148     Extension   -7              Ankle/Foot Range of Motion   Right Ankle/Foot   Active (deg) Passive (deg) Pain   Dorsiflexion (KE)   -20     Dorsiflexion (KF)   -17     Plantar Flexion         Ankle Inversion         Ankle Eversion         Subtalar Inversion         Subtalar Eversion         Great Toe MTP Flexion         Great Toe MTP Extension         Great Toe IP Flexion             Left Ankle/Foot   Active (deg) Passive (deg) Pain   Dorsiflexion (KE)   -8     Dorsiflexion (KF)         Plantar Flexion         Ankle Inversion         Ankle Eversion         Subtalar Inversion         Subtalar Eversion         Great Toe MTP Flexion         Great Toe MTP Extension         Great Toe IP Flexion                            Hip Strength - Planes of Motion   Right Strength Right Pain Left Strength Left  Pain   Flexion (L2) 2-   2-     Extension 0   0     ABduction 0   0      ADduction 0   0     Internal Rotation           External Rotation               Knee Strength   Right Strength Right Pain Left Strength Left  Pain   Flexion (S2) 0   0     Prone Flexion 0   0     Extension (L3) 0   0            Ankle/Foot Strength - Planes of Motion   Right Strength Right Pain Left Strength Left  Pain   Dorsiflexion (L4) 0   0     Plantar Flexion (S1) 0   0     Inversion 0   0     Eversion 0   0     Great Toe Flexion 0   0     Great Toe Extension (L5) 0   0     Lesser Toes Flexion 0   0     Lesser Toes Extension 0   0            Transfers Assessment  Chair to Bed Assistance: Supervision  Bed to Chair Assistance: Supervision  Wheelchair Transfer Assistance: Supervision    Bed Mobility Assessment  Rolling Assistance: Independent  Sidelying to Sit Assistance: Supervision  Scooting to Edge of Bed Assistance: Supervision      Ambulation Details    Unable to functionally ambulate at this time    Gait Analysis  Gait Analysis Details  Static sit balance: fair -   dynamic sit balance fair     sit to stand with josefa walker max a x 2 person with no braces on.          Treatment:       Time Entry(in minutes):  PT Evaluation (Low) Time Entry: 39    Assessment & Plan   Assessment  Taisha presents with a condition of Low complexity.   Presentation of Symptoms: Stable       Functional Limitations: Functional mobility, Standing tolerance, Range of motion  Impairments: Abnormal muscle tone, Abnormal or restricted range of motion, Impaired physical strength  Personal Factors Affecting Prognosis: Transportation    Patient Goal for Therapy (PT): to improve core strength and ability to stand/ walk  Prognosis: Fair  Assessment Details: Pt tolerated session well. Pt with a history of transverse myelitis. Pt recently attended and intensive outpatient rehab program in [place] that she was able to use various devices to try and improve her mobility. Pt requires max a x 2 to  assist ot stand. Pt has fair - static sit balance  and fair dynamic sit balance. Pt would like to improve her sitting balance both static and dynamically, she would like to improve her ability to stand and walk with various types of FES and equipment to improve function, blood flow, improve strength in muscules in lower extremities. Pt would also like to improve core strength. PT is motivated to improve and a good candidate for PT.     Plan  From a physical therapy perspective, the patient would benefit from: Skilled Rehab Services    Planned therapy interventions include: Therapeutic exercise, Therapeutic activities, Neuromuscular re-education, Manual therapy, Gait training, Wheelchair management, and Orthotic management and training.    Planned modalities to include: Electrical stimulation - attended.        Visit Frequency: 2 times Per Week for 12 Weeks.       This plan was discussed with Patient and Caregiver.   Discussion participants: Agreed Upon Plan of Care  Plan details: 2x12 weeks 8/19/25          The patient's spiritual, cultural, and educational needs were considered, and the patient is agreeable to the plan of care and goals.     Education  Education was done with Patient. The patient's learning style includes Demonstration and Listening. The patient Demonstrates understanding and Verbalizes understanding.         - educated about role of PT in outpatient rehab. Educated about plan of care. Educated about attendance policy. Reports understanding and has no questions at this time.         Goals:   Active       long term goals       Patient will be independent and compliant with updated HEP.       Start:  05/27/25    Expected End:  08/19/25            Pt will improve dynamic sit balance from fair to good to improve safety and function.        Start:  05/27/25    Expected End:  08/19/25            Patient will improve her FOTO score from 46 limited  to at least 50 limited for improved self perception of functional mobility.(score 0-100, high score  indicates greater level of function)       Start:  05/27/25    Expected End:  08/19/25            Pt will be able to pull to stand using a grab bar from max a x 2 assist to max assist to improve independence and assist with functional improvements       Start:  05/27/25    Expected End:  07/08/25               short term goals       Patient will be provided with an HEP for strength, endurance and balance.        Start:  05/27/25    Expected End:  07/08/25            Pt will improve static sit balance from fair- to fair to improve safety and function.        Start:  05/27/25    Expected End:  07/08/25                Roselia Ludwig, PT

## 2025-05-27 NOTE — LETTER
May 27, 2025  Justin Harden MD  [address] [place] [address]  [address] [place] [id number]    To whom it may concern,     The attached plan of care is being sent to you for review and reference.    You may indicate your approval by signing the document electronically, or by faxing/mailing a signed copy of the final page of this document back to the attention of Roselia Ludwig, PT:         Plan of Care 5/27/25   Effective from: 5/27/2025  Effective to: 8/19/2025    Active  Plan ID: [id number]           Participants as of 5/27/2025    Name Type Comments Contact Info    Justin Harden MD Referring Provider  [phone]      Last Plan Note     Author: Roselia Ludwig, PT Status: Signed Last edited: 5/27/2025  1:45 PM         Outpatient Rehab    Physical Therapy Evaluation    Patient Name: Taisha Nance  MRN: [id number]  YOB: 1960  Encounter Date: 5/27/2025    Therapy Diagnosis:   Encounter Diagnosis   Name Primary?    Impaired functional mobility, balance, gait, and endurance Yes     Physician: Justin Harden MD    Physician Orders: Eval and Treat  Medical Diagnosis: Impaired functional mobility, balance, gait, and endurance    Visit # / Visits Authorized:  1 / 1  Insurance Authorization Period: 5/15/2025 to 12/31/2025  Date of Evaluation: 5/27/2025  Plan of Care Certification: 5/27/2025 to 8/19/25     Time In: 1351   Time Out: 1430  Total Time (in minutes): 39   Total Billable Time (in minutes): 39    Intake Outcome Measure for FOTO Survey    Therapist reviewed FOTO scores for Taisha Nance on 5/27/2025.   FOTO report - see Media section or FOTO account episode details.     Intake Score: 46%    Precautions:     Standard and fall       Subjective   History of Present Illness  Taisha is a 65 y.o. female                  History of Present Condition/Illness: Pt arrives to therapy in custom manual  with mom present. Pt reports she just went to [place] for 2 weeks for intensive  "outpatient SCI rehab. Reports she was doing 3 hours of therapy a day for 5 days as a week as well as aquatic rehab. Pt reports she was using a robotic walker, did the Zero G, spinal stimulation, core strengthening. Pt reports she had a doctor appt for 3 hours and they redid the NISREEN exam. Pt reports she is now classified as " t 11". Pt reports she did a full body Dexa scan and reports she has osteopenia in her hips but everything else " looks good." Pt is still periodically going to [facility] From PT note 5/14/25 Taisha is a 65 y.o. female   History of Present Condition/Illness: She went to the [facility] [place] at [facility] for 2 weeks and did an intensive rehab where she got to use their ekso as well as lots of FES using Xcite and other modalities. She was able to get to neutral at the ankle when standing up. She was told she needs to use e-stim a lot more throughout the big muscles of her legs. She also did something similar to the [employer] for automatic stepping practice. She did aquatic therapy too while there.  From PT note 8/19/24 History of current condition - Taisha reports: she has been attending PT 2' paraplegia status-post T10-11 transverse myelitis.   She has her own wheelchair, FES bike, KAFO's and FES exoskeleton for her legs. There are parallel bars in the home.  She uses a vibration plate, standing frame, shower chair with sliding board in the home.  She is unable to use the exoskeleton for walking as the range of motion to bilateral feet is decreased and she cannot get to neutral dorsiflexion and supination/pronation.   She is transferred to [facility] for mobilization and pre-gaiting supported weightbear activities in prep for use of the exoskeleton device.      Pain     Patient reports a current pain level of 5/10. Pain at best is reported as 3/10. Pain at worst is reported as 7/10.   Location: reports a " squeezing feeling" often in both hips anad quads.         Living " Arrangements  Living Situation  Housing: Home independently  Living Arrangements: Family members  Support Systems: Family members        Employment  Patient reports: Does the patient's condition impact their ability to work?      NA      Past Medical History/Physical Systems Review:   Taisha Nance  has no past medical history on file.    Taisha Nance  has no past surgical history on file.    Taisha has a current medication list which includes the following prescription(s): alprazolam, alprazolam, amlodipine, calcium carbonate, fluconazole, fluticasone propionate, hydroxychloroquine, hyoscyamine, hyoscyamine, lactobacillus no.46/b.animalis, multivitamin, nitrofurantoin, pantoprazole, temazepam, and venlafaxine.    Review of patient's allergies indicates:   Allergen Reactions    Bactrim [sulfamethoxazole-trimethoprim]      Causes a rash        Objective   Vital Signs  /74   Pulse 79                   Vision  Right Eye Functional Acuity: Intact           Posture  Patient presents with a Forward head position. Increased thoracic kyphosis is observed.   Shoulders are Rounded.              Hip Range of Motion   Right Hip   Active (deg) Passive (deg) Pain   Flexion   115     Extension         ABduction         ADduction         External Rotation 90/90         External Rotation Prone         Internal Rotation 90/90         Internal Rotation Prone             Left Hip   Active (deg) Passive (deg) Pain   Flexion   120     Extension         ABduction         ADduction         External Rotation 90/90         External Rotation Prone         Internal Rotation 90/90         Internal Rotation Prone                  Knee Range of Motion   Right Knee   Active (deg) Passive (deg) Pain   Flexion   150     Extension   -3         Left Knee   Active (deg) Passive (deg) Pain   Flexion   148     Extension   -7              Ankle/Foot Range of Motion   Right Ankle/Foot   Active (deg) Passive (deg) Pain   Dorsiflexion (KE)    -20     Dorsiflexion (KF)   -17     Plantar Flexion         Ankle Inversion         Ankle Eversion         Subtalar Inversion         Subtalar Eversion         Great Toe MTP Flexion         Great Toe MTP Extension         Great Toe IP Flexion             Left Ankle/Foot   Active (deg) Passive (deg) Pain   Dorsiflexion (KE)   -8     Dorsiflexion (KF)         Plantar Flexion         Ankle Inversion         Ankle Eversion         Subtalar Inversion         Subtalar Eversion         Great Toe MTP Flexion         Great Toe MTP Extension         Great Toe IP Flexion                            Hip Strength - Planes of Motion   Right Strength Right Pain Left Strength Left  Pain   Flexion (L2) 2-   2-     Extension 0   0     ABduction 0   0     ADduction 0   0     Internal Rotation           External Rotation               Knee Strength   Right Strength Right Pain Left Strength Left  Pain   Flexion (S2) 0   0     Prone Flexion 0   0     Extension (L3) 0   0            Ankle/Foot Strength - Planes of Motion   Right Strength Right Pain Left Strength Left  Pain   Dorsiflexion (L4) 0   0     Plantar Flexion (S1) 0   0     Inversion 0   0     Eversion 0   0     Great Toe Flexion 0   0     Great Toe Extension (L5) 0   0     Lesser Toes Flexion 0   0     Lesser Toes Extension 0   0            Transfers Assessment  Chair to Bed Assistance: Supervision  Bed to Chair Assistance: Supervision  Wheelchair Transfer Assistance: Supervision    Bed Mobility Assessment  Rolling Assistance: Independent  Sidelying to Sit Assistance: Supervision  Scooting to Edge of Bed Assistance: Supervision      Ambulation Details    Unable to functionally ambulate at this time    Gait Analysis  Gait Analysis Details  Static sit balance: fair -   dynamic sit balance fair     sit to stand with josefa walker max a x 2 person with no braces on.          Treatment:       Time Entry(in minutes):  PT Evaluation (Low) Time Entry: 39    Assessment & Plan    Assessment  Taisha presents with a condition of Low complexity.   Presentation of Symptoms: Stable       Functional Limitations: Functional mobility, Standing tolerance, Range of motion  Impairments: Abnormal muscle tone, Abnormal or restricted range of motion, Impaired physical strength  Personal Factors Affecting Prognosis: Transportation    Patient Goal for Therapy (PT): to improve core strength and ability to stand/ walk  Prognosis: Fair  Assessment Details: Pt tolerated session well. Pt with a history of transverse myelitis. Pt recently attended and intensive outpatient rehab program in [place] that she was able to use various devices to try and improve her mobility. Pt requires max a x 2 to  assist ot stand. Pt has fair - static sit balance and fair dynamic sit balance. Pt would like to improve her sitting balance both static and dynamically, she would like to improve her ability to stand and walk with various types of FES and equipment to improve function, blood flow, improve strength in muscules in lower extremities. Pt would also like to improve core strength. PT is motivated to improve and a good candidate for PT.     Plan  From a physical therapy perspective, the patient would benefit from: Skilled Rehab Services    Planned therapy interventions include: Therapeutic exercise, Therapeutic activities, Neuromuscular re-education, Manual therapy, Gait training, Wheelchair management, and Orthotic management and training.    Planned modalities to include: Electrical stimulation - attended.        Visit Frequency: 2 times Per Week for 12 Weeks.       This plan was discussed with Patient and Caregiver.   Discussion participants: Agreed Upon Plan of Care  Plan details: 2x12 weeks 8/19/25          The patient's spiritual, cultural, and educational needs were considered, and the patient is agreeable to the plan of care and goals.     Education  Education was done with Patient. The patient's learning style  includes Demonstration and Listening. The patient Demonstrates understanding and Verbalizes understanding.         - educated about role of PT in outpatient rehab. Educated about plan of care. Educated about attendance policy. Reports understanding and has no questions at this time.         Goals:   Active       long term goals       Patient will be independent and compliant with updated HEP.       Start:  05/27/25    Expected End:  08/19/25            Pt will improve dynamic sit balance from fair to good to improve safety and function.        Start:  05/27/25    Expected End:  08/19/25            Patient will improve her FOTO score from 46 limited  to at least 50 limited for improved self perception of functional mobility.(score 0-100, high score indicates greater level of function)       Start:  05/27/25    Expected End:  08/19/25            Pt will be able to pull to stand using a grab bar from max a x 2 assist to max assist to improve independence and assist with functional improvements       Start:  05/27/25    Expected End:  07/08/25               short term goals       Patient will be provided with an HEP for strength, endurance and balance.        Start:  05/27/25    Expected End:  07/08/25            Pt will improve static sit balance from fair- to fair to improve safety and function.        Start:  05/27/25    Expected End:  07/08/25                Roselia Ludwig, PT             Sincerely,      Roselia Ludwig, PT  [facility]                                                            Dear Roselia Ludwig, PT,    RE: Ms. Taisha Nance, MRN: [id number]    I certify that I have reviewed the attached plan of care and agree to the details within.        ___________________________  ___________________________  Provider Printed Name   Provider Signed Name      ___________________________  Date and Time

## 2025-06-03 ENCOUNTER — CLINICAL SUPPORT (OUTPATIENT)
Dept: REHABILITATION | Facility: HOSPITAL | Age: 65
End: 2025-06-03
Payer: MEDICARE

## 2025-06-03 DIAGNOSIS — Z74.09 IMPAIRED FUNCTIONAL MOBILITY, BALANCE, GAIT, AND ENDURANCE: Primary | ICD-10-CM

## 2025-06-03 PROCEDURE — 97112 NEUROMUSCULAR REEDUCATION: CPT | Mod: PO

## 2025-06-06 ENCOUNTER — CLINICAL SUPPORT (OUTPATIENT)
Dept: REHABILITATION | Facility: HOSPITAL | Age: 65
End: 2025-06-06
Payer: MEDICARE

## 2025-06-06 DIAGNOSIS — Z74.09 IMPAIRED FUNCTIONAL MOBILITY, BALANCE, GAIT, AND ENDURANCE: Primary | ICD-10-CM

## 2025-06-06 PROCEDURE — 97112 NEUROMUSCULAR REEDUCATION: CPT | Mod: PO

## 2025-06-06 PROCEDURE — 97530 THERAPEUTIC ACTIVITIES: CPT | Mod: PO

## 2025-06-11 ENCOUNTER — CLINICAL SUPPORT (OUTPATIENT)
Dept: REHABILITATION | Facility: HOSPITAL | Age: 65
End: 2025-06-11
Payer: MEDICARE

## 2025-06-11 DIAGNOSIS — Z74.09 IMPAIRED FUNCTIONAL MOBILITY, BALANCE, GAIT, AND ENDURANCE: Primary | ICD-10-CM

## 2025-06-11 PROCEDURE — 97530 THERAPEUTIC ACTIVITIES: CPT | Mod: PO

## 2025-06-11 NOTE — PROGRESS NOTES
"  Outpatient Rehab    Physical Therapy Visit    Patient Name: Taisha Nance  MRN: [id number]  YOB: 1960  Encounter Date: 6/11/2025    Therapy Diagnosis:   Encounter Diagnosis   Name Primary?    Impaired functional mobility, balance, gait, and endurance Yes     Physician: Justin Harden MD    Physician Orders: Eval and Treat  Medical Diagnosis: Impaired functional mobility, balance, gait, and endurance  Surgical Diagnosis: Not applicable for this Episode   Surgical Date: Not applicable for this Episode    Visit # / Visits Authorized:  3 / 20  Insurance Authorization Period: 5/26/2025 to 12/31/2025  Date of Evaluation: 5/27/2025  Plan of Care Certification: 5/27/2025 to 8/19/2025      PT/PTA: PT   Number of PTA visits since last PT visit:0  Time In: 1519   Time Out: 1600  Total Time (in minutes): 41   Total Billable Time (in minutes): 41    FOTO:  Intake Score:  %  Survey Score 2:  %  Survey Score 3:  %    Precautions:     Standard and fall       Subjective   " I am good." pt reports she went to [facility] after last weeks session and did well. " I want to walk today.".  Family / care giver present for this visit:  (mom present and dad in lobby)    ssqueezing feeling in B hips and quads.    Objective            Treatment:  Therapeutic Activity  TA 1: WC to mat transfer SPV  TA 2: doffed B AFOS and donned B KAFOS with locking mechanism.  TA 3: sit to stands with josefa walker x 4 trials throughotu session max a x 2 person and total A to lock out KAFOs  TA 4: pt ambulated 245 ft, 120 ft and 125 ft with josefa walker with mod  a x 2 person assist with third person with wC follow. B KAFOS in locked position    Time Entry(in minutes):  Therapeutic Activity Time Entry: 41    Assessment & Plan   Assessment: Pt tolerated session well. pt wanting to work on ambulation today with josefa fonseca. pt able to walk around double the distance than she did on previous sessions. requires seated rest breaks in " ebtween laps. Pt remains motivated to improve and a good candidate for PT.       The patient will continue to benefit from skilled outpatient physical therapy in order to address the deficits listed in the problem list on the initial evaluation, provide patient and family education, and maximize the patients level of independence in the home and community environments.     The patient's spiritual, cultural, and educational needs were considered, and the patient is agreeable to the plan of care and goals.     Education  Education was done with Patient and Other recipient present. The patient's learning style includes Demonstration and Listening. The patient Demonstrates understanding and Verbalizes understanding. mom participated in education.        Educated about improved ambulation endurance. Reports understanding        Plan: standing in parallel bars    Goals:   Active       long term goals       Patient will be independent and compliant with updated HEP. (Progressing)       Start:  05/27/25    Expected End:  08/19/25            Pt will improve dynamic sit balance from fair to good to improve safety and function.  (Progressing)       Start:  05/27/25    Expected End:  08/19/25            Patient will improve her FOTO score from 46 limited  to at least 50 limited for improved self perception of functional mobility.(score 0-100, high score indicates greater level of function) (Progressing)       Start:  05/27/25    Expected End:  08/19/25            Pt will be able to pull to stand using a grab bar from max a x 2 assist to max assist to improve independence and assist with functional improvements (Progressing)       Start:  05/27/25    Expected End:  07/08/25               short term goals       Patient will be provided with an HEP for strength, endurance and balance.  (Progressing)       Start:  05/27/25    Expected End:  07/08/25            Pt will improve static sit balance from fair- to fair to improve safety  and function.  (Progressing)       Start:  05/27/25    Expected End:  07/08/25                Roselia Ludwig PT

## 2025-06-13 ENCOUNTER — CLINICAL SUPPORT (OUTPATIENT)
Dept: REHABILITATION | Facility: HOSPITAL | Age: 65
End: 2025-06-13
Payer: MEDICARE

## 2025-06-13 DIAGNOSIS — Z74.09 IMPAIRED FUNCTIONAL MOBILITY, BALANCE, GAIT, AND ENDURANCE: Primary | ICD-10-CM

## 2025-06-13 PROCEDURE — 97112 NEUROMUSCULAR REEDUCATION: CPT | Mod: PO

## 2025-06-13 PROCEDURE — 97530 THERAPEUTIC ACTIVITIES: CPT | Mod: PO

## 2025-06-13 NOTE — PROGRESS NOTES
Same pain.   1016 am   Supine   Sidelying plank from elbow 4 min each way.     3 kg weighted ball ab trunk rotations.   Chest press 3 kg  Overhead press   Bicep curls   Blocking knees quadruped. 3 min. Alternating arm lifts. Cat cow  Modified pushups. X 30

## 2025-06-13 NOTE — PROGRESS NOTES
"  Outpatient Rehab    Physical Therapy Visit    Patient Name: Taisha Nance  MRN: [id number]  YOB: 1960  Encounter Date: 6/13/2025    Therapy Diagnosis:   Encounter Diagnosis   Name Primary?    Impaired functional mobility, balance, gait, and endurance Yes     Physician: Justin Harden MD    Physician Orders: Eval and Treat  Medical Diagnosis: Impaired functional mobility, balance, gait, and endurance  Surgical Diagnosis: Not applicable for this Episode   Surgical Date: Not applicable for this Episode    Visit # / Visits Authorized:  4 / 20  Insurance Authorization Period: 5/26/2025 to 12/31/2025  Date of Evaluation: 5/27/2025  Plan of Care Certification: 5/27/2025 to 8/19/2025      PT/PTA: PT   Number of PTA visits since last PT visit:0  Time In: 1017   Time Out: 1100  Total Time (in minutes): 43   Total Billable Time (in minutes): 43    FOTO:  Intake Score:  %  Survey Score 2:  %  Survey Score 3:  %    Precautions:     Standard and fall       Subjective   " I am good.".  Family / care giver present for this visit:   Pain reported as 5/10. ssqueezing feeling in B hips and quads.    Objective            Treatment:  Balance/Neuromuscular Re-Education  NMR 1: sidelying modified plank 4 min on elbow each side.  NMR 2: prone modified partial pushups from knees 30 reps.  NMR 3: long sit: 3 kg weighted ball bicep curls 3x10  NMR 4: long sit: 3 kg weighted ball chest press  motion 3x10  NMR 5: long sit: 3 kg weighted ball overhead lifts 3x10  NMR 6: long sit: 3 kg weighted ball abdominal russian twists 3x10  NMR 7: quadruped position therapist blocking B LE: 3 min static hold  NMR 8: quadruped position therapist blocking B LE: alternating arm lifts x5 reps each way  NMR 9: quadruped position therapist blocking B LE:cat/ cow x 30 reps  Therapeutic Activity  TA 1: WC to mat transfer SPV  TA 2: doffed B AFOS with SPV  TA 3: supine to sidelying SPV  TA 4: supine to prone SPV  TA 5: prone to quadruped max a " to block B LE . otherwise min A  TA 6: mat to WC SPV    Time Entry(in minutes):  Neuromuscular Re-Education Time Entry: 33  Therapeutic Activity Time Entry: 10    Assessment & Plan   Assessment: Pt tolerated session well. working on core strength and sitting balance as well as strength in quadruped. pt remains motivated to improve and a good candidate for PT.       The patient will continue to benefit from skilled outpatient physical therapy in order to address the deficits listed in the problem list on the initial evaluation, provide patient and family education, and maximize the patients level of independence in the home and community environments.     The patient's spiritual, cultural, and educational needs were considered, and the patient is agreeable to the plan of care and goals.     Education  Education was done with Patient. The patient's learning style includes Demonstration and Listening.          Educated about core strength and endurance. Reports understanding.        Plan: walking with parallel bars.    Goals:   Active       long term goals       Patient will be independent and compliant with updated HEP. (Progressing)       Start:  05/27/25    Expected End:  08/19/25            Pt will improve dynamic sit balance from fair to good to improve safety and function.  (Progressing)       Start:  05/27/25    Expected End:  08/19/25            Patient will improve her FOTO score from 46 limited  to at least 50 limited for improved self perception of functional mobility.(score 0-100, high score indicates greater level of function) (Progressing)       Start:  05/27/25    Expected End:  08/19/25            Pt will be able to pull to stand using a grab bar from max a x 2 assist to max assist to improve independence and assist with functional improvements (Progressing)       Start:  05/27/25    Expected End:  07/08/25               short term goals       Patient will be provided with an HEP for strength,  endurance and balance.  (Progressing)       Start:  05/27/25    Expected End:  07/08/25            Pt will improve static sit balance from fair- to fair to improve safety and function.  (Progressing)       Start:  05/27/25    Expected End:  07/08/25                Roselia Ludwig, PT

## 2025-06-17 ENCOUNTER — CLINICAL SUPPORT (OUTPATIENT)
Dept: REHABILITATION | Facility: HOSPITAL | Age: 65
End: 2025-06-17
Payer: MEDICARE

## 2025-06-17 DIAGNOSIS — Z74.09 IMPAIRED FUNCTIONAL MOBILITY, BALANCE, GAIT, AND ENDURANCE: Primary | ICD-10-CM

## 2025-06-17 PROCEDURE — 97112 NEUROMUSCULAR REEDUCATION: CPT | Mod: PO

## 2025-06-17 NOTE — PROGRESS NOTES
"  Outpatient Rehab    Physical Therapy Visit    Patient Name: Taisha Nance  MRN: [id number]  YOB: 1960  Encounter Date: 6/17/2025    Therapy Diagnosis:   Encounter Diagnosis   Name Primary?    Impaired functional mobility, balance, gait, and endurance Yes     Physician: Justin Harden MD    Physician Orders: Eval and Treat  Medical Diagnosis: Impaired functional mobility, balance, gait, and endurance  Surgical Diagnosis: Not applicable for this Episode   Surgical Date: Not applicable for this Episode  Days Since Last Surgery: Not applicable for this Episode    Visit # / Visits Authorized:  5 / 20  Insurance Authorization Period: 5/26/2025 to 12/31/2025  Date of Evaluation: 5/27/2025  Plan of Care Certification: 5/27/2025 to 8/19/2025      PT/PTA: PT   Number of PTA visits since last PT visit:0  Time In: 1601   Time Out: 1645  Total Time (in minutes): 44   Total Billable Time (in minutes): 44    FOTO:  Intake Score:  %  Survey Score 2:  %  Survey Score 3:  %    Precautions:     Standard and fall       Subjective   " I am good ."  pt reports they left her KAFOs at home but do have AFOs..  Family / care giver present for this visit:   Pain reported as 5/10. ssqueezing feeling in B hips and quads.    Objective            Treatment:  Balance/Neuromuscular Re-Education  NMR 3: sitting edge of mat: 3 kg weighted ball bicep curls 3x10  NMR 4: sitting edge of mat: 3 kg weighted ball chest press  motion 3x10  NMR 5: sitting edge of mat: 3 kg weighted ball overhead lifts 3x10  NMR 6: sitting edge of mat: 3 kg weighted ball abdominal russian twists 3x10  NMR 7: sit to stands in parallel bars with therapist blocking knees mod A to assist to stand total A blocking knees. pt stood 3 min, 2 min, 3 min, 3 min, 3 min with seated rest breaks in between. min A once standing.  Therapeutic Activity  TA 1: WC to mat transfer mod I  TA 2: mat to WC mod I    Time Entry(in minutes):  Neuromuscular Re-Education Time " Entry: 39  Therapeutic Activity Time Entry: 5    Assessment & Plan   Assessment: Pt tolerated session well. Pt forgot KAFOs today. Pt wanted to perform standing in parallel bars. Pt able to stand 3 min x 4 trials and 2 min x 1 trials. Mod A to stand with total A blocking knees. Min A once standing. Pt remains motivated to improve and a good candidate for PT.        The patient will continue to benefit from skilled outpatient physical therapy in order to address the deficits listed in the problem list on the initial evaluation, provide patient and family education, and maximize the patients level of independence in the home and community environments.     The patient's spiritual, cultural, and educational needs were considered, and the patient is agreeable to the plan of care and goals.     Education  Education was done with Patient.           Educated about standing posture. Reports understanding.        Plan: walking with parallel bars.    Goals:   Active       long term goals       Patient will be independent and compliant with updated HEP. (Progressing)       Start:  05/27/25    Expected End:  08/19/25            Pt will improve dynamic sit balance from fair to good to improve safety and function.  (Progressing)       Start:  05/27/25    Expected End:  08/19/25            Patient will improve her FOTO score from 46 limited  to at least 50 limited for improved self perception of functional mobility.(score 0-100, high score indicates greater level of function) (Progressing)       Start:  05/27/25    Expected End:  08/19/25            Pt will be able to pull to stand using a grab bar from max a x 2 assist to max assist to improve independence and assist with functional improvements (Progressing)       Start:  05/27/25    Expected End:  07/08/25               short term goals       Patient will be provided with an HEP for strength, endurance and balance.  (Progressing)       Start:  05/27/25    Expected End:   07/08/25            Pt will improve static sit balance from fair- to fair to improve safety and function.  (Progressing)       Start:  05/27/25    Expected End:  07/08/25                Roselia Ludwig, PT

## 2025-06-20 ENCOUNTER — CLINICAL SUPPORT (OUTPATIENT)
Dept: REHABILITATION | Facility: HOSPITAL | Age: 65
End: 2025-06-20
Payer: MEDICARE

## 2025-06-20 DIAGNOSIS — Z74.09 IMPAIRED FUNCTIONAL MOBILITY, BALANCE, GAIT, AND ENDURANCE: Primary | ICD-10-CM

## 2025-06-20 PROCEDURE — 97530 THERAPEUTIC ACTIVITIES: CPT | Mod: PO

## 2025-06-20 NOTE — PROGRESS NOTES
"  Outpatient Rehab    Physical Therapy Visit    Patient Name: Taisha Nance  MRN: [id number]  YOB: 1960  Encounter Date: 6/20/2025    Therapy Diagnosis:   Encounter Diagnosis   Name Primary?    Impaired functional mobility, balance, gait, and endurance Yes     Physician: Justin Harden MD    Physician Orders: Eval and Treat  Medical Diagnosis: Impaired functional mobility, balance, gait, and endurance  Surgical Diagnosis: Not applicable for this Episode   Surgical Date: Not applicable for this Episode  Days Since Last Surgery: Not applicable for this Episode    Visit # / Visits Authorized:  6 / 20  Insurance Authorization Period: 5/26/2025 to 12/31/2025  Date of Evaluation: 5/27/2025  Plan of Care Certification: 5/27/2025 to 8/19/2025      PT/PTA: PT   Number of PTA visits since last PT visit:0  Time In: 1016   Time Out: 1100  Total Time (in minutes): 44   Total Billable Time (in minutes): 44    FOTO:  Intake Score:  %  Survey Score 2:  %  Survey Score 3:  %    Precautions:     Standard and fall       Subjective   " I am doing good. I am ready to walk. ".  Family / care giver present for this visit:   Pain reported as 5/10. squeezing feeling in B hips and quads.    Objective            Treatment:  Therapeutic Activity  TA 1: WC to mat transfer mod I  TA 2: mat to WC mod I  TA 3: pt doffed B AFOS and donned B KAFOs with SPV  TA 4: pt ambulated 240 ft x 2 trials with josefa walker with min/ mod A x 2 person assist with third person with wC follow. B KAFOS in locked position  TA 5: pt doffed B KAFOS and donned B AFOs with SPV  TA 6: sit to stands x 3 trials  with josefa walker max a x 2 person and total A to lock out KAFOs.    Time Entry(in minutes):  Therapeutic Activity Time Entry: 44    Assessment & Plan   Assessment: Pt tolerated session well. Pt requesting to ambulate with josefa walker today. Pt able to ambulate 240 ft x 2 trials today with min/ mod A with WC follow. Pt required less physical assist " to walk and manage walker today than previous sessions. Pt remains motivated to improve and a good candidate for PT.        The patient will continue to benefit from skilled outpatient physical therapy in order to address the deficits listed in the problem list on the initial evaluation, provide patient and family education, and maximize the patients level of independence in the home and community environments.     The patient's spiritual, cultural, and educational needs were considered, and the patient is agreeable to the plan of care and goals.     Education  Education was done with Patient.           Educated about progress with needing less assist to walk and manage walker. Reports understanding.        Plan: walking with parallel bars.    Goals:   Active       long term goals       Patient will be independent and compliant with updated HEP. (Progressing)       Start:  05/27/25    Expected End:  08/19/25            Pt will improve dynamic sit balance from fair to good to improve safety and function.  (Progressing)       Start:  05/27/25    Expected End:  08/19/25            Patient will improve her FOTO score from 46 limited  to at least 50 limited for improved self perception of functional mobility.(score 0-100, high score indicates greater level of function) (Progressing)       Start:  05/27/25    Expected End:  08/19/25            Pt will be able to pull to stand using a grab bar from max a x 2 assist to max assist to improve independence and assist with functional improvements (Progressing)       Start:  05/27/25    Expected End:  07/08/25               short term goals       Patient will be provided with an HEP for strength, endurance and balance.  (Progressing)       Start:  05/27/25    Expected End:  07/08/25            Pt will improve static sit balance from fair- to fair to improve safety and function.  (Progressing)       Start:  05/27/25    Expected End:  07/08/25                Roselia Ludwig, PT

## 2025-06-24 ENCOUNTER — CLINICAL SUPPORT (OUTPATIENT)
Dept: REHABILITATION | Facility: HOSPITAL | Age: 65
End: 2025-06-24
Payer: MEDICARE

## 2025-06-24 DIAGNOSIS — Z74.09 IMPAIRED FUNCTIONAL MOBILITY, BALANCE, GAIT, AND ENDURANCE: Primary | ICD-10-CM

## 2025-06-24 PROCEDURE — 97530 THERAPEUTIC ACTIVITIES: CPT | Mod: PO

## 2025-06-24 PROCEDURE — 97112 NEUROMUSCULAR REEDUCATION: CPT | Mod: PO

## 2025-06-24 NOTE — PROGRESS NOTES
"  Outpatient Rehab    Physical Therapy Progress Note    Patient Name: Taisha Nance  MRN: [id number]  YOB: 1960  Encounter Date: 6/24/2025    Therapy Diagnosis:   Encounter Diagnosis   Name Primary?    Impaired functional mobility, balance, gait, and endurance Yes     Physician: Justin Harden MD    Physician Orders: Eval and Treat  Medical Diagnosis: Impaired functional mobility, balance, gait, and endurance  Surgical Diagnosis: Not applicable for this Episode   Surgical Date: Not applicable for this Episode  Days Since Last Surgery: Not applicable for this Episode    Visit # / Visits Authorized:  7 / 20  Insurance Authorization Period: 5/26/2025 to 12/31/2025  Date of Evaluation: 5/27/2025  Plan of Care Certification: 5/27/2025 to 8/19/2025      PT/PTA: PT   Number of PTA visits since last PT visit:0  Time In: 1515   Time Out: 1600  Total Time (in minutes): 45   Total Billable Time (in minutes): 45    FOTO:  Intake Score:  %  Survey Score 2:  %  Survey Score 3:  %    Precautions:     Standard and fall       Subjective   " I am good. let's walk some today.".  Family / care giver present for this visit:   Pain reported as 5/10. squeezing feeling in B hips and quads.    Objective            Treatment:  Balance/Neuromuscular Re-Education  NMR 7: sit to stands in parallel bars with therapist blocking knees mod A to assist to stand total A blocking knees. pt stood 3 min, min and , 3 min with seated rest breaks in between. min A once standing.  Therapeutic Activity  TA 1: WC to mat transfer mod I  TA 2: mat to WC mod I  TA 3: pt doffed B AFOS and donned B KAFOs with SPV  TA 4: pt ambulated 360 ft x 1 trials with josefa walker with min/ mod A x 2 person assist with third person with wC follow. B KAFOS in locked position  TA 5: pt doffed B KAFOS and donned B AFOs with SPV  TA 6: sit to stands x 3 trials  with josefa walker max a x 2 person and total A to lock out KAFOs.  TA 7: donned B AFOs SPV    Time " Entry(in minutes):  Neuromuscular Re-Education Time Entry: 10  Therapeutic Activity Time Entry: 35    Assessment & Plan   Assessment: Pt tolerated session well. Pt able to increase walking distance today by 120 ft since last session. Pt also able to perform sit to stands in parallel bars after for 3 min x 2 trials with therapist blocking bilateral lower extremity. Pt remains motivated to improve and a good candidate for PT.        The patient will continue to benefit from skilled outpatient physical therapy in order to address the deficits listed in the problem list on the initial evaluation, provide patient and family education, and maximize the patients level of independence in the home and community environments.     The patient's spiritual, cultural, and educational needs were considered, and the patient is agreeable to the plan of care and goals.     Education  Education was done with Patient. The patient's learning style includes Demonstration and Listening. The patient Demonstrates understanding and Verbalizes understanding.         Educated about increased walking distance today versus previous sessions. Reports understanding.        Plan: walking with parallel bars.    Goals:   Active       long term goals       Patient will be independent and compliant with updated HEP. (Progressing)       Start:  05/27/25    Expected End:  08/19/25            Pt will improve dynamic sit balance from fair to good to improve safety and function.  (Progressing)       Start:  05/27/25    Expected End:  08/19/25            Patient will improve her FOTO score from 46 limited  to at least 50 limited for improved self perception of functional mobility.(score 0-100, high score indicates greater level of function) (Progressing)       Start:  05/27/25    Expected End:  08/19/25            Pt will be able to pull to stand using a grab bar from max a x 2 assist to max assist to improve independence and assist with functional  improvements (Progressing)       Start:  05/27/25    Expected End:  07/08/25               short term goals       Patient will be provided with an HEP for strength, endurance and balance.  (Progressing)       Start:  05/27/25    Expected End:  07/08/25            Pt will improve static sit balance from fair- to fair to improve safety and function.  (Progressing)       Start:  05/27/25    Expected End:  07/08/25                Roselia Ludwig, PT

## 2025-06-27 ENCOUNTER — CLINICAL SUPPORT (OUTPATIENT)
Dept: REHABILITATION | Facility: HOSPITAL | Age: 65
End: 2025-06-27
Payer: MEDICARE

## 2025-06-27 DIAGNOSIS — Z74.09 IMPAIRED FUNCTIONAL MOBILITY, BALANCE, GAIT, AND ENDURANCE: Primary | ICD-10-CM

## 2025-06-27 PROCEDURE — 97530 THERAPEUTIC ACTIVITIES: CPT | Mod: PO

## 2025-06-27 PROCEDURE — 97112 NEUROMUSCULAR REEDUCATION: CPT | Mod: PO

## 2025-06-27 NOTE — PROGRESS NOTES
"    Outpatient Rehab    Physical Therapy Visit    Patient Name: Taisha Nance  MRN: [id number]  YOB: 1960  Encounter Date: 6/27/2025    Therapy Diagnosis:   Encounter Diagnosis   Name Primary?    Impaired functional mobility, balance, gait, and endurance Yes     Physician: Justin Harden MD    Physician Orders: Eval and Treat  Medical Diagnosis: Impaired functional mobility, balance, gait, and endurance  Surgical Diagnosis: Not applicable for this Episode   Surgical Date: Not applicable for this Episode  Days Since Last Surgery: Not applicable for this Episode    Visit # / Visits Authorized:  8 / 20  Insurance Authorization Period: 5/26/2025 to 12/31/2025  Date of Evaluation: 5/27/2025  Plan of Care Certification: 5/27/2025 to 8/19/2025      PT/PTA: PT   Number of PTA visits since last PT visit:0  Time In: 1019   Time Out: 1100  Total Time (in minutes): 41   Total Billable Time (in minutes): 41    FOTO:  Intake Score:  %  Survey Score 2:  %  Survey Score 3:  %    Precautions:     Standard and fall       Subjective   " I am good.".  Family / care giver present for this visit:   Pain reported as 5/10. squeezing feeling in B hips and quads.    Objective            Treatment:  Balance/Neuromuscular Re-Education  NMR 2: prone modified partial pushups from knees 50 reps.  NMR 8: quadruped position therapist blocking B LE: alternating arm lifts x5 reps each way  NMR 9: quadruped position therapist blocking B LE:cat/ cow x 30 reps  Therapeutic Activity  TA 1:  to mat transfer mod I  TA 2: mat to  mod I  TA 3: pt doffed B AFOS and donned B KAFOs with SPV  TA 5: pt doffed B KAFOS and donned B AFOs with SPV  TA 6: sit to stands x 3 trials with josefa walker max a x 2 person and total A to lock out KAFOs.  TA 7: static standing 4 min, 5 min with min A once standing.    Time Entry(in minutes):  Neuromuscular Re-Education Time Entry: 21  Therapeutic Activity Time Entry: 20    Assessment & Plan   Assessment: " Pt tolerated session well. Working on standing endurance 4 min, 5 min with eval walker with min A once standing with bilateral  KAFOs. Quadruped and prone pushups performed today. Pt remains motivated to improve and a good candidate for PT.        The patient will continue to benefit from skilled outpatient physical therapy in order to address the deficits listed in the problem list on the initial evaluation, provide patient and family education, and maximize the patients level of independence in the home and community environments.     The patient's spiritual, cultural, and educational needs were considered, and the patient is agreeable to the plan of care and goals.     Education  Education was done with Patient.  The patient Demonstrates understanding and Verbalizes understanding.                 Plan:      Goals:   Active       long term goals       Patient will be independent and compliant with updated HEP. (Progressing)       Start:  05/27/25    Expected End:  08/19/25            Pt will improve dynamic sit balance from fair to good to improve safety and function.  (Progressing)       Start:  05/27/25    Expected End:  08/19/25            Patient will improve her FOTO score from 46 limited  to at least 50 limited for improved self perception of functional mobility.(score 0-100, high score indicates greater level of function) (Progressing)       Start:  05/27/25    Expected End:  08/19/25            Pt will be able to pull to stand using a grab bar from max a x 2 assist to max assist to improve independence and assist with functional improvements (Progressing)       Start:  05/27/25    Expected End:  07/08/25               short term goals       Patient will be provided with an HEP for strength, endurance and balance.  (Progressing)       Start:  05/27/25    Expected End:  07/08/25            Pt will improve static sit balance from fair- to fair to improve safety and function.  (Progressing)       Start:   05/27/25    Expected End:  07/08/25                Roselia Ludwig, PT

## 2025-06-30 ENCOUNTER — CLINICAL SUPPORT (OUTPATIENT)
Dept: REHABILITATION | Facility: HOSPITAL | Age: 65
End: 2025-06-30
Payer: MEDICARE

## 2025-06-30 DIAGNOSIS — Z74.09 IMPAIRED FUNCTIONAL MOBILITY, BALANCE, GAIT, AND ENDURANCE: Primary | ICD-10-CM

## 2025-06-30 PROCEDURE — 97530 THERAPEUTIC ACTIVITIES: CPT | Mod: PO

## 2025-06-30 NOTE — PROGRESS NOTES
"    Outpatient Rehab    Physical Therapy Visit    Patient Name: Taisha Nance  MRN: [id number]  YOB: 1960  Encounter Date: 6/30/2025    Therapy Diagnosis:   Encounter Diagnosis   Name Primary?    Impaired functional mobility, balance, gait, and endurance Yes     Physician: Justin Harden MD    Physician Orders: Eval and Treat  Medical Diagnosis: Impaired functional mobility, balance, gait, and endurance  Surgical Diagnosis: Not applicable for this Episode   Surgical Date: Not applicable for this Episode  Days Since Last Surgery: Not applicable for this Episode    Visit # / Visits Authorized:  9 / 20  Insurance Authorization Period: 5/26/2025 to 12/31/2025  Date of Evaluation: 5/27/2025  Plan of Care Certification: 5/27/2025 to 8/19/2025      PT/PTA: PT   Number of PTA visits since last PT visit:0  Time In: 1516   Time Out: 1600  Total Time (in minutes): 44   Total Billable Time (in minutes): 44    FOTO:  Intake Score:  %  Survey Score 2:  %  Survey Score 3:  %    Precautions:     Standard and fall       Subjective   " I am doing good.".  Family / care giver present for this visit:   Pain reported as 5/10. squeezing feeling in B hips and quads.    Objective            Treatment:  Therapeutic Activity  TA 1: WC to mat transfer mod I  TA 2: mat to WC mod I  TA 3: pt doffed B AFOS and donned B KAFOs with SPV  TA 4: pt ambulated 240 ft x 3 trials with josefa walker with min/ mod A x 2 person assist with third person with wC follow. B KAFOS in locked position  TA 5: pt doffed B KAFOS and donned B AFOs with SPV  TA 6: sit to stands x 3 trials with josefa walker max a x 2 person and total A to lock out KAFOs.    Time Entry(in minutes):  Therapeutic Activity Time Entry: 44    Assessment & Plan   Assessment: Pt tolerated session well. Pt able to walk 240 ft x 2 trials today with josefa walker with 2 person assist with WC follow. Pt requires rest breaks in between. Pt remains motivated to improve and a good " candidate for PT.        The patient will continue to benefit from skilled outpatient physical therapy in order to address the deficits listed in the problem list on the initial evaluation, provide patient and family education, and maximize the patients level of independence in the home and community environments.     The patient's spiritual, cultural, and educational needs were considered, and the patient is agreeable to the plan of care and goals.     Education  Education was done with Patient. The patient's learning style includes Demonstration and Listening. The patient Demonstrates understanding and Verbalizes understanding.         Pt educated about core and arm strength with walking with josefa walker. Reports understanding.        Plan: walking with parallel bars.    Goals:   Active       long term goals       Patient will be independent and compliant with updated HEP. (Progressing)       Start:  05/27/25    Expected End:  08/19/25            Pt will improve dynamic sit balance from fair to good to improve safety and function.  (Progressing)       Start:  05/27/25    Expected End:  08/19/25            Patient will improve her FOTO score from 46 limited  to at least 50 limited for improved self perception of functional mobility.(score 0-100, high score indicates greater level of function) (Progressing)       Start:  05/27/25    Expected End:  08/19/25            Pt will be able to pull to stand using a grab bar from max a x 2 assist to max assist to improve independence and assist with functional improvements (Progressing)       Start:  05/27/25    Expected End:  07/08/25               short term goals       Patient will be provided with an HEP for strength, endurance and balance.  (Progressing)       Start:  05/27/25    Expected End:  07/08/25            Pt will improve static sit balance from fair- to fair to improve safety and function.  (Progressing)       Start:  05/27/25    Expected End:  07/08/25                 Roselia Ludwig, PT

## 2025-07-01 ENCOUNTER — DOCUMENTATION ONLY (OUTPATIENT)
Dept: REHABILITATION | Facility: HOSPITAL | Age: 65
End: 2025-07-01
Payer: MEDICARE

## 2025-07-01 NOTE — PROGRESS NOTES
PT/PTA met face to face to discuss pt's treatment plan and progress towards established goals. Pt will be seen by a physical therapist minimally every 6th visit or every 30 days.    Sakina Rojas PTA

## 2025-07-02 ENCOUNTER — CLINICAL SUPPORT (OUTPATIENT)
Dept: REHABILITATION | Facility: HOSPITAL | Age: 65
End: 2025-07-02
Payer: MEDICARE

## 2025-07-02 DIAGNOSIS — Z74.09 IMPAIRED FUNCTIONAL MOBILITY, BALANCE, GAIT, AND ENDURANCE: Primary | ICD-10-CM

## 2025-07-02 PROCEDURE — 97112 NEUROMUSCULAR REEDUCATION: CPT | Mod: PO

## 2025-07-02 NOTE — PROGRESS NOTES
Ultrasound tomorrow for kidneys.     3kg ball rotations.   Inand outs 3 kg ball   Overhead 3 kg     Pushups x 50 reps modified.       Quadruped cat cow and down to elbows and back up hands  Hip flexion in quadruped x 5 reps each way. March.     Supine chest press 10# weight dowel   14# last two sets.   Tricep extension 5# dowel     Standing parallelbar 2 min 2 min 2 min

## 2025-07-07 ENCOUNTER — CLINICAL SUPPORT (OUTPATIENT)
Dept: REHABILITATION | Facility: HOSPITAL | Age: 65
End: 2025-07-07
Payer: MEDICARE

## 2025-07-07 DIAGNOSIS — Z74.09 IMPAIRED FUNCTIONAL MOBILITY, BALANCE, GAIT, AND ENDURANCE: Primary | ICD-10-CM

## 2025-07-07 PROCEDURE — 97530 THERAPEUTIC ACTIVITIES: CPT | Mod: PO,CQ

## 2025-07-07 NOTE — PROGRESS NOTES
Outpatient Rehab    Physical Therapy Visit    Patient Name: Taisha Nance  MRN: [id number]  YOB: 1960  Encounter Date: 7/7/2025    Therapy Diagnosis:   Encounter Diagnosis   Name Primary?    Impaired functional mobility, balance, gait, and endurance Yes     Physician: Justin Harden MD    Physician Orders: Eval and Treat  Medical Diagnosis: Impaired functional mobility, balance, gait, and endurance  Surgical Diagnosis: Not applicable for this Episode   Surgical Date: Not applicable for this Episode  Days Since Last Surgery: Not applicable for this Episode    Visit # / Visits Authorized:  11 / 20  Insurance Authorization Period: 5/26/2025 to 12/31/2025  Date of Evaluation: 5/27/2025  Plan of Care Certification: 5/27/2025 to 8/19/2025      PT/PTA: PTA   Number of PTA visits since last PT visit:1  Time In: 1430   Time Out: 1515  Total Time (in minutes): 45   Total Billable Time (in minutes): 45    FOTO:  Intake Score:  %  Survey Score 2:  %  Survey Score 3:  %    Precautions:     Standard and fall      Subjective   Without complaints.  Family / care giver present for this visit:   Pain reported as 0/10.      Objective            Treatment:  Therapeutic Activity  TA 1: wc<>mat supervision  TA 2: Doffed/Donned shoe and AFO R L  Mod I  TA 3: Donned/Doffed KAFO R L Mod I  TA 4: Sit<>Stand mat<>MO walker ModA and assist for securing/releasing KAFO  TA 5: Ambulation with MO Walker with assist for walker management 360 feet and 480 feet, sit rest in between and standing rest during each ambulation.    Time Entry(in minutes):  Therapeutic Activity Time Entry: 45    Assessment & Plan   Assessment: Taisha provided good participation and effort during today's session with treatment focused on functional mobility to improve trunk and upper extremity strengthening. Taisha tolerated activities with fatigue, took a stand rest breaks and was able to continue.  Evaluation/Treatment Tolerance: Patient tolerated  treatment well    The patient will continue to benefit from skilled outpatient physical therapy in order to address the deficits listed in the problem list on the initial evaluation, provide patient and family education, and maximize the patients level of independence in the home and community environments.     The patient's spiritual, cultural, and educational needs were considered, and the patient is agreeable to the plan of care and goals.           Plan: Continue with POC to increase activities as patient tolerates.    Goals:   Active       long term goals       Patient will be independent and compliant with updated HEP. (Progressing)       Start:  05/27/25    Expected End:  08/19/25            Pt will improve dynamic sit balance from fair to good to improve safety and function.  (Progressing)       Start:  05/27/25    Expected End:  08/19/25            Patient will improve her FOTO score from 46 limited  to at least 50 limited for improved self perception of functional mobility.(score 0-100, high score indicates greater level of function) (Progressing)       Start:  05/27/25    Expected End:  08/19/25            Pt will be able to pull to stand using a grab bar from max a x 2 assist to max assist to improve independence and assist with functional improvements (Progressing)       Start:  05/27/25    Expected End:  07/08/25               short term goals       Patient will be provided with an HEP for strength, endurance and balance.  (Progressing)       Start:  05/27/25    Expected End:  07/08/25            Pt will improve static sit balance from fair- to fair to improve safety and function.  (Progressing)       Start:  05/27/25    Expected End:  07/08/25                Sakina Rojas, PTA

## 2025-07-07 NOTE — PROGRESS NOTES
"  Outpatient Rehab    Physical Therapy Visit    Patient Name: Taisha Nance  MRN: [id number]  YOB: 1960  Encounter Date: 7/2/2025    Therapy Diagnosis:   Encounter Diagnosis   Name Primary?    Impaired functional mobility, balance, gait, and endurance Yes     Physician: Justin Harden MD    Physician Orders: Eval and Treat  Medical Diagnosis: Impaired functional mobility, balance, gait, and endurance  Surgical Diagnosis: Not applicable for this Episode   Surgical Date: Not applicable for this Episode  Days Since Last Surgery: Not applicable for this Episode    Visit # / Visits Authorized:  10 / 20  Insurance Authorization Period: 5/26/2025 to 12/31/2025  Date of Evaluation: 5/27/2025  Plan of Care Certification: 5/27/2025 to 8/19/2025      PT/PTA: PT   Number of PTA visits since last PT visit:0  Time In: 1730   Time Out: 1815  Total Time (in minutes): 45   Total Billable Time (in minutes): 45    FOTO:  Intake Score:  %  Survey Score 2:  %  Survey Score 3:  %    Precautions:     Standard and fall       Subjective   " I am good." pt reports she has an ultrasound tomorrow..  Family / care giver present for this visit:   Pain reported as 5/10. squeezing feeling in B hips and quads.    Objective            Treatment:  Balance/Neuromuscular Re-Education  NMR 1: supine chest press with 14# dowel 3x10  NMR 2: prone modified partial pushups from knees 50 reps.  NMR 3: sitting edge of mat: 3 kg weighted ball bicep curls 3x10  NMR 4: sitting edge of mat: 3 kg weighted ball chest press  motion 3x10  NMR 5: sitting edge of mat: 3 kg weighted ball overhead lifts 3x10  NMR 6: sitting edge of mat: 3 kg weighted ball abdominal russian twists 3x10  NMR 7: sit to stands in parallel bars with therapist blocking knees mod A to assist to stand total A blocking knees. pt stood 2 min, 2min and , 2 min with seated rest breaks in between. min A/ mod A  once standing.  NMR 8: quadruped position therapist blocking B LE: " alternating arm lifts x5 reps each way  NMR 9: quadruped position therapist blocking B LE:cat/ cow x 30 reps  NMR 10: supine tricep extension with 5# dowel 3x10    Time Entry(in minutes):  Neuromuscular Re-Education Time Entry: 45    Assessment & Plan   Assessment: Pt tolerated session well. Pt with a little more difficulty getting into stand position today with therapist blocking knees and able to do 2 min x 3 trials today instead of 3 but this was at end of session after arm exercises. Working on supine and prone/ quadruped strength. Remains motivated to improve and a good candidate for PT.        The patient will continue to benefit from skilled outpatient physical therapy in order to address the deficits listed in the problem list on the initial evaluation, provide patient and family education, and maximize the patients level of independence in the home and community environments.     The patient's spiritual, cultural, and educational needs were considered, and the patient is agreeable to the plan of care and goals.           Plan: walking with parallel bars.    Goals:   Active       long term goals       Patient will be independent and compliant with updated HEP. (Progressing)       Start:  05/27/25    Expected End:  08/19/25            Pt will improve dynamic sit balance from fair to good to improve safety and function.  (Progressing)       Start:  05/27/25    Expected End:  08/19/25            Patient will improve her FOTO score from 46 limited  to at least 50 limited for improved self perception of functional mobility.(score 0-100, high score indicates greater level of function) (Progressing)       Start:  05/27/25    Expected End:  08/19/25            Pt will be able to pull to stand using a grab bar from max a x 2 assist to max assist to improve independence and assist with functional improvements (Progressing)       Start:  05/27/25    Expected End:  07/08/25               short term goals       Patient  will be provided with an HEP for strength, endurance and balance.  (Progressing)       Start:  05/27/25    Expected End:  07/08/25            Pt will improve static sit balance from fair- to fair to improve safety and function.  (Progressing)       Start:  05/27/25    Expected End:  07/08/25                Roselia Ludwig PT

## 2025-07-10 ENCOUNTER — CLINICAL SUPPORT (OUTPATIENT)
Dept: REHABILITATION | Facility: HOSPITAL | Age: 65
End: 2025-07-10
Payer: MEDICARE

## 2025-07-10 DIAGNOSIS — Z74.09 IMPAIRED FUNCTIONAL MOBILITY, BALANCE, GAIT, AND ENDURANCE: Primary | ICD-10-CM

## 2025-07-10 PROCEDURE — 97112 NEUROMUSCULAR REEDUCATION: CPT | Mod: PO

## 2025-07-10 NOTE — PROGRESS NOTES
Outpatient Rehab    Physical Therapy Visit    Patient Name: Taisha Nance  MRN: [id number]  YOB: 1960  Encounter Date: 7/10/2025    Therapy Diagnosis:   Encounter Diagnosis   Name Primary?    Impaired functional mobility, balance, gait, and endurance Yes     Physician: Justin Harden MD    Physician Orders: Eval and Treat  Medical Diagnosis: Impaired functional mobility, balance, gait, and endurance  Surgical Diagnosis: Not applicable for this Episode   Surgical Date: Not applicable for this Episode  Days Since Last Surgery: Not applicable for this Episode    Visit # / Visits Authorized:  12 / 20  Insurance Authorization Period: 5/26/2025 to 12/31/2025  Date of Evaluation: 5/27/2025  Plan of Care Certification: 5/27/2025 to 8/19/2025      PT/PTA: PT   Number of PTA visits since last PT visit:0  Time In: 1518   Time Out: 1600  Total Time (in minutes): 42   Total Billable Time (in minutes):      FOTO:  Intake Score:  %  Survey Score 2:  %  Survey Score 3:  %    Precautions:     Standard and fall      Subjective   Arrives with parents..  Family / care giver present for this visit:   Pain reported as 0/10. discomfort/tingling across abdomen and down thighs    Objective            Treatment:  Balance/Neuromuscular Re-Education  NMR 1: standing in parallel bars with bilateral afos donned and bilateral NMEs to quad 10on/10foff 100mamps with mod assist from therapist at hips and knee block with airex pad in front of therapist knees and yoga block and belt around patients knees to prevent knee valgus for 2mins, 3mins with heavy upper extremity support to improve standing tolerance, weightbearing,  nmes to quads  NMR 2: seated on balance disc patient performs bicep curls with 5 pound dumbbell 2sets of 10reps each side with opposite arm stabalizing on knee to improve dynamic sitting balance on unstable surface and upper extremity strength  NMR 3: seated on balance disc patient performs modified russisan  twists 2sets of 10reps alternating sides to improve core strength and sitting balance  NMR 4: seated on mat patient performs russian twists with 5 pound dumbell [id number] to improve core strength and trunk control  Therapeutic Activity  TA 1: lateral transfer mat <>wheelchair with supervison    Time Entry(in minutes):  Neuromuscular Re-Education Time Entry: 42    Assessment & Plan   Assessment: Taisha tolerated session well. Able to  parallel bars with mod assist and heavy upper extremity support for 2mins and 3mins. She will continue to benefit from physical therapy to improve core strength, upper extremity strength, ambulation with KAFO, and standing endurance.   Evaluation/Treatment Tolerance: Patient tolerated treatment well    The patient will continue to benefit from skilled outpatient physical therapy in order to address the deficits listed in the problem list on the initial evaluation, provide patient and family education, and maximize the patients level of independence in the home and community environments.     The patient's spiritual, cultural, and educational needs were considered, and the patient is agreeable to the plan of care and goals.     Education  Education was done with Patient. The patient's learning style includes Listening. The patient Verbalizes understanding.         Educated on NMES and goal in standing       Plan: Continue with POC to increase activities as patient tolerates.    Goals:   Active       long term goals       Patient will be independent and compliant with updated HEP. (Progressing)       Start:  05/27/25    Expected End:  08/19/25            Pt will improve dynamic sit balance from fair to good to improve safety and function.  (Progressing)       Start:  05/27/25    Expected End:  08/19/25            Patient will improve her FOTO score from 46 limited  to at least 50 limited for improved self perception of functional mobility.(score 0-100, high score indicates greater  level of function) (Progressing)       Start:  05/27/25    Expected End:  08/19/25            Pt will be able to pull to stand using a grab bar from max a x 2 assist to max assist to improve independence and assist with functional improvements (Progressing)       Start:  05/27/25    Expected End:  08/19/25               short term goals       Patient will be provided with an HEP for strength, endurance and balance.  (Progressing)       Start:  05/27/25    Expected End:  07/24/25            Pt will improve static sit balance from fair- to fair to improve safety and function.  (Progressing)       Start:  05/27/25    Expected End:  07/24/25                Nadine Mallory, PT

## 2025-07-15 ENCOUNTER — CLINICAL SUPPORT (OUTPATIENT)
Dept: REHABILITATION | Facility: HOSPITAL | Age: 65
End: 2025-07-15
Payer: MEDICARE

## 2025-07-15 DIAGNOSIS — Z74.09 IMPAIRED FUNCTIONAL MOBILITY, BALANCE, GAIT, AND ENDURANCE: Primary | ICD-10-CM

## 2025-07-15 PROCEDURE — 97530 THERAPEUTIC ACTIVITIES: CPT | Mod: PO

## 2025-07-15 NOTE — PROGRESS NOTES
Outpatient Rehab    Physical Therapy Visit    Patient Name: Taisha Nance  MRN: [id number]  YOB: 1960  Encounter Date: 7/15/2025    Therapy Diagnosis:   Encounter Diagnosis   Name Primary?    Impaired functional mobility, balance, gait, and endurance Yes     Physician: Justin Harden MD    Physician Orders: Eval and Treat  Medical Diagnosis: Impaired functional mobility, balance, gait, and endurance  Surgical Diagnosis: Not applicable for this Episode   Surgical Date: Not applicable for this Episode  Days Since Last Surgery: Not applicable for this Episode    Visit # / Visits Authorized:  13 / 20  Insurance Authorization Period: 5/26/2025 to 12/31/2025  Date of Evaluation: 5/27/2025  Plan of Care Certification: 5/27/2025 to 8/19/2025      PT/PTA: PT   Number of PTA visits since last PT visit:0  Time In: 1515   Time Out: 1604  Total Time (in minutes): 49   Total Billable Time (in minutes):      FOTO:  Intake Score: 46%  Survey Score 2: 42%  Survey Score 3: Not applicable for this Episode%    Precautions:  Additional Precautions and Protocol Details: Standard and fall      Subjective   I'm good. I had a new counselor today..  Family / care giver present for this visit:   Pain reported as 0/10. discomfort/tingling across abdomen and down thighs, does not rate    Objective            Treatment:  Therapeutic Activity  TA 1: lateral transfer to mat with supervision  TA 2: lateral transfer mat to wheelchair supervision  TA 3: donned and doffed AFO independently  TA 4: donned and doffed KAFO with some assist for time sake independent for balance in longsitting  TA 5: sit <>long sit without KAFO independent  TA 6: sit to longsit with KAFO with assist for lifting KAFO onto mat  TA 7: sit<>stand from mat to and from josefa walker with assist of 2, then assist to lock out right KAFO, left locks out on own  TA 8: ambulation with josefa walker and bilateral KAFO 275 feet x 2 trials with assist of 2 (requires  assist to steer josefa walker without assist pushes josefa walker forward then it comes back ) heavy reliance on UE    Time Entry(in minutes):  Therapeutic Activity Time Entry: 49    Assessment & Plan   Assessment: Taisha tolerates session well. Today focused standing and ambulating with josefa walker with KAFOs. She did have trouble with steering josefa walker (would push forward and then it would come back before stepping). She did better with Physical Therapist helping to steer jsoefa walker and keep it even while she stepped. Occasional scissoring noted and decreased heel contact at first but improved as she lowered josefa walker. She will continue to benefit from physical therapy to improve standing, walking, core and upper extremity strength.   Evaluation/Treatment Tolerance: Patient tolerated treatment well    The patient will continue to benefit from skilled outpatient physical therapy in order to address the deficits listed in the problem list on the initial evaluation, provide patient and family education, and maximize the patients level of independence in the home and community environments.     The patient's spiritual, cultural, and educational needs were considered, and the patient is agreeable to the plan of care and goals.     Education  Education was done with Patient. The patient's learning style includes Listening. The patient Verbalizes understanding.         Educated on pushing josefa walker forward and stepping and increasing step width.        Plan: Continue with POC to increase activities as patient tolerates.    Goals:   Active       long term goals       Patient will be independent and compliant with updated HEP. (Progressing)       Start:  05/27/25    Expected End:  08/19/25            Pt will improve dynamic sit balance from fair to good to improve safety and function.  (Progressing)       Start:  05/27/25    Expected End:  08/19/25            Patient will improve her FOTO score from 46 limited  to at least 50  limited for improved self perception of functional mobility.(score 0-100, high score indicates greater level of function) (Progressing)       Start:  05/27/25    Expected End:  08/19/25            Pt will be able to pull to stand using a grab bar from max a x 2 assist to max assist to improve independence and assist with functional improvements (Progressing)       Start:  05/27/25    Expected End:  08/19/25               short term goals       Patient will be provided with an HEP for strength, endurance and balance.  (Progressing)       Start:  05/27/25    Expected End:  07/24/25            Pt will improve static sit balance from fair- to fair to improve safety and function.  (Progressing)       Start:  05/27/25    Expected End:  07/24/25                Nadine Mallory, PT

## 2025-07-21 ENCOUNTER — CLINICAL SUPPORT (OUTPATIENT)
Dept: REHABILITATION | Facility: HOSPITAL | Age: 65
End: 2025-07-21
Payer: MEDICARE

## 2025-07-21 DIAGNOSIS — Z74.09 IMPAIRED FUNCTIONAL MOBILITY, BALANCE, GAIT, AND ENDURANCE: Primary | ICD-10-CM

## 2025-07-21 PROCEDURE — 97530 THERAPEUTIC ACTIVITIES: CPT | Mod: PO,CQ

## 2025-07-21 NOTE — PROGRESS NOTES
Outpatient Rehab    Physical Therapy Visit    Patient Name: Taisha Nance  MRN: [id number]  YOB: 1960  Encounter Date: 7/21/2025    Therapy Diagnosis:   Encounter Diagnosis   Name Primary?    Impaired functional mobility, balance, gait, and endurance Yes     Physician: Justin Harden MD    Physician Orders: Eval and Treat  Medical Diagnosis: Impaired functional mobility, balance, gait, and endurance  Surgical Diagnosis: Not applicable for this Episode   Surgical Date: Not applicable for this Episode  Days Since Last Surgery: Not applicable for this Episode    Visit # / Visits Authorized:  14 / 20  Insurance Authorization Period: 5/26/2025 to 12/31/2025  Date of Evaluation: 5/27/2025  Plan of Care Certification: 5/27/2025 to 8/19/2025      PT/PTA: PTA   Number of PTA visits since last PT visit:1  Time In: 1515   Time Out: 1600  Total Time (in minutes): 45   Total Billable Time (in minutes): 45    FOTO:  Intake Score (%): 46  Survey Score 2 (%): 42  Survey Score 3 (%): Not applicable for this Episode    Precautions:  Additional Precautions and Protocol Details: Additional Precautions and Protocol Details: Standard and fall      Subjective   without complaints.  Family / care giver present for this visit:  (Both parents present in treatment area)  Pain reported as 0/10.      Objective            Treatment:  Therapeutic Activity  TA 1: Donning/doffing B KAFO with assistance of family  TA 2: Sit<>Stand from mat to MO walker maxA  TA 3: Ambulation with MO walker Omega<>modA  250 feet and 2x 370 feet, seated rest break between each    Time Entry(in minutes):  Therapeutic Activity Time Entry: 45    Assessment & Plan   Assessment: Taisha provided good participation and effort during today's session with treatment focused on functional mobility to improve trunk strength with ambulation. Taisha tolerated activities with seated rest breaks and Wheelchair to follow.  Evaluation/Treatment Tolerance: Patient  tolerated treatment well    The patient will continue to benefit from skilled outpatient physical therapy in order to address the deficits listed in the problem list on the initial evaluation, provide patient and family education, and maximize the patients level of independence in the home and community environments.     The patient's spiritual, cultural, and educational needs were considered, and the patient is agreeable to the plan of care and goals.           Plan: Continue with POC to increase activities as patient tolerates.    Goals:   Active       long term goals       Patient will be independent and compliant with updated HEP. (Progressing)       Start:  05/27/25    Expected End:  08/19/25            Pt will improve dynamic sit balance from fair to good to improve safety and function.  (Progressing)       Start:  05/27/25    Expected End:  08/19/25            Patient will improve her FOTO score from 46 limited  to at least 50 limited for improved self perception of functional mobility.(score 0-100, high score indicates greater level of function) (Progressing)       Start:  05/27/25    Expected End:  08/19/25            Pt will be able to pull to stand using a grab bar from max a x 2 assist to max assist to improve independence and assist with functional improvements (Progressing)       Start:  05/27/25    Expected End:  08/19/25               short term goals       Patient will be provided with an HEP for strength, endurance and balance.  (Progressing)       Start:  05/27/25    Expected End:  07/24/25            Pt will improve static sit balance from fair- to fair to improve safety and function.  (Progressing)       Start:  05/27/25    Expected End:  07/24/25                Sakina Rojas, PTA

## 2025-07-29 ENCOUNTER — CLINICAL SUPPORT (OUTPATIENT)
Dept: REHABILITATION | Facility: HOSPITAL | Age: 65
End: 2025-07-29
Payer: MEDICARE

## 2025-07-29 DIAGNOSIS — Z74.09 IMPAIRED FUNCTIONAL MOBILITY, BALANCE, GAIT, AND ENDURANCE: Primary | ICD-10-CM

## 2025-07-29 PROCEDURE — 97530 THERAPEUTIC ACTIVITIES: CPT | Mod: PO

## 2025-07-29 NOTE — PROGRESS NOTES
Outpatient Rehab    Physical Therapy Visit    Patient Name: Taisha Nance  MRN: [id number]  YOB: 1960  Encounter Date: 7/29/2025    Therapy Diagnosis:   Encounter Diagnosis   Name Primary?    Impaired functional mobility, balance, gait, and endurance Yes     Physician: Justin Harden MD    Physician Orders: Eval and Treat  Medical Diagnosis: Impaired functional mobility, balance, gait, and endurance  Surgical Diagnosis: Not applicable for this Episode   Surgical Date: Not applicable for this Episode  Days Since Last Surgery: Not applicable for this Episode    Visit # / Visits Authorized:  15 / 20  Insurance Authorization Period: 5/26/2025 to 12/31/2025  Date of Evaluation: 5/27/2025  Plan of Care Certification: 5/27/2025 to 8/19/2025      PT/PTA: PT   Number of PTA visits since last PT visit:0  Time In: 1516   Time Out: 1558  Total Time (in minutes): 42   Total Billable Time (in minutes):      FOTO:  Intake Score (%): 46  Survey Score 2 (%): 42  Survey Score 3 (%): Not applicable for this Episode    Precautions:  Additional Precautions and Protocol Details: standard and fall      Subjective   I'm good..  Family / care giver present for this visit:  (mom and dad)    discomfort/tingling across abdomen and down thighs, does not rate    Objective            Treatment:  Therapeutic Activity  TA 1: ambulation 375 feet, 260ft (with bilateral KAFO with josefa walker with assist of 2 for safety (contact-min assist for balance and mod assist for steering) 2nd trial 2nd person following with w/c not needed to help hold and steer josefa walker  TA 2: sit <>stand from mat to josefa with assist of 2 for safety (assist to lock out KAFO) x 2 trials  TA 3: removed AFO and shoes independently , donned KAFO and shoes with min assist for time sake  TA 4: doffed KAFO while sitting edge of mat with PT locking out KAFO while patient removes KAFO    Time Entry(in minutes):  Therapeutic Activity Time Entry: 42    Assessment &  Plan   Assessment: Taisha tolerated session well. She was able to ambulate 3 labs with josefa walkers and KAFOs bilaterally without restbreak. On 2nd ambulation trial she was able to perform with only Physical Therapist behind with contact-min and helping to steer with mod assist. She did fatigue on second lap but was able to complete. She will continue to benefit from therapy to improve walking endurance with KAFO with josefa walker, core strength, and standing.   Evaluation/Treatment Tolerance: Patient tolerated treatment well    The patient will continue to benefit from skilled outpatient physical therapy in order to address the deficits listed in the problem list on the initial evaluation, provide patient and family education, and maximize the patients level of independence in the home and community environments.     The patient's spiritual, cultural, and educational needs were considered, and the patient is agreeable to the plan of care and goals.     Education  Education was done with Patient. The patient's learning style includes Listening. The patient Verbalizes understanding.         Educated on sequencing with ambulation and josefa walker        Plan: Continue with POC to increase activities as patient tolerates.    Goals:   Active       long term goals       Patient will be independent and compliant with updated HEP. (Progressing)       Start:  05/27/25    Expected End:  08/19/25            Pt will improve dynamic sit balance from fair to good to improve safety and function.  (Progressing)       Start:  05/27/25    Expected End:  08/19/25            Patient will improve her FOTO score from 46 limited  to at least 50 limited for improved self perception of functional mobility.(score 0-100, high score indicates greater level of function) (Progressing)       Start:  05/27/25    Expected End:  08/19/25            Pt will be able to pull to stand using a grab bar from max a x 2 assist to max assist to improve  independence and assist with functional improvements (Progressing)       Start:  05/27/25    Expected End:  08/19/25               short term goals       Patient will be provided with an HEP for strength, endurance and balance.  (Progressing)       Start:  05/27/25    Expected End:  08/26/25            Pt will improve static sit balance from fair- to fair to improve safety and function.  (Progressing)       Start:  05/27/25    Expected End:  08/26/25                Nadine Mallory, PT

## 2025-07-31 ENCOUNTER — CLINICAL SUPPORT (OUTPATIENT)
Dept: REHABILITATION | Facility: HOSPITAL | Age: 65
End: 2025-07-31
Payer: MEDICARE

## 2025-07-31 DIAGNOSIS — Z74.09 IMPAIRED FUNCTIONAL MOBILITY, BALANCE, GAIT, AND ENDURANCE: Primary | ICD-10-CM

## 2025-07-31 PROCEDURE — 97112 NEUROMUSCULAR REEDUCATION: CPT | Mod: PO

## 2025-07-31 PROCEDURE — 97530 THERAPEUTIC ACTIVITIES: CPT | Mod: PO

## 2025-07-31 NOTE — PROGRESS NOTES
Outpatient Rehab    Physical Therapy Progress Note    Patient Name: Taisha Nance  MRN: [id number]  YOB: 1960  Encounter Date: 7/31/2025    Therapy Diagnosis:   Encounter Diagnosis   Name Primary?    Impaired functional mobility, balance, gait, and endurance Yes     Physician: Justin Harden MD    Physician Orders: Eval and Treat  Medical Diagnosis: Impaired functional mobility, balance, gait, and endurance  Surgical Diagnosis: Not applicable for this Episode   Surgical Date: Not applicable for this Episode  Days Since Last Surgery: Not applicable for this Episode    Visit # / Visits Authorized:  16 / 20  Insurance Authorization Period: 5/26/2025 to 12/31/2025  Date of Evaluation: 5/27/2025  Plan of Care Certification: 5/27/2025 to 8/19/2025      PT/PTA: PT   Number of PTA visits since last PT visit:0  Time In: 1103   Time Out: 1152  Total Time (in minutes): 49   Total Billable Time (in minutes):      FOTO:  Intake Score (%): 46  Survey Score 2 (%): 42  Survey Score 3 (%): 34    Precautions:  Additional Precautions and Protocol Details: standard and fall    Subjective   I'm good..  Family / care giver present for this visit:  (parents)    discomfort/tingling across abdomen and down thighs, does not rate    Objective            Treatment:  Balance/Neuromuscular Re-Education  NMR 1: standing in parallel bars with bilateral knee block and NMES on bilateral quads (continous, 70mamps) while patient performs static stand for 3 trials (3mins, 3mins, 2mins) with min-mod assist and cues for tucking pelvis, forward weightshif and midline stance (shifts to right)  Therapeutic Activity  TA 1: ambulation 375 feet, with bilateral KAFO with josefa walker with assist of 2 for safety (contact-min assist for balance and mod assist for steering of josefa walker) 2nd person for wheelchair follow, fatigues on 3rd lap  TA 2: sit to stand from mat to josefa walker with mod assist of 1 then PT had to lock out both KAFO  TA 3:  sit to  parallel bars with mod assist x 3 trials with PT blocking bilateral knees and bilateral AFOs donned  TA 5: transfer wheelchair to and from mat independently    Time Entry(in minutes):  Neuromuscular Re-Education Time Entry: 15  Therapeutic Activity Time Entry: 34    Assessment & Plan   Assessment: Taisha tolerated session well. The last two session she has been able to ambulate with bilateral KAFOs 375 feet with assist of 1 for steering/balance and wheelchair follow for safety. She can  the parallel bars with mod assist for 3 minutes. She is improving with her ambulation endurance, transfers in bars, and static standing tolerance. She will continue to benefit from therapy to improve ambulation endurance, sit to stands, standing tolerance, and core strength.   Evaluation/Treatment Tolerance: Patient tolerated treatment well    The patient will continue to benefit from skilled outpatient physical therapy in order to address the deficits listed in the problem list on the initial evaluation, provide patient and family education, and maximize the patients level of independence in the home and community environments.     The patient's spiritual, cultural, and educational needs were considered, and the patient is agreeable to the plan of care and goals.     Education  Education was done with Patient. The patient's learning style includes Listening. The patient Verbalizes understanding.         Educated on standing position in midline while standing in parallel bars        Plan: Continue with POC to increase activities as patient tolerates.    Goals:   Active       long term goals       Patient will be independent and compliant with updated HEP. (Progressing)       Start:  05/27/25    Expected End:  08/19/25            Pt will improve dynamic sit balance from fair to good to improve safety and function.  (Progressing)       Start:  05/27/25    Expected End:  08/19/25            Patient will improve  her FOTO score from 46 limited  to at least 50 limited for improved self perception of functional mobility.(score 0-100, high score indicates greater level of function) (Progressing)       Start:  05/27/25    Expected End:  08/19/25            Pt will be able to pull to stand using a grab bar from max a x 2 assist to max assist to improve independence and assist with functional improvements (Progressing)       Start:  05/27/25    Expected End:  08/19/25               short term goals       Patient will be provided with an HEP for strength, endurance and balance.  (Progressing)       Start:  05/27/25    Expected End:  08/26/25            Pt will improve static sit balance from fair- to fair to improve safety and function.  (Progressing)       Start:  05/27/25    Expected End:  08/26/25                Nadine Mallory, PT

## 2025-08-04 ENCOUNTER — CLINICAL SUPPORT (OUTPATIENT)
Dept: REHABILITATION | Facility: HOSPITAL | Age: 65
End: 2025-08-04
Payer: MEDICARE

## 2025-08-04 DIAGNOSIS — Z74.09 IMPAIRED FUNCTIONAL MOBILITY, BALANCE, GAIT, AND ENDURANCE: Primary | ICD-10-CM

## 2025-08-04 PROCEDURE — 97530 THERAPEUTIC ACTIVITIES: CPT | Mod: PO

## 2025-08-04 NOTE — PROGRESS NOTES
Outpatient Rehab    Physical Therapy Visit    Patient Name: Taisha Nance  MRN: [id number]  YOB: 1960  Encounter Date: 8/4/2025    Therapy Diagnosis:   Encounter Diagnosis   Name Primary?    Impaired functional mobility, balance, gait, and endurance Yes     Physician: Justin Harden MD    Physician Orders: Eval and Treat  Medical Diagnosis: Impaired functional mobility, balance, gait, and endurance  Surgical Diagnosis: Not applicable for this Episode   Surgical Date: Not applicable for this Episode  Days Since Last Surgery: Not applicable for this Episode    Visit # / Visits Authorized:  17 / 20  Insurance Authorization Period: 5/26/2025 to 12/31/2025  Date of Evaluation: 5/27/2025  Plan of Care Certification: 5/27/2025 to 8/19/2025      PT/PTA: PT   Number of PTA visits since last PT visit:0  Time In: 1345   Time Out: 1431  Total Time (in minutes): 46   Total Billable Time (in minutes): 46    FOTO:  Intake Score (%): 46  Survey Score 2 (%): 42  Survey Score 3 (%): 34    Precautions:  Additional Precautions and Protocol Details: standard and fall      Subjective   No new complaints..  Family / care giver present for this visit:  (mom and dad present during session.)  Pain reported as 0/10.      Objective            Treatment:  Therapeutic Activity  TA 1: ambulation 2 x 375 feet and x 240 feet, with bilateral KAFO with josefa walker with assist of 2 for safety (contact-min assist for balance and mod assist for steering of josefa walker) 2nd person for wheelchair follow, fatigues on 3rd lap  TA 2: sit to stand from mat to josefa walker with min assist of 1 then PT had to lock out both KAFO    Time Entry(in minutes):  Therapeutic Activity Time Entry: 46    Assessment & Plan   Assessment: Taisha tolerated treatment session well. Ambulated 375 feet(3 laps) x 2 with seated rest break between and 240 feet(2 laps) donning bilateral KAFOs with assist of 1 for steering and wheelchair follow for safety. Minimal  assistance for donning/doffing KAFO for decreased time to complete but patient able to don/doff independently. She remains appropriate for PT at this time. Future sessions will include ESTIM to glutes and quad per pt tolerance.  Evaluation/Treatment Tolerance: Patient tolerated treatment well    The patient will continue to benefit from skilled outpatient physical therapy in order to address the deficits listed in the problem list on the initial evaluation, provide patient and family education, and maximize the patients level of independence in the home and community environments.     The patient's spiritual, cultural, and educational needs were considered, and the patient is agreeable to the plan of care and goals.     Education  Education was done with Patient. The patient's learning style includes Listening. The patient Verbalizes understanding.         Improvement in gait endurance.       Plan: Continue with POC to increase activities as patient tolerates.    Goals:   Active       long term goals       Patient will be independent and compliant with updated HEP. (Progressing)       Start:  05/27/25    Expected End:  08/19/25            Pt will improve dynamic sit balance from fair to good to improve safety and function.  (Progressing)       Start:  05/27/25    Expected End:  08/19/25            Patient will improve her FOTO score from 46 limited  to at least 50 limited for improved self perception of functional mobility.(score 0-100, high score indicates greater level of function) (Progressing)       Start:  05/27/25    Expected End:  08/19/25            Pt will be able to pull to stand using a grab bar from max a x 2 assist to max assist to improve independence and assist with functional improvements (Progressing)       Start:  05/27/25    Expected End:  08/19/25               short term goals       Patient will be provided with an HEP for strength, endurance and balance.  (Progressing)       Start:  05/27/25     Expected End:  08/26/25            Pt will improve static sit balance from fair- to fair to improve safety and function.  (Progressing)       Start:  05/27/25    Expected End:  08/26/25                Kasie Castro PT

## 2025-08-14 ENCOUNTER — CLINICAL SUPPORT (OUTPATIENT)
Dept: REHABILITATION | Facility: HOSPITAL | Age: 65
End: 2025-08-14
Payer: MEDICARE

## 2025-08-14 DIAGNOSIS — Z74.09 IMPAIRED FUNCTIONAL MOBILITY, BALANCE, GAIT, AND ENDURANCE: Primary | ICD-10-CM

## 2025-08-14 PROCEDURE — 97530 THERAPEUTIC ACTIVITIES: CPT | Mod: PO,CQ

## 2025-08-18 ENCOUNTER — CLINICAL SUPPORT (OUTPATIENT)
Dept: REHABILITATION | Facility: HOSPITAL | Age: 65
End: 2025-08-18
Payer: MEDICARE

## 2025-08-18 DIAGNOSIS — Z74.09 IMPAIRED FUNCTIONAL MOBILITY, BALANCE, GAIT, AND ENDURANCE: Primary | ICD-10-CM

## 2025-08-18 PROCEDURE — 97530 THERAPEUTIC ACTIVITIES: CPT | Mod: PO
